# Patient Record
Sex: MALE | Race: WHITE | NOT HISPANIC OR LATINO | Employment: FULL TIME | URBAN - METROPOLITAN AREA
[De-identification: names, ages, dates, MRNs, and addresses within clinical notes are randomized per-mention and may not be internally consistent; named-entity substitution may affect disease eponyms.]

---

## 2017-04-13 ENCOUNTER — TELEPHONE (OUTPATIENT)
Dept: FAMILY MEDICINE | Facility: CLINIC | Age: 52
End: 2017-04-13

## 2017-04-13 NOTE — TELEPHONE ENCOUNTER
"Trung Dubois is a 51 year old male who calls with chest pain.    NURSING ASSESSMENT:  Description:  Has had 2-3 episodes over the past couple days. Chest pain; \"feels like someone is squeezing my chest really hard\". Pain in the lower jaw. Sob present with other episodes, not with current situation. Past couple days \"i have had to concentrate extremely hard in order for my hand to write. i normally have smooth, flowing handwriting, and now i can't even get my hand to move the pen\" patient states he thinks it's his acid reflux because he has been off his medication for a while now.   Onset/duration:  Couple days  Precip. factors:  n/a  Associated symptoms:  See above  Improves/worsens symptoms:  n/a  Pain scale (0-10)   7/10  LMP/preg/breast feeding:  n/a  Last exam/Treatment:  12/2016  Allergies:   Allergies   Allergen Reactions     Prevacid [Lansoprazole] Nausea       MEDICATIONS:   Taking medication(s) as prescribed? N/A  Taking over the counter medication(s?) N/A  Any medication side effects? Not Applicable    Any barriers to taking medication(s) as prescribed?  N/A  Medication(s) improving/managing symptoms?  N/A  Medication reconciliation completed: N/A      NURSING PLAN: Nursing advice to patient ED visit    RECOMMENDED DISPOSITION:  To ED, another person to drive - patient notified needs ED evaluation. Patient states he does not want to go, just wants to see Sangeetha in clinic. Notified patient that Sangeetha has no openings and is done for the day early and is not in tomorrow; notified we cannot get results fast in clinic as the tests are sent out to another location-if shows up to clinic with these symptoms, may be sent out by ambulance based on findings. Patient states he will go to the ED.   Will comply with recommendation: Yes  If further questions/concerns or if symptoms do not improve, worsen or new symptoms develop, call your PCP or [facility] as soon as possible.      Guideline " used:  Telephone Triage Protocols for Nurses, Fourth Edition, Sonja Jiménez  Chest pain  Breathing problems  Neurological symptoms    NOTES:  Disposition was determined by the first positive assessment question, therefore all previous assessment questions were negative      Sangeetha Frank RN

## 2017-04-18 ENCOUNTER — TELEPHONE (OUTPATIENT)
Dept: FAMILY MEDICINE | Facility: CLINIC | Age: 52
End: 2017-04-18

## 2017-04-18 NOTE — TELEPHONE ENCOUNTER
Reason for Call:  Other Stress Test    Detailed comments: pt calling states needs to have a stress test done and that an order needs to be placed for this regarding his chest pains. Please advise and contact pt in regards.    Phone Number Patient can be reached at: Cell number on file:    Telephone Information:   Mobile [phone]       Best Time: ANY    Can we leave a detailed message on this number? YES    Call taken on 4/18/2017 at 2:16 PM by Susu Mancuso
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
## 2017-04-20 ENCOUNTER — OFFICE VISIT (OUTPATIENT)
Dept: FAMILY MEDICINE | Facility: CLINIC | Age: 52
End: 2017-04-20
Payer: COMMERCIAL

## 2017-04-20 VITALS
BODY MASS INDEX: 36.5 KG/M2 | DIASTOLIC BLOOD PRESSURE: 87 MMHG | HEART RATE: 76 BPM | WEIGHT: 275.4 LBS | OXYGEN SATURATION: 97 % | HEIGHT: 73 IN | SYSTOLIC BLOOD PRESSURE: 129 MMHG | RESPIRATION RATE: 16 BRPM | TEMPERATURE: 98 F

## 2017-04-20 DIAGNOSIS — R07.89 ATYPICAL CHEST PAIN: ICD-10-CM

## 2017-04-20 DIAGNOSIS — K21.9 GASTROESOPHAGEAL REFLUX DISEASE, ESOPHAGITIS PRESENCE NOT SPECIFIED: Primary | ICD-10-CM

## 2017-04-20 DIAGNOSIS — R06.09 DOE (DYSPNEA ON EXERTION): ICD-10-CM

## 2017-04-20 PROCEDURE — 99214 OFFICE O/P EST MOD 30 MIN: CPT | Performed by: NURSE PRACTITIONER

## 2017-04-20 RX ORDER — MAGNESIUM HYDROXIDE/ALUMINUM HYDROXICE/SIMETHICONE 120; 1200; 1200 MG/30ML; MG/30ML; MG/30ML
30 SUSPENSION ORAL EVERY 4 HOURS PRN
COMMUNITY
End: 2017-04-26 | Stop reason: ALTCHOICE

## 2017-04-20 RX ORDER — PANTOPRAZOLE SODIUM 20 MG/1
TABLET, DELAYED RELEASE ORAL
Qty: 60 TABLET | Refills: 0 | Status: SHIPPED | OUTPATIENT
Start: 2017-04-20 | End: 2017-06-13

## 2017-04-20 RX ORDER — CALCIUM CARBONATE 500 MG/1
1 TABLET, CHEWABLE ORAL DAILY
COMMUNITY
End: 2017-04-26

## 2017-04-20 ASSESSMENT — PAIN SCALES - GENERAL: PAINLEVEL: NO PAIN (0)

## 2017-04-20 NOTE — MR AVS SNAPSHOT
After Visit Summary   4/20/2017    Trung Dubois    MRN: [id number]           Patient Information     Date Of Birth          1965        Visit Information        Provider Department      4/20/2017 3:20 PM Sangeetha Salazar APRN [facility]        Today's Diagnoses     Gastroesophageal reflux disease, esophagitis presence not specified    -  1    Atypical chest pain        AHUJA (dyspnea on exertion)           Follow-ups after your visit        Your next 10 appointments already scheduled     Apr 21, 2017  2:30 PM CDT   Ech Stress Test with MGECHR1, MG CARD, MG STRESS RM, MG ECHO TECH, MG CV TECH   [facility]    [id number] [place] [id number] [phone]           1.  Please bring or wear a comfortable two-piece outfit and walking shoes. 2.  Stop eating 3 hours before the test. You may drink water or juice. 3.  Stop all caffeine 12 hours before the test. This includes coffee, tea, soda pop, chocolate and certain medicines (such as Anacin and Excederin). Also avoid decaf coffee and tea, as these contain small amounts of caffeine. 4.  No alcohol, smoking or use of other tobacco products for 12 hours before the test. 5.  Refer to your provider instructions to see if you need to stop any medications (such as beta-blockers or nitrates) for this test. 6.  For patients with diabetes: -   If you take insulin, call your diabetes care team. Ask if you should take a   dose the morning of your test. -   If you take diabetes medicine by mouth, don't take it on the morning of your test. Bring it with you to take after the test.  (If you have questions, call your diabetes care team) 7.  When you arrive, please tell us if: -   You have diabetes. -   You have taken Viagra, Cialis or Levitra in the past 48 hours. 8.  For any questions that cannot be answered, please contact the ordering physician              Future tests that were  "ordered for you today     Open Future Orders        Priority Expected Expires Ordered    Echo stress test with definity Routine  4/20/2018 4/20/2017            Who to contact     If you have questions or need follow up information about today's clinic visit or your schedule please contact [facility] directly at [phone].  Normal or non-critical lab and imaging results will be communicated to you by MyChart, letter or phone within 4 business days after the clinic has received the results. If you do not hear from us within 7 days, please contact the clinic through [employer] or phone. If you have a critical or abnormal lab result, we will notify you by phone as soon as possible.  Submit refill requests through [employer] or call your pharmacy and they will forward the refill request to us. Please allow 3 business days for your refill to be completed.          Additional Information About Your Visit        [employer] Information     [employer] gives you secure access to your electronic health record. If you see a primary care provider, you can also send messages to your care team and make appointments. If you have questions, please call your primary care clinic.  If you do not have a primary care provider, please call [phone] and they will assist you.        Care EveryWhere ID     This is your Care EveryWhere ID. This could be used by other organizations to access your [facility] medical records  [id number]        Your Vitals Were     Pulse Temperature Respirations Height Pulse Oximetry BMI (Body Mass Index)    76 98  F (36.7  C) (Oral) 16 6' 1\" (1.854 m) 97% 36.33 kg/m2       Blood Pressure from Last 3 Encounters:   04/20/17 129/87   12/22/16 122/70   11/18/16 112/76    Weight from Last 3 Encounters:   04/20/17 275 lb 6.4 oz (124.9 kg)   12/22/16 270 lb (122.5 kg)   11/18/16 267 lb 1.6 oz (121.2 kg)                 Today's Medication Changes          These changes are accurate as of: 4/20/17 11:59 PM.  If " you have any questions, ask your nurse or doctor.               Start taking these medicines.        Dose/Directions    pantoprazole 20 MG EC tablet   Commonly known as:  PROTONIX   Used for:  Gastroesophageal reflux disease, esophagitis presence not specified   Started by:  Sangeetha Salazar APRN CNP        Take by mouth 30-60 minutes before a meal.   Quantity:  60 tablet   Refills:  0            Where to get your medicines      These medications were sent to [place] [address] [facility] - [place], [place] - [address]  [address], [facility] [id number]     Phone:  [phone]     pantoprazole 20 MG EC tablet                Primary Care Provider Office Phone # Fax #    DAWOOD Altamirano [phone]       [facility] [address] [facility] [address]        Thank you!     Thank you for choosing [facility]  for your care. Our goal is always to provide you with excellent care. Hearing back from our patients is one way we can continue to improve our services. Please take a few minutes to complete the written survey that you may receive in the mail after your visit with us. Thank you!             Your Updated Medication List - Protect others around you: Learn how to safely use, store and throw away your medicines at www.disposemymeds.org.          This list is accurate as of: 4/20/17 11:59 PM.  Always use your most recent med list.                   Brand Name Dispense Instructions for use    alum & mag hydroxide-simethicone 200-200-20 MG/5ML Susp suspension    MYLANTA/MAALOX     Take 30 mLs by mouth every 4 hours as needed for indigestion       calcium carbonate 500 MG chewable tablet    TUMS     Take 1 chew tab by mouth daily       DAILY MULTIVITAMIN PO      Take 1 tablet by mouth daily.       pantoprazole 20 MG EC tablet    PROTONIX    60 tablet    Take by mouth 30-60 minutes before a meal.

## 2017-04-20 NOTE — NURSING NOTE
"Chief Complaint   Patient presents with     Eval for Stress Test     has been having chest pain; pain in left arm and jaw 2 days ago     Panel Management     Colon Cancer, ENID, PHQ       Initial BP (!) 143/91 (BP Location: Left arm, Patient Position: Chair, Cuff Size: Adult Large)  Pulse 76  Temp 98  F (36.7  C) (Oral)  Resp 16  Ht 6' 1\" (1.854 m)  Wt 275 lb 6.4 oz (124.9 kg)  BMI 36.33 kg/m2 Estimated body mass index is 36.33 kg/(m^2) as calculated from the following:    Height as of this encounter: 6' 1\" (1.854 m).    Weight as of this encounter: 275 lb 6.4 oz (124.9 kg).  Medication Reconciliation: complete  Tasia Damian CMA (AAMA)    "

## 2017-04-21 ENCOUNTER — RADIANT APPOINTMENT (OUTPATIENT)
Dept: CARDIOLOGY | Facility: CLINIC | Age: 52
End: 2017-04-21
Attending: NURSE PRACTITIONER
Payer: COMMERCIAL

## 2017-04-21 VITALS — DIASTOLIC BLOOD PRESSURE: 95 MMHG | SYSTOLIC BLOOD PRESSURE: 134 MMHG | HEART RATE: 67 BPM

## 2017-04-21 DIAGNOSIS — R06.09 DOE (DYSPNEA ON EXERTION): ICD-10-CM

## 2017-04-21 DIAGNOSIS — R07.89 ATYPICAL CHEST PAIN: ICD-10-CM

## 2017-04-21 PROCEDURE — 93321 DOPPLER ECHO F-UP/LMTD STD: CPT | Mod: TC | Performed by: INTERNAL MEDICINE

## 2017-04-21 PROCEDURE — 93017 CV STRESS TEST TRACING ONLY: CPT | Performed by: INTERNAL MEDICINE

## 2017-04-21 PROCEDURE — 93321 DOPPLER ECHO F-UP/LMTD STD: CPT | Mod: 26 | Performed by: INTERNAL MEDICINE

## 2017-04-21 PROCEDURE — 93350 STRESS TTE ONLY: CPT | Mod: TC | Performed by: INTERNAL MEDICINE

## 2017-04-21 PROCEDURE — 93016 CV STRESS TEST SUPVJ ONLY: CPT | Performed by: INTERNAL MEDICINE

## 2017-04-21 PROCEDURE — 93018 CV STRESS TEST I&R ONLY: CPT | Performed by: INTERNAL MEDICINE

## 2017-04-21 PROCEDURE — 93350 STRESS TTE ONLY: CPT | Mod: 26 | Performed by: INTERNAL MEDICINE

## 2017-04-21 PROCEDURE — 93325 DOPPLER ECHO COLOR FLOW MAPG: CPT | Mod: 26 | Performed by: INTERNAL MEDICINE

## 2017-04-21 PROCEDURE — 93352 ADMIN ECG CONTRAST AGENT: CPT | Performed by: INTERNAL MEDICINE

## 2017-04-21 PROCEDURE — 93325 DOPPLER ECHO COLOR FLOW MAPG: CPT | Mod: TC | Performed by: INTERNAL MEDICINE

## 2017-04-21 RX ADMIN — Medication 10 ML: at 15:19

## 2017-04-21 NOTE — PROGRESS NOTES
Patient presents today for stress echo ordered by MD. Prior to patient visit, chart prep by CMA done including confirmation of order, medications reviewed for contraindications, reviewed previous EKG's for trends & concerns and reviewed patient's medical history.    IV started in R medial with a 22G Jeclo Catheter.     Echo technician completed resting portion of echo.    Stress portion of echo completed utilizing bike and pictures taken at [facility].  Blood pressure taken every 2 minutes and documented in Muse system.    Difinity medication used 7cc, wasted 3cc Definity NDC # 90930-320-05 (1.5ml Definity mixed with 8.5ml Saline )  Atropine medication given  NONE Atropine NDC# 40386-761-43     Patient offered complaints of: Fatigue and reflux    After completion of stress echo, recovery period with blood pressure monitoring occurs at 1, 3 and 5 minutes and documented in Muse.  IV removed and water provided to patient.    Patient education provided about cardiology interpretation and primary provider will be notified of results.    Dr. Torres provided supervision of the tests performed today.    Astrid Montilla, CCT, Cardiac CMA

## 2017-04-21 NOTE — PROGRESS NOTES
Per Dr. Salamanca, pt should be seen 1st available.  This writer offered patient 3 appointment times, however, patient refused and wanted Wednesday, April 26th at 3:30pm at [facility] w/Dr. BOSWELL.  This writer also advised to notify Pt to restrict activity and present to ED w/chest pain.

## 2017-04-24 ENCOUNTER — PRE VISIT (OUTPATIENT)
Dept: CARDIOLOGY | Facility: CLINIC | Age: 52
End: 2017-04-24
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
## 2017-04-25 ENCOUNTER — TELEPHONE (OUTPATIENT)
Dept: FAMILY MEDICINE | Facility: CLINIC | Age: 52
End: 2017-04-25

## 2017-04-25 DIAGNOSIS — F33.1 MAJOR DEPRESSIVE DISORDER, RECURRENT EPISODE, MODERATE (H): Primary | ICD-10-CM

## 2017-04-25 RX ORDER — BUPROPION HYDROCHLORIDE 150 MG/1
150 TABLET ORAL EVERY MORNING
Qty: 30 TABLET | Refills: 1 | Status: SHIPPED | OUTPATIENT
Start: 2017-04-25 | End: 2017-08-30

## 2017-04-25 NOTE — TELEPHONE ENCOUNTER
[facility] phone call message- patient requests medication or medication refill:    If this is a refill request, has the caller requested the refill from the pharmacy already? No  Name of the pharmacy and phone number for the current request:  [facility]      Name of the medication requested: something for depression/ anx med     Other request: Pt states he was on something before but it was discontinued. He saw his therapist today and she suggested he go back on it. She cannot prescribe it so she told him to call you and see if you can. He did not know that name. Please call him after 3:15 PM today if needed. Declined to schedule an appt as he states he was just in last week.    OK to leave the result message on voice mail or with a family member? YES    Call taken on 4/25/2017 at 1:47 PM by Ivy Hammer

## 2017-04-25 NOTE — TELEPHONE ENCOUNTER
Patient informed. 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  RN, please call patient to discuss possible side effects.

## 2017-04-25 NOTE — TELEPHONE ENCOUNTER
Discussed with patient. States he is ok with taking still. Will  and f/u per recommendations.    Sangeetha Frank, RN, BSN

## 2017-04-26 ENCOUNTER — OFFICE VISIT (OUTPATIENT)
Dept: CARDIOLOGY | Facility: CLINIC | Age: 52
End: 2017-04-26
Payer: COMMERCIAL

## 2017-04-26 VITALS
OXYGEN SATURATION: 98 % | SYSTOLIC BLOOD PRESSURE: 142 MMHG | WEIGHT: 270 LBS | TEMPERATURE: 69 F | BODY MASS INDEX: 35.62 KG/M2 | DIASTOLIC BLOOD PRESSURE: 95 MMHG

## 2017-04-26 DIAGNOSIS — R07.89 ATYPICAL CHEST PAIN: Primary | ICD-10-CM

## 2017-04-26 PROCEDURE — 99214 OFFICE O/P EST MOD 30 MIN: CPT | Performed by: INTERNAL MEDICINE

## 2017-04-26 ASSESSMENT — PAIN SCALES - GENERAL: PAINLEVEL: NO PAIN (0)

## 2017-04-26 NOTE — NURSING NOTE
"Chief Complaint   Patient presents with     Heart Problem     Per Dr. Salamanca Positive stress. Basal inferior, basal inferoseptal hypo- to akinesis at rest. The wall motion abnormalities improve with exercise. Pt c/o  \"muscle pain\" that will radiate. No sob, no dizziness, no fluttering.  Fatigue all the time not abnormal.       Initial BP (!) 142/95 (BP Location: Right arm, Patient Position: Chair, Cuff Size: Adult Large)  Temp (!) 69  F (20.6  C)  Wt 122.5 kg (270 lb)  SpO2 98%  BMI 35.62 kg/m2 Estimated body mass index is 35.62 kg/(m^2) as calculated from the following:    Height as of 4/20/17: 1.854 m (6' 1\").    Weight as of this encounter: 122.5 kg (270 lb)..  BP completed using cuff size: large    Maty Dale CMA  "

## 2017-04-26 NOTE — NURSING NOTE
Med Reconcile: Reviewed and verified all current medications with the patient. The updated medication list was printed and given to the patient.    Return Appointment: Patient given instructions regarding scheduling next clinic visit, PRN. Patient demonstrated understanding of this information and agreed to call with further questions or concerns.    Patient stated he understood all health information given and agreed to call with further questions or concerns.    Rashaad Quiroz RN

## 2017-04-26 NOTE — MR AVS SNAPSHOT
After Visit Summary   4/26/2017    Trung Dubois    MRN: [id number]           Patient Information     Date Of Birth          1965        Visit Information        Provider Department      4/26/2017 3:30 PM SHANIQUA Rivero MD [facility] HEART [facility]        Care Instructions    1.  Dr. ANDREIA Franco does not want to make any changes today. Please call us if you have any new symptoms.        [facility] Cardiology [facility] Location    If you have any questions regarding to your visit please contact your care team:     Cardiology  Telephone Number   Rashaad Oconnell  Cardiology RN's.    Vicki Dale [facility] [phone]    After hours: [phone].  [phone]   For scheduling appts:     [phone] or  [phone]    After hours: [phone]   For the Device Clinic (Pacemakers and ICD's)  RN's :  Sonja Chi   During business hours: [phone]  After business hours:  [phone]- select option 4.      If you need a medication refill please contact your pharmacy.  Please allow 3 business days for your refill to be completed.    As always, Thank you for trusting us with your health care needs!  _____________________________________________________________________            Follow-ups after your visit        Who to contact     If you have questions or need follow up information about today's clinic visit or your schedule please contact [facility] PHYSICIANS HEART AT [facility] [facility] directly at [phone].  Normal or non-critical lab and imaging results will be communicated to you by MyChart, letter or phone within 4 business days after the clinic has received the results. If you do not hear from us within 7 days, please contact the clinic through MyChart or phone. If you have a critical or abnormal lab result, we will notify you by phone as soon as possible.  Submit refill requests through MyChart or call your pharmacy and they  will forward the refill request to us. Please allow 3 business days for your refill to be completed.          Additional Information About Your Visit        [employer] Information     [employer] gives you secure access to your electronic health record. If you see a primary care provider, you can also send messages to your care team and make appointments. If you have questions, please call your primary care clinic.  If you do not have a primary care provider, please call [phone] and they will assist you.        Care EveryWhere ID     This is your Care EveryWhere ID. This could be used by other organizations to access your [facility] medical records  [id number]        Your Vitals Were     Temperature Pulse Oximetry BMI (Body Mass Index)             69  F (20.6  C) 98% 35.62 kg/m2          Blood Pressure from Last 3 Encounters:   04/26/17 (!) 142/95   04/21/17 (!) 134/95   04/20/17 129/87    Weight from Last 3 Encounters:   04/26/17 122.5 kg (270 lb)   04/20/17 124.9 kg (275 lb 6.4 oz)   12/22/16 122.5 kg (270 lb)              Today, you had the following     No orders found for display         Today's Medication Changes          These changes are accurate as of: 4/26/17  3:47 PM.  If you have any questions, ask your nurse or doctor.               Stop taking these medicines if you haven't already. Please contact your care team if you have questions.     alum & mag hydroxide-simethicone 200-200-20 MG/5ML Susp suspension   Commonly known as:  MYLANTA/MAALOX   Stopped by:  SHANIQUA Rivero MD                    Primary Care Provider Office Phone # Fax #    DAWOOD [facility] [phone]       [facility] [address] [facility] [address]        Thank you!     Thank you for choosing [facility] PHYSICIANS HEART AT [facility]  for your care. Our goal is always to provide you with excellent care. Hearing back from our patients is one way we can continue to improve our  services. Please take a few minutes to complete the written survey that you may receive in the mail after your visit with us. Thank you!             Your Updated Medication List - Protect others around you: Learn how to safely use, store and throw away your medicines at www.disposemymeds.org.          This list is accurate as of: 4/26/17  3:47 PM.  Always use your most recent med list.                   Brand Name Dispense Instructions for use    buPROPion 150 MG 24 hr tablet    WELLBUTRIN XL    30 tablet    Take 1 tablet (150 mg) by mouth every morning       DAILY MULTIVITAMIN PO      Take 1 tablet by mouth daily.       pantoprazole 20 MG EC tablet    PROTONIX    60 tablet    Take by mouth 30-60 minutes before a meal.
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
## 2017-04-26 NOTE — PATIENT INSTRUCTIONS
1.  Dr. ANDREIA Franco does not want to make any changes today. Please call us if you have any new symptoms.        [facility] [place] Location    If you have any questions regarding to your visit please contact your care team:     Cardiology  Telephone Number   Rashaad Oconnell  Cardiology RN's.    Vicki Dale CMA [phone]    After hours: [phone].  [phone]   For scheduling appts:     [phone] or  [phone]    After hours: [phone]   For the Device Clinic (Pacemakers and ICD's)  RN's :  Sonja Chi   During business hours: [phone]  After business hours:  [phone]- select option 4.      If you need a medication refill please contact your pharmacy.  Please allow 3 business days for your refill to be completed.    As always, Thank you for trusting us with your health care needs!  _____________________________________________________________________

## 2017-05-17 ENCOUNTER — TELEPHONE (OUTPATIENT)
Dept: FAMILY MEDICINE | Facility: CLINIC | Age: 52
End: 2017-05-17

## 2017-05-17 DIAGNOSIS — F39 MOOD DISORDER (H): Primary | ICD-10-CM

## 2017-05-17 NOTE — TELEPHONE ENCOUNTER
Patient states he sees a psychologist and does not want to see a psychiatrist. Patient states he was told by his psychologist to stop the wellbutrin and start Celexa instead. Patient states he is under a lot of stress and suicide runs in his family. Does not currently have thoughts or plan, but is worried it may come to this. Does not want to change providers, prefers Sangeetha Salazar to prescribe this. Patient aware KL out of office today. Notified if having symptoms, needs to be seen in ED. Denies current concerns, just that he has a lot of stress and would like to change the medications so it is more beneficial for him.     Provider please advise.    Sangeetha Frank, RN, BSN

## 2017-05-17 NOTE — TELEPHONE ENCOUNTER
Call pt. He may stop meds, and needs to see psychiatry for medication.  Triage if OV is needed, otherwise he may call and schedule this. Referral placed, give info to pt. . Sangeetha Salazar

## 2017-05-17 NOTE — TELEPHONE ENCOUNTER
Trung's  psychologist calls,  Celena Newsome.  Is asking for colt to call her tomorrow when she returns to office, [phone].

## 2017-05-17 NOTE — TELEPHONE ENCOUNTER
Pt calling. He would like to speak to Sangeetha Salazar. He saw his psych provider and was told that his medications need to be changed. He states you know what drugs they are and he declined to make an appt. He states it is very important that he gets a call back ASAP. Please give him 20 mins from time of call and then he will be available for an hour.  Thank you,  Ivy Hammer- Pt Rep.

## 2017-05-17 NOTE — TELEPHONE ENCOUNTER
Patient returning call.  No nurse available to take call at this time.  Please try him again.  He is available the next 15 minutes.  Thank you

## 2017-05-18 NOTE — TELEPHONE ENCOUNTER
Patient notified he can schedule with Mayra Roberson (patient used to see at [facility] prior to her leaving) at [facility] in [place]. Patient declines this option. Notified he could schedule a phone or office visit to discuss other options with KL then if needed. Patient scheduled for phone visit on 5/19/17.     Sangeetha Frank RN, BSN

## 2017-05-18 NOTE — TELEPHONE ENCOUNTER
"Celena Mercedez was called back.   Having passive SI. No plan. Has safety plan in place with therapist. She is advising follow-up with me to him. No information was given/ will need C2C, but she was worried about emergency consultation with his verbal statement of \" I'm not sure I should live, what's the point.\"  Sangeetha Salazar   "

## 2017-05-19 ENCOUNTER — VIRTUAL VISIT (OUTPATIENT)
Dept: FAMILY MEDICINE | Facility: CLINIC | Age: 52
End: 2017-05-19
Payer: COMMERCIAL

## 2017-05-19 DIAGNOSIS — R45.851 SUICIDAL IDEATION: ICD-10-CM

## 2017-05-19 DIAGNOSIS — F41.1 GENERALIZED ANXIETY DISORDER: ICD-10-CM

## 2017-05-19 DIAGNOSIS — F39 MOOD DISORDER (H): ICD-10-CM

## 2017-05-19 DIAGNOSIS — F33.9 RECURRENT MAJOR DEPRESSIVE DISORDER, REMISSION STATUS UNSPECIFIED (H): Primary | ICD-10-CM

## 2017-05-19 PROCEDURE — 98966 PH1 ASSMT&MGMT NQHP 5-10: CPT | Performed by: NURSE PRACTITIONER

## 2017-05-19 RX ORDER — CITALOPRAM HYDROBROMIDE 20 MG/1
20 TABLET ORAL DAILY
Qty: 30 TABLET | Refills: 1 | Status: SHIPPED | OUTPATIENT
Start: 2017-05-19 | End: 2017-08-30

## 2017-05-19 RX ORDER — BUPROPION HYDROCHLORIDE 300 MG/1
300 TABLET ORAL EVERY MORNING
Qty: 30 TABLET | Refills: 1 | Status: SHIPPED | OUTPATIENT
Start: 2017-05-19 | End: 2017-07-16

## 2017-05-19 ASSESSMENT — ANXIETY QUESTIONNAIRES
IF YOU CHECKED OFF ANY PROBLEMS ON THIS QUESTIONNAIRE, HOW DIFFICULT HAVE THESE PROBLEMS MADE IT FOR YOU TO DO YOUR WORK, TAKE CARE OF THINGS AT HOME, OR GET ALONG WITH OTHER PEOPLE: SOMEWHAT DIFFICULT
3. WORRYING TOO MUCH ABOUT DIFFERENT THINGS: MORE THAN HALF THE DAYS
7. FEELING AFRAID AS IF SOMETHING AWFUL MIGHT HAPPEN: NEARLY EVERY DAY
GAD7 TOTAL SCORE: 12
6. BECOMING EASILY ANNOYED OR IRRITABLE: SEVERAL DAYS
5. BEING SO RESTLESS THAT IT IS HARD TO SIT STILL: NOT AT ALL
2. NOT BEING ABLE TO STOP OR CONTROL WORRYING: NEARLY EVERY DAY
1. FEELING NERVOUS, ANXIOUS, OR ON EDGE: MORE THAN HALF THE DAYS

## 2017-05-19 ASSESSMENT — PATIENT HEALTH QUESTIONNAIRE - PHQ9: 5. POOR APPETITE OR OVEREATING: SEVERAL DAYS

## 2017-05-19 NOTE — PROGRESS NOTES
"Trung Dubois is a 52 year old male who is being evaluated via a telephone visit.      The patient has been notified of following:     \"This telephone visit will be conducted via a call between you and your physician/provider. We have found that certain health care needs can be provided without the need for a physical exam.  This service lets us provide the care you need with a short phone conversation.  If a prescription is necessary we can send it directly to your pharmacy.  If lab work is needed we can place an order for that and you can then stop by our lab to have the test done at a later time.    We will bill your insurance company for this service.  Please check with your medical insurance if this type of visit is covered. You may be responsible for the cost of this type of visit if insurance coverage is denied.  The typical cost is $30 (10min), $59 (11-20min) and $85 (21-30min).  Most often these visits are shorter than 10 minutes.    If during the course of the call the physician/provider feels a telephone visit is not appropriate, you will not be charged for this service.\"       Consent has been obtained for this service by 2 care team members: yes. See the scanned image in the medical record.    Trung Dubois complains of  Depression      I have reviewed and updated the patient's Past Medical History, Social History, Family History and Medication List.    ALLERGIES  Prevacid [lansoprazole]    Sharon Sanders CMA ([facility])   (MA signature)    Additional provider notes: Passive SI. Wife threatening divorce. Son is not talking with him- no control over him. His son states his father is \"too much\" for him. Pt. Open to counseling. Divorce is not imminent per pt.   In counseling.   Felt no difference on Wellbutrin.   Counselor suggested adding Celexa.   Re-check in 2 weeks.   Return to clinic with any new or worsening symptoms, and as needed.     Assessment/Plan:     Recurrent major depressive disorder, " remission status unspecified (H)  Suicidal ideation  Generalized anxiety disorder  Mood disorder (H)     I have reviewed the note as documented above.  This accurately captures the substance of my conversation with the patient,  Safety plan reiterated. Pt. Asked to notify counselor that meds are changed. He is going to sign JENNIFER.     Total time of call between patient and provider was 6 minutes.

## 2017-05-19 NOTE — PROGRESS NOTES
Pt stated that his therapist told him that he needed to speak with primary care provider prior to starting concerta. PHQ9/ENID questionnaires done. Pt would like a call at 930am or 10am-12pm if possible.    Sharon Sanders CMA ([facility])

## 2017-05-19 NOTE — MR AVS SNAPSHOT
After Visit Summary   5/19/2017    Trung Dubois    MRN: [id number]           Patient Information     Date Of Birth          1965        Visit Information        Provider Department      5/19/2017 12:20 PM Sangeetha Saalzar APRN CNP [facility]        Today's Diagnoses     Recurrent major depressive disorder, remission status unspecified (H)    -  1    Suicidal ideation        Generalized anxiety disorder        Mood disorder (H)           Follow-ups after your visit        Who to contact     If you have questions or need follow up information about today's clinic visit or your schedule please contact [facility] directly at [phone].  Normal or non-critical lab and imaging results will be communicated to you by Longaccess[employer], letter or phone within 4 business days after the clinic has received the results. If you do not hear from us within 7 days, please contact the clinic through Longaccesshart or phone. If you have a critical or abnormal lab result, we will notify you by phone as soon as possible.  Submit refill requests through [employer] or call your pharmacy and they will forward the refill request to us. Please allow 3 business days for your refill to be completed.          Additional Information About Your Visit        MyChart Information     [employer] gives you secure access to your electronic health record. If you see a primary care provider, you can also send messages to your care team and make appointments. If you have questions, please call your primary care clinic.  If you do not have a primary care provider, please call [phone] and they will assist you.        Care EveryWhere ID     This is your Care EveryWhere ID. This could be used by other organizations to access your [facility] medical records  [id number]         Blood Pressure from Last 3 Encounters:   04/26/17 (!) 142/95   04/21/17 (!) 134/95   04/20/17 129/87    Weight from Last 3 Encounters:   04/26/17 270 lb  (122.5 kg)   04/20/17 275 lb 6.4 oz (124.9 kg)   12/22/16 270 lb (122.5 kg)              Today, you had the following     No orders found for display         Today's Medication Changes          These changes are accurate as of: 5/19/17 11:59 PM.  If you have any questions, ask your nurse or doctor.               Start taking these medicines.        Dose/Directions    citalopram 20 MG tablet   Commonly known as:  celeXA   Used for:  Recurrent major depressive disorder, remission status unspecified (H), Suicidal ideation, Generalized anxiety disorder, Mood disorder (H)        Dose:  20 mg   Take 1 tablet (20 mg) by mouth daily   Quantity:  30 tablet   Refills:  1         These medicines have changed or have updated prescriptions.        Dose/Directions    * buPROPion 150 MG 24 hr tablet   Commonly known as:  WELLBUTRIN XL   This may have changed:  Another medication with the same name was added. Make sure you understand how and when to take each.   Used for:  Major depressive disorder, recurrent episode, moderate (H)        Dose:  150 mg   Take 1 tablet (150 mg) by mouth every morning   Quantity:  30 tablet   Refills:  1       * buPROPion 300 MG 24 hr tablet   Commonly known as:  WELLBUTRIN XL   This may have changed:  You were already taking a medication with the same name, and this prescription was added. Make sure you understand how and when to take each.   Used for:  Recurrent major depressive disorder, remission status unspecified (H), Suicidal ideation, Generalized anxiety disorder, Mood disorder (H)        Dose:  300 mg   Take 1 tablet (300 mg) by mouth every morning   Quantity:  30 tablet   Refills:  1       * Notice:  This list has 2 medication(s) that are the same as other medications prescribed for you. Read the directions carefully, and ask your doctor or other care provider to review them with you.         Where to get your medicines      These medications were sent to [place] [address] [place] [facility]  [address] [address] [place] [address]  [address] [address] [facility] [address]     Phone:  [phone]     buPROPion 300 MG 24 hr tablet    citalopram 20 MG tablet                Primary Care Provider Office Phone # Fax #    Sangeetha SalazarDAWOOD [phone]       [facility] [address] [facility]  [place] [address]        Thank you!     Thank you for choosing [facility]  for your care. Our goal is always to provide you with excellent care. Hearing back from our patients is one way we can continue to improve our services. Please take a few minutes to complete the written survey that you may receive in the mail after your visit with us. Thank you!             Your Updated Medication List - Protect others around you: Learn how to safely use, store and throw away your medicines at www.disposemymeds.org.          This list is accurate as of: 5/19/17 11:59 PM.  Always use your most recent med list.                   Brand Name Dispense Instructions for use    * buPROPion 150 MG 24 hr tablet    WELLBUTRIN XL    30 tablet    Take 1 tablet (150 mg) by mouth every morning       * buPROPion 300 MG 24 hr tablet    WELLBUTRIN XL    30 tablet    Take 1 tablet (300 mg) by mouth every morning       citalopram 20 MG tablet    celeXA    30 tablet    Take 1 tablet (20 mg) by mouth daily       DAILY MULTIVITAMIN PO      Take 1 tablet by mouth daily.       pantoprazole 20 MG EC tablet    PROTONIX    60 tablet    Take by mouth 30-60 minutes before a meal.       * Notice:  This list has 2 medication(s) that are the same as other medications prescribed for you. Read the directions carefully, and ask your doctor or other care provider to review them with you.

## 2017-05-20 ASSESSMENT — ANXIETY QUESTIONNAIRES: GAD7 TOTAL SCORE: 12

## 2017-05-25 ENCOUNTER — TELEPHONE (OUTPATIENT)
Dept: FAMILY MEDICINE | Facility: CLINIC | Age: 52
End: 2017-05-25

## 2017-05-25 NOTE — TELEPHONE ENCOUNTER
Left message on machine to call back. Please route to Sangeetha HERNANDEZ RN if calls back.     Per KL: both medications can be used for symptoms of bipolar. She is concerned that this is something he needs addressed. If medications are not working or is having other concerns, he needs to see psychiatry. Referral can be placed to see Mayra Roberson at [facility] or other provider of choice.     Sangeetha Frank RN, BSN

## 2017-05-25 NOTE — TELEPHONE ENCOUNTER
Bob is asking for a call back in regards to medications Celexa and Wellbutrin.  Has some concerns and is asking if one is to prevent bi - polar.

## 2017-05-25 NOTE — TELEPHONE ENCOUNTER
Patient states he wanted to know why he was prescribed these two medications, for bipolar or depression. Notified can help with both conditions. Patient notified to start and if had questions to call and discuss with AJ or an RN. Patient verbalized understanding.    Sangeetha Frank, SANNA, BSN

## 2017-06-13 DIAGNOSIS — K21.9 GASTROESOPHAGEAL REFLUX DISEASE, ESOPHAGITIS PRESENCE NOT SPECIFIED: ICD-10-CM

## 2017-06-13 NOTE — TELEPHONE ENCOUNTER
protonix      Last Written Prescription Date: 04/20/17  Last Fill Quantity: 60,  # refills: 0   Last Office Visit with G, P or [facility] prescribing provider: 05/19/17

## 2017-06-14 RX ORDER — PANTOPRAZOLE SODIUM 20 MG/1
TABLET, DELAYED RELEASE ORAL
Qty: 60 TABLET | Refills: 0 | Status: SHIPPED | OUTPATIENT
Start: 2017-06-14 | End: 2017-11-29

## 2017-06-14 NOTE — TELEPHONE ENCOUNTER
Routing refill request to provider for review/approval because:  OV 4/20 advises taking protonix for two months - unsure if it should be refilled.     Liseth Reyes, RN, BSN

## 2017-06-14 NOTE — TELEPHONE ENCOUNTER
Per Sangeetha Salazar, okay to refill for 2 more months, then pt needs to follow up in clinic before additional refills.  Notified patient. Aware and agrees to follow up for further refills.    Yumiko Betts, RN, BSN

## 2017-07-14 ENCOUNTER — TELEPHONE (OUTPATIENT)
Dept: FAMILY MEDICINE | Facility: CLINIC | Age: 52
End: 2017-07-14

## 2017-07-14 DIAGNOSIS — F39 MOOD DISORDER (H): ICD-10-CM

## 2017-07-14 DIAGNOSIS — F33.2 SEVERE EPISODE OF RECURRENT MAJOR DEPRESSIVE DISORDER, WITHOUT PSYCHOTIC FEATURES (H): ICD-10-CM

## 2017-07-14 DIAGNOSIS — F41.1 GENERALIZED ANXIETY DISORDER: Primary | ICD-10-CM

## 2017-07-14 NOTE — TELEPHONE ENCOUNTER
Please call and see if this can be postponed till Tuesday when Sangeetha returns to clinic. If acceptable, please note as PCP only.    Marcell Rinaldi MD

## 2017-07-14 NOTE — TELEPHONE ENCOUNTER
Reason for Call:  Other prescription    Detailed comments: Celena Newsome (Phycologist) from private practice is calling to clarify a couple prescriptions. Celena thinks patient is on the wrong medication.      Phone Number Patient can be reached at: [phone]    Best Time: Anytime     Can we leave a detailed message on this number? YES    Call taken on 7/14/2017 at 11:37 AM by Melisa Oliveros

## 2017-07-14 NOTE — TELEPHONE ENCOUNTER
Spoke with Celena.  She stated that she had recommended that the pt stop taking wellbutrin and continue with celexa 20 mg once a day.    Pt expressed concern to her that celexa was for people with bipolar disorder.  Celena said celexa can be used to treat pscych conditions other than bipolar as well.      Provider, please review and advise on medication updates.  Celena is ok to wait until Tuesday to hear back.

## 2017-07-15 NOTE — TELEPHONE ENCOUNTER
Call pt. Advise psychiatry. His medication and psychiatric issues are too complex and he needs to see psychiatry. He can see nina Roberson at [facility], or other place of his choosing. If he needs medication changes this should be the route. I will see him once more in clinic if needed in the meantime. Sangeetha Salazar

## 2017-07-16 DIAGNOSIS — F39 MOOD DISORDER (H): ICD-10-CM

## 2017-07-16 DIAGNOSIS — R45.851 SUICIDAL IDEATION: ICD-10-CM

## 2017-07-16 DIAGNOSIS — F41.1 GENERALIZED ANXIETY DISORDER: ICD-10-CM

## 2017-07-16 DIAGNOSIS — F33.9 RECURRENT MAJOR DEPRESSIVE DISORDER, REMISSION STATUS UNSPECIFIED (H): ICD-10-CM

## 2017-07-17 NOTE — TELEPHONE ENCOUNTER
Pt declines psychiatry. He is going to call back to schedule an appointment with Sangeetha Salazar DNP to discuss. Tasia Damian CMA ([facility])

## 2017-07-18 NOTE — TELEPHONE ENCOUNTER
buPROPion (WELLBUTRIN XL) 300 MG 24 hr tablet       Last Written Prescription Date: 5/19/17  Last Fill Quantity: 30; # refills: 1  Last Office Visit with [facility], [facility] or [facility] prescribing provider:  4/20/17        Last PHQ-9 score on record=   PHQ-9 SCORE 7/15/2016   Total Score -   Total Score 6       Lab Results   Component Value Date    AST 13 06/29/2015     Lab Results   Component Value Date    ALT 28 06/29/2015

## 2017-07-19 NOTE — TELEPHONE ENCOUNTER
Routing refill request to provider for review/approval because:  Patient needs to be seen because: overdue for recheck of mood    Liseth Reyes, RN, BSN

## 2017-07-20 RX ORDER — BUPROPION HYDROCHLORIDE 300 MG/1
TABLET ORAL
Qty: 30 TABLET | Refills: 0 | Status: SHIPPED | OUTPATIENT
Start: 2017-07-20 | End: 2017-10-26

## 2017-07-20 RX ORDER — BUPROPION HYDROCHLORIDE 300 MG/1
TABLET ORAL
Qty: 14 TABLET | Refills: 0 | Status: SHIPPED | OUTPATIENT
Start: 2017-07-20 | End: 2017-07-20

## 2017-07-20 NOTE — TELEPHONE ENCOUNTER
#30 given. Pt. Advised to f/u with psychiatry. Bethanie # given. Pt. Will check insurance. Sangeetha Salazar

## 2017-08-21 ENCOUNTER — TELEPHONE (OUTPATIENT)
Dept: FAMILY MEDICINE | Facility: CLINIC | Age: 52
End: 2017-08-21

## 2017-08-21 DIAGNOSIS — R06.83 SNORING: Primary | ICD-10-CM

## 2017-08-21 NOTE — TELEPHONE ENCOUNTER
Reason for call:  Patient would like to get a referral for a sleep apnea testing.  He would like the referral to be sent to his [employer]. 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  Ok to leave a message

## 2017-08-21 NOTE — TELEPHONE ENCOUNTER
Call pt. Referral done- it is sent electronically to [facility]. He can call in a couple days to see if they have it for him. Sangeetha Salazar

## 2017-08-30 ENCOUNTER — OFFICE VISIT (OUTPATIENT)
Dept: SLEEP MEDICINE | Facility: CLINIC | Age: 52
End: 2017-08-30
Payer: COMMERCIAL

## 2017-08-30 ENCOUNTER — OFFICE VISIT (OUTPATIENT)
Dept: SLEEP MEDICINE | Facility: CLINIC | Age: 52
End: 2017-08-30
Attending: NURSE PRACTITIONER
Payer: COMMERCIAL

## 2017-08-30 VITALS
BODY MASS INDEX: 37.84 KG/M2 | HEIGHT: 72 IN | HEART RATE: 69 BPM | SYSTOLIC BLOOD PRESSURE: 137 MMHG | WEIGHT: 279.4 LBS | OXYGEN SATURATION: 96 % | DIASTOLIC BLOOD PRESSURE: 88 MMHG

## 2017-08-30 DIAGNOSIS — E66.01 MORBID OBESITY DUE TO EXCESS CALORIES (H): ICD-10-CM

## 2017-08-30 DIAGNOSIS — R06.83 SNORING: ICD-10-CM

## 2017-08-30 DIAGNOSIS — E66.01 MORBID OBESITY DUE TO EXCESS CALORIES (H): Primary | ICD-10-CM

## 2017-08-30 DIAGNOSIS — G47.33 OSA (OBSTRUCTIVE SLEEP APNEA): ICD-10-CM

## 2017-08-30 PROCEDURE — G0399 HOME SLEEP TEST/TYPE 3 PORTA: HCPCS | Performed by: INTERNAL MEDICINE

## 2017-08-30 PROCEDURE — 99244 OFF/OP CNSLTJ NEW/EST MOD 40: CPT | Performed by: INTERNAL MEDICINE

## 2017-08-30 NOTE — PATIENT INSTRUCTIONS
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
## 2017-08-30 NOTE — NURSING NOTE
"Chief Complaint   Patient presents with     Sleep Problem     consult-DOT required       Initial There were no vitals taken for this visit. Estimated body mass index is 35.62 kg/(m^2) as calculated from the following:    Height as of 4/20/17: 1.854 m (6' 1\").    Weight as of 4/26/17: 122.5 kg (270 lb).  Medication Reconciliation: complete   Neck circumference: Neck Cir (cm): 46 cm (8/30/2017 12:58 PM)   inches.      "

## 2017-08-30 NOTE — PROGRESS NOTES
Patient picked up WatchPAT and was instructed on use. They showed understanding by demonstrating it back.

## 2017-08-30 NOTE — MR AVS SNAPSHOT
After Visit Summary   8/30/2017    Trung Dubois    MRN: [id number]           Patient Information     Date Of Birth          1965        Visit Information        Provider Department      8/30/2017 1:00 PM Souleymane Gordon MD [facility]        Today's Diagnoses     Morbid obesity due to excess calories (H)    -  1    Snoring          Care Instructions      Your BMI is Body mass index is 37.89 kg/(m^2).  Weight management is a personal decision.  If you are interested in exploring weight loss strategies, the following discussion covers the approaches that may be successful. Body mass index (BMI) is one way to tell whether you are at a healthy weight, overweight, or obese. It measures your weight in relation to your height.  A BMI of 18.5 to 24.9 is in the healthy range. A person with a BMI of 25 to 29.9 is considered overweight, and someone with a BMI of 30 or greater is considered obese. More than two-thirds of American adults are considered overweight or obese.  Being overweight or obese increases the risk for further weight gain. Excess weight may lead to heart disease and diabetes.  Creating and following plans for healthy eating and physical activity may help you improve your health.  Weight control is part of healthy lifestyle and includes exercise, emotional health, and healthy eating habits. Careful eating habits lifelong are the mainstay of weight control. Though there are significant health benefits from weight loss, long-term weight loss with diet alone may be very difficult to achieve- studies show long-term success with dietary management in less than 10% of people. Attaining a healthy weight may be especially difficult to achieve in those with severe obesity. In some cases, medications, devices and surgical management might be considered.  What can you do?  If you are overweight or obese and are interested in methods for weight loss, you should discuss  this with your provider.     Consider reducing daily calorie intake by 500 calories.     Keep a food journal.     Avoiding skipping meals, consider cutting portions instead.    Diet combined with exercise helps maintain muscle while optimizing fat loss. Strength training is particularly important for building and maintaining muscle mass. Exercise helps reduce stress, increase energy, and improves fitness. Increasing exercise without diet control, however, may not burn enough calories to loose weight.       Start walking three days a week 10-20 minutes at a time    Work towards walking thirty minutes five days a week     Eventually, increase the speed of your walking for 1-2 minutes at time    In addition, we recommend that you review healthy lifestyles and methods for weight loss available through the National Institutes of Health patient information sites:  http://win.niddk.nih.gov/publications/index.htm    And look into health and wellness programs that may be available through your health insurance provider, employer, local community center, or esme club.    Weight management plan: Patient was referred to their PCP to discuss a diet and exercise plan.              Follow-ups after your visit        Your next 10 appointments already scheduled     Aug 30, 2017  2:00 PM CDT   HST  with BK BED 5   [facility] ([facility])    [address] [place] [id number]   [phone]            Aug 31, 2017  3:00 PM CDT   HST Drop Off with [facility]   [facility] ([facility])    [address] [place] [id number]   [phone]              Future tests that were ordered for you today     Open Future Orders        Priority Expected Expires Ordered    HST-Home Sleep Apnea Test Routine  3/1/2018 8/30/2017            Who to contact     If you have questions or need follow up  information about today's clinic visit or your schedule please contact [facility] directly at [phone].  Normal or non-critical lab and imaging results will be communicated to you by MyChart, letter or phone within 4 business days after the clinic has received the results. If you do not hear from us within 7 days, please contact the clinic through [employer] or phone. If you have a critical or abnormal lab result, we will notify you by phone as soon as possible.  Submit refill requests through [employer] or call your pharmacy and they will forward the refill request to us. Please allow 3 business days for your refill to be completed.          Additional Information About Your Visit        [employer] Information     [employer] gives you secure access to your electronic health record. If you see a primary care provider, you can also send messages to your care team and make appointments. If you have questions, please call your primary care clinic.  If you do not have a primary care provider, please call [phone] and they will assist you.        Care EveryWhere ID     This is your Care EveryWhere ID. This could be used by other organizations to access your [place] medical records  [id number]        Your Vitals Were     Pulse Height Pulse Oximetry BMI (Body Mass Index)          69 1.829 m (6') 96% 37.89 kg/m2         Blood Pressure from Last 3 Encounters:   08/30/17 137/88   04/26/17 (!) 142/95   04/21/17 (!) 134/95    Weight from Last 3 Encounters:   08/30/17 126.7 kg (279 lb 6.4 oz)   04/26/17 122.5 kg (270 lb)   04/20/17 124.9 kg (275 lb 6.4 oz)              We Performed the Following     SLEEP EVALUATION & MANAGEMENT REFERRAL - ADULT        Primary Care Provider Office Phone # Fax #    DAWOOD Altamirano [phone] [address] [place] [address]        Equal Access to Services     RADHA INMAN AH: Valery Alcantar, waelliotda ilana, qaybta kagurdeep mcguire,  ellis canchola omkar thorne'aan ah. So [facility] [phone].    ATENCIÓN: Si habla luis, tiene a zaragoza disposición servicios gratuitos de asistencia lingüística. Gaby nicole [phone].    We comply with applicable federal civil rights laws and [place] laws. We do not discriminate on the basis of race, color, national origin, age, disability sex, sexual orientation or gender identity.            Thank you!     Thank you for choosing [facility]  for your care. Our goal is always to provide you with excellent care. Hearing back from our patients is one way we can continue to improve our services. Please take a few minutes to complete the written survey that you may receive in the mail after your visit with us. Thank you!             Your Updated Medication List - Protect others around you: Learn how to safely use, store and throw away your medicines at www.disposemymeds.org.          This list is accurate as of: 8/30/17  1:49 PM.  Always use your most recent med list.                   Brand Name Dispense Instructions for use Diagnosis    buPROPion 300 MG 24 hr tablet    WELLBUTRIN XL    30 tablet    TAKE 1 TAB BY MOUTH EVERY MORNING- disregard previous RX. Sangeetha Salazar    Recurrent major depressive disorder, remission status unspecified (H), Suicidal ideation, Generalized anxiety disorder, Mood disorder (H)       DAILY MULTIVITAMIN PO      Take 1 tablet by mouth daily.        pantoprazole 20 MG EC tablet    PROTONIX    60 tablet    TAKE 1 TAB BY MOUTH ONCE DAILY 30-60 MINUTES BEFORE A MEAL    Gastroesophageal reflux disease, esophagitis presence not specified

## 2017-08-30 NOTE — MR AVS SNAPSHOT
After Visit Summary   8/30/2017    Trung Dubois    MRN: [id number]           Patient Information     Date Of Birth          1965        Visit Information        Provider Department      8/30/2017 2:00 PM BK BED 5 [place] [facility]        Today's Diagnoses     Snoring        Morbid obesity due to excess calories (H)           Follow-ups after your visit        Your next 10 appointments already scheduled     Aug 31, 2017  3:00 PM CDT   HST Drop Off with BK SC DME   [place] [facility] ([facility])    [address] [place] [id number] [phone]              Who to contact     If you have questions or need follow up information about today's clinic visit or your schedule please contact [facility] directly at [phone].  Normal or non-critical lab and imaging results will be communicated to you by Twyxthart, letter or phone within 4 business days after the clinic has received the results. If you do not hear from us within 7 days, please contact the clinic through [employer] or phone. If you have a critical or abnormal lab result, we will notify you by phone as soon as possible.  Submit refill requests through [employer] or call your pharmacy and they will forward the refill request to us. Please allow 3 business days for your refill to be completed.          Additional Information About Your Visit        MyChart Information     [employer] gives you secure access to your electronic health record. If you see a primary care provider, you can also send messages to your care team and make appointments. If you have questions, please call your primary care clinic.  If you do not have a primary care provider, please call [phone] and they will assist you.        Care EveryWhere ID     This is your Care EveryWhere ID. This could be used by other organizations to access your [facility] medical records  [id number]          Blood Pressure from Last 3 Encounters:   08/30/17 137/88   04/26/17 (!) 142/95   04/21/17 (!) 134/95    Weight from Last 3 Encounters:   08/30/17 126.7 kg (279 lb 6.4 oz)   04/26/17 122.5 kg (270 lb)   04/20/17 124.9 kg (275 lb 6.4 oz)              We Performed the Following     HST-Home Sleep Apnea Test        Primary Care Provider Office Phone # Fax #    Sangeetha Salazar, DAWOOD [facility] [phone] [address] [facility] [address]        Equal Access to Services     [facility]: Hadii aad kateryna hadasho Sovinayak, waaxda luqadaha, qaybta kaalmada adeegyada, ellis shah . So [facility] [phone].    ATENCIÓN: Si habla español, tiene a zaragoza disposición servicios gratuitos de asistencia lingüística. [facility] [phone].    We comply with applicable federal civil rights laws and [place] laws. We do not discriminate on the basis of race, color, national origin, age, disability sex, sexual orientation or gender identity.            Thank you!     Thank you for choosing [facility]  for your care. Our goal is always to provide you with excellent care. Hearing back from our patients is one way we can continue to improve our services. Please take a few minutes to complete the written survey that you may receive in the mail after your visit with us. Thank you!             Your Updated Medication List - Protect others around you: Learn how to safely use, store and throw away your medicines at www.disposemymeds.org.          This list is accurate as of: 8/30/17  2:13 PM.  Always use your most recent med list.                   Brand Name Dispense Instructions for use Diagnosis    buPROPion 300 MG 24 hr tablet    WELLBUTRIN XL    30 tablet    TAKE 1 TAB BY MOUTH EVERY MORNING- disregard previous RX. Sangeetha Salazar    Recurrent major depressive disorder, remission status unspecified (H), Suicidal ideation, Generalized anxiety disorder, Mood disorder (H)       DAILY  MULTIVITAMIN PO      Take 1 tablet by mouth daily.        pantoprazole 20 MG EC tablet    PROTONIX    60 tablet    TAKE 1 TAB BY MOUTH ONCE DAILY 30-60 MINUTES BEFORE A MEAL    Gastroesophageal reflux disease, esophagitis presence not specified

## 2017-08-30 NOTE — NURSING NOTE
Chief Complaint   Patient presents with     Sleep Problem     consult-DOT required       Initial /88  Pulse 69  Ht 1.829 m (6')  Wt 126.7 kg (279 lb 6.4 oz)  SpO2 96%  BMI 37.89 kg/m2 Estimated body mass index is 37.89 kg/(m^2) as calculated from the following:    Height as of this encounter: 1.829 m (6').    Weight as of this encounter: 126.7 kg (279 lb 6.4 oz).  Medication Reconciliation: complete   Neck Cir (cm): 46 cm (8/30/2017 12:58 PM)  ESS 7    Ivy Taveras, CMA

## 2017-08-31 ENCOUNTER — DOCUMENTATION ONLY (OUTPATIENT)
Dept: SLEEP MEDICINE | Facility: CLINIC | Age: 52
End: 2017-08-31

## 2017-08-31 DIAGNOSIS — E66.01 MORBID OBESITY DUE TO EXCESS CALORIES (H): ICD-10-CM

## 2017-09-01 ENCOUNTER — TELEPHONE (OUTPATIENT)
Dept: SLEEP MEDICINE | Facility: CLINIC | Age: 52
End: 2017-09-01

## 2017-09-01 PROBLEM — G47.33 OSA (OBSTRUCTIVE SLEEP APNEA): Status: ACTIVE | Noted: 2017-09-01

## 2017-09-01 NOTE — PROCEDURES
WatchPAT - HOME SLEEP STUDY INTERPRETATION    Patient: Trung Dubois  MRN: [id number]  YOB: 1965  Study Date: 8/30/2017  Referring Provider: Sangeetha Salazar;   Ordering Provider: Souleymane Gordon MD     Indications for Home Study: Trung Dubois is a 52 year old male with a history of anxiety and morbid obesity who presents with symptoms suggestive of obstructive sleep apnea.    Estimated body mass index is 37.89 kg/(m^2) as calculated from the following:    Height as of an earlier encounter on 8/30/17: 1.829 m (6').    Weight as of an earlier encounter on 8/30/17: 126.7 kg (279 lb 6.4 oz).  Total score - [place]: 7 (8/30/2017 12:00 PM)  Total Score: 4 (8/30/2017  1:21 PM)    Data: A full night home sleep study was performed recording the standard physiologic parameters including peripheral arterial tonometry (PAT), sound/snoring, body position,  movement, sound, and oxygen saturation by pulse oximetry. Pulse rate was estimated by oximetry recording. Sleep staging (wake, REM, light, and deep sleep) was derived from PAT signal.  This study was considered adequate based on > 4 hours of quality oximetry and respiratory recording. As specified by the AASM Manual for the Scoring of Sleep and Associated events, version 2.3, Rule VIII.D 1B, 4% oxygen desaturation scoring for hypopneas is used as a standard of care on all home sleep apnea testing.    Total Recording Time: 7 hrs, 0 min  Total Sleep Time: 6 hrs, 9 min  % of Sleep Time REM: 17.1%    Respiratory:  Snoring: Snoring was present.  Respiratory events: The PAT respiratory disturbance index [pRDI] was 12.1 events per hour.  The PAT apnea/hypopnea index [pAHI] was 5.7 events per hour.  JACQUI was 4.9 events per hour.  During REM sleep the pAHI was 1.  Sleep Associated Hypoxemia: sustained hypoxemia was not present. Mean oxygen saturation was 95%.  Minimum was 91%.  Time with saturation less than 88% was 0 minutes.    Heart Rate: By pulse oximetry  normal rate was noted.     Position: Percent of time spent: supine - 28.3%, prone - 0%, on right - 9.6%, on left - 62.1%.  pAHI was 13.9 per hour supine, - per hour prone, 8.5 per hour on right side, and 1.6 per hour on left side.     Assessment:   Mild obstructive sleep apnea.  Sleep associated hypoxemia was not present.    Recommendations:  Consider auto-CPAP at 5-15 cmH2O, oral appliance therapy, positional therapy or no treatment (depending on symptom burden).  Suggest optimizing sleep hygiene and avoiding sleep deprivation.  Weight management.    Diagnosis Code(s): Obstructive Sleep Apnea G47.33    Souleymane Gordon MD, September 1, 2017   Diplomate, American Board of Internal Medicine, Sleep Medicine

## 2017-09-01 NOTE — LETTER
[facility]    [place]     [address]   [address]  [facility] [address]  [phone]          2017    Trung Dubois  [address] [address] [address]  [id number]  : 1965    Re: Sleep Study    To whom it may concern,    Trung Dubois underwent overnight sleep testing with chain-of-custody verification.  Testing revealed very mild obstructive sleep apnea (Apnea-Hypopnea Index 5.7 events/hr, with normal < 5/hr). Based on these findings and lack of daytime sleepiness complaint, we have agreed that not treating his mild Obstructive Sleep Apnea is reasonable and unlikely to affect driving ability, alertness or daytime functioning.      If you have any questions, please feel free to contact us.    Sincerely,      Souleymane Gordon MD    [facility]                    To

## 2017-09-01 NOTE — TELEPHONE ENCOUNTER
Patient called wondering if Dr. Gordon was able to review his Watch Pat results yet. He is hoping to get a call back today with the results. Thanks.

## 2017-09-01 NOTE — TELEPHONE ENCOUNTER
Called to inform patient he has very mild Obstructive Sleep Apnea. He prefers not to undergo treatment, which is reasonable given mild nature of disease.  He will call back if he changes his mind in the future.    Souleymane Gordon MD, Sleep Physician  Sep 1, 2017

## 2017-09-08 ENCOUNTER — TELEPHONE (OUTPATIENT)
Dept: FAMILY MEDICINE | Facility: CLINIC | Age: 52
End: 2017-09-08

## 2017-09-08 DIAGNOSIS — Z12.11 SPECIAL SCREENING FOR MALIGNANT NEOPLASMS, COLON: Primary | ICD-10-CM

## 2017-09-29 NOTE — PROGRESS NOTES
SUBJECTIVE:   Trung Dubois is a 52 year old male who presents to clinic today for the following health issues:      Depression and Anxiety Follow-Up    Status since last visit: Improved    Other associated symptoms:None    Complicating factors:     Significant life event: No     Current substance abuse: None    PHQ-9 SCORE 10/8/2014 6/29/2015 7/15/2016   Total Score 4 4 -   Total Score - - 6     ENID-7 SCORE 6/30/2015 7/15/2016 5/19/2017   Total Score 3 - -   Total Score - 4 12       PHQ-9  English  PHQ-9   Any Language  GAD7      Amount of exercise or physical activity: 2-3 days/week for an average of greater than 60 minutes    Problems taking medications regularly: No    Medication side effects: none  Diet: regular (no restrictions)  Rash  Onset: ongoing for 6 months     Description:   Location: rash on genitals   Character: flakey, red   Itching (Pruritis): YES    Progression of Symptoms:  same    Accompanying Signs & Symptoms:  Fever: no   Body aches or joint pain: no   Sore throat symptoms: YES  Recent cold symptoms: YES    History:   Previous similar rash: no     Precipitating factors:   Exposure to similar rash: no   New exposures: None   Recent travel: no     Alleviating factors:  See below     Therapies Tried and outcome: baby powder helped relive irritation.       Patient reports for rash on his genitals that he has had for 6 months. He has experienced some irritation and erythema. Patient has noticed a lump that looks like a blister or an ingrown hair. He expresses that it irritates him after he showers. He told his wife about it, and she was concerned.     Colon cancer screening  Patient tried to complete FIT test at home, however he had trouble mailing in sample.      Problem list and histories reviewed & adjusted, as indicated.  Additional history: as documented    Patient Active Problem List   Diagnosis     CARDIOVASCULAR SCREENING; LDL GOAL LESS THAN 160     GERD (gastroesophageal reflux  disease)     Mood disorder (H)     Suicidal ideation     Major depression     Generalized anxiety disorder     Morbid obesity (H)     FAUZIA (obstructive sleep apnea)     Past Surgical History:   Procedure Laterality Date     LASIK      left eye only     TONSILLECTOMY      AGE 5       Social History   Substance Use Topics     Smoking status: Never Smoker     Smokeless tobacco: Never Used     Alcohol use Yes      Comment: Light to moderate     Family History   Problem Relation Age of Onset     DIABETES Mother      Leukemia Mother      Psychotic Disorder Father      Depression/suicide     Family History Negative Other      Depression Brother      Suicide/Bipolar     Asthma No family hx of      C.A.D. No family hx of      Hypertension No family hx of      CEREBROVASCULAR DISEASE No family hx of      Breast Cancer No family hx of      Cancer - colorectal No family hx of      Prostate Cancer No family hx of      Alcohol/Drug No family hx of      Allergies No family hx of      Alzheimer Disease No family hx of      Anesthesia Reaction No family hx of      Blood Disease No family hx of      Arthritis No family hx of      CANCER No family hx of      Cardiovascular No family hx of      Circulatory No family hx of      Congenital Anomalies No family hx of      Connective Tissue Disorder No family hx of          Current Outpatient Prescriptions   Medication Sig Dispense Refill     buPROPion (WELLBUTRIN XL) 300 MG 24 hr tablet TAKE 1 TAB BY MOUTH EVERY MORNING- disregard previous RX. Sangeetha Salazar 30 tablet 0     pantoprazole (PROTONIX) 20 MG EC tablet TAKE 1 TAB BY MOUTH ONCE DAILY 30-60 MINUTES BEFORE A MEAL 60 tablet 0     Multiple Vitamin (DAILY MULTIVITAMIN PO) Take 1 tablet by mouth daily.           Reviewed and updated as needed this visit by clinical staffTobacco  Allergies  Med Hx  Surg Hx  Fam Hx  Soc Hx      Reviewed and updated as needed this visit by Provider         ROS:  Constitutional, neuro, ENT, endocrine,  "pulmonary, cardiac, gastrointestinal, genitourinary, musculoskeletal, integument and psychiatric systems are negative, except as otherwise noted.    This document serves as a record of the services and decisions personally performed and made by Sangeetha Salazar DNP. It was created on her behalf by Reba Dorman, a trained medical scribe. The creation of this document is based on the provider's statements to the medical scribe.  Reba Dorman 3:14 PM October 2, 2017    OBJECTIVE:                                                    /80  Pulse 74  Temp 98  F (36.7  C) (Temporal)  Resp 16  Ht 1.854 m (6' 1\")  Wt 126.9 kg (279 lb 11.2 oz)  SpO2 99%  BMI 36.9 kg/m2  Body mass index is 36.9 kg/(m^2).  GENERAL APPEARANCE: healthy, alert and no distress   (male): posterior scrotal ingrown hairs in folds, redness, some shearing and flaking, no lesions, no masses, otherwise testicles normal without atrophy or masses, no hernias and penis normal without urethral discharge  NEURO: Normal strength and tone, mentation intact and speech normal  PSYCH: mentation appears normal and affect normal/bright    Diagnostic test results:  Diagnostic Test Results:  No results found for this or any previous visit (from the past 24 hour(s)).       ASSESSMENT/PLAN:                                                        ICD-10-CM    1. Screening for colon cancer Z12.11 Fecal colorectal cancer screen FIT   2. Jock itch L29.8      Evaluated jock itch symptoms. Discussed home treatments to relieve discomfort including jock itch cream, baby powder, and to use a hair dryer on low to ensure dryness in affected area. Advised patient that it may take a few days for the area to get better.     Patient is going to complete a FIT test in the future for colon cancer screening.     Flu shot given today.     Follow up with Provider - Return to clinic if symptoms worsen.    All questions invited, asked and answered to the patient's apparent " satisfaction.  Patient agrees to plan.     The information in this document, created by the medical scribe for me, accurately reflects the services I personally performed and the decisions made by me. I have reviewed and approved this document for accuracy prior to leaving the patient care area.  October 2, 2017 3:14 PM     [facility] [place]

## 2017-10-02 ENCOUNTER — OFFICE VISIT (OUTPATIENT)
Dept: FAMILY MEDICINE | Facility: CLINIC | Age: 52
End: 2017-10-02
Payer: COMMERCIAL

## 2017-10-02 VITALS
SYSTOLIC BLOOD PRESSURE: 130 MMHG | BODY MASS INDEX: 37.07 KG/M2 | DIASTOLIC BLOOD PRESSURE: 80 MMHG | TEMPERATURE: 98 F | HEART RATE: 74 BPM | WEIGHT: 279.7 LBS | OXYGEN SATURATION: 99 % | HEIGHT: 73 IN | RESPIRATION RATE: 16 BRPM

## 2017-10-02 DIAGNOSIS — Z12.11 SCREENING FOR COLON CANCER: ICD-10-CM

## 2017-10-02 DIAGNOSIS — B35.6 JOCK ITCH: Primary | ICD-10-CM

## 2017-10-02 PROCEDURE — 99213 OFFICE O/P EST LOW 20 MIN: CPT | Performed by: NURSE PRACTITIONER

## 2017-10-02 ASSESSMENT — PAIN SCALES - GENERAL: PAINLEVEL: NO PAIN (0)

## 2017-10-02 NOTE — MR AVS SNAPSHOT
After Visit Summary   10/2/2017    Trung Dubois    MRN: [id number]           Patient Information     Date Of Birth          1965        Visit Information        Provider Department      10/2/2017 2:40 PM Sangeetha Salazar APRN CNP [facility] [place]        Today's Diagnoses     Jock itch    -  1    Screening for colon cancer           Follow-ups after your visit        Follow-up notes from your care team     Return if symptoms worsen or fail to improve.      Future tests that were ordered for you today     Open Future Orders        Priority Expected Expires Ordered    Fecal colorectal cancer screen FIT Routine 10/23/2017 12/25/2017 10/2/2017            Who to contact     If you have questions or need follow up information about today's clinic visit or your schedule please contact [facility] directly at [phone].  Normal or non-critical lab and imaging results will be communicated to you by MyChart, letter or phone within 4 business days after the clinic has received the results. If you do not hear from us within 7 days, please contact the clinic through MyChart or phone. If you have a critical or abnormal lab result, we will notify you by phone as soon as possible.  Submit refill requests through [employer] or call your pharmacy and they will forward the refill request to us. Please allow 3 business days for your refill to be completed.          Additional Information About Your Visit        MyChart Information     [employer] gives you secure access to your electronic health record. If you see a primary care provider, you can also send messages to your care team and make appointments. If you have questions, please call your primary care clinic.  If you do not have a primary care provider, please call [phone] and they will assist you.        Care EveryWhere ID     This is your Care EveryWhere ID. This could be used by other organizations to access your [facility] [employer]  "records  [id number]        Your Vitals Were     Pulse Temperature Respirations Height Pulse Oximetry BMI (Body Mass Index)    74 98  F (36.7  C) (Temporal) 16 6' 1\" (1.854 m) 99% 36.9 kg/m2       Blood Pressure from Last 3 Encounters:   10/02/17 130/80   08/30/17 137/88   04/26/17 (!) 142/95    Weight from Last 3 Encounters:   10/02/17 279 lb 11.2 oz (126.9 kg)   08/30/17 279 lb 6.4 oz (126.7 kg)   04/26/17 270 lb (122.5 kg)              We Performed the Following     DEPRESSION ACTION PLAN (DAP)        Primary Care Provider Office Phone # Fax #    [facility] [phone] [address] [facility] [address]        Equal Access to Services     [facility]: Hadii lorena avendaño hadasho Soomaali, waaxda luqadaha, qaybta kaalmada adeegyada, waxay cindyin haykanchan shah . So [facility] [phone].    ATENCIÓN: Si habla español, tiene a zaragoza disposición servicios gratuitos de asistencia lingüística. Gaby al [phone].    We comply with applicable federal civil rights laws and [place] laws. We do not discriminate on the basis of race, color, national origin, age, disability, sex, sexual orientation, or gender identity.            Thank you!     Thank you for choosing [facility]  for your care. Our goal is always to provide you with excellent care. Hearing back from our patients is one way we can continue to improve our services. Please take a few minutes to complete the written survey that you may receive in the mail after your visit with us. Thank you!             Your Updated Medication List - Protect others around you: Learn how to safely use, store and throw away your medicines at www.disposemymeds.org.          This list is accurate as of: 10/2/17  5:08 PM.  Always use your most recent med list.                   Brand Name Dispense Instructions for use Diagnosis    buPROPion 300 MG 24 hr tablet    WELLBUTRIN XL    30 tablet    TAKE 1 TAB BY MOUTH EVERY MORNING- " disregard previous RX. Sangeetha Salazar    Recurrent major depressive disorder, remission status unspecified (H), Suicidal ideation, Generalized anxiety disorder, Mood disorder (H)       DAILY MULTIVITAMIN PO      Take 1 tablet by mouth daily.        pantoprazole 20 MG EC tablet    PROTONIX    60 tablet    TAKE 1 TAB BY MOUTH ONCE DAILY 30-60 MINUTES BEFORE A MEAL    Gastroesophageal reflux disease, esophagitis presence not specified

## 2017-10-02 NOTE — LETTER
My Depression Action Plan  Name: Trung Dubois   Date of Birth 1965  Date: 9/29/2017    My doctor: Sangeetha Salazar   My clinic: [facility]  [id number] [place] [address], [address]  [place] [id number] [phone]          GREEN    ZONE   Good Control    What it looks like:     Things are going generally well. You have normal up s and down s. You may even feel depressed from time to time, but bad moods usually last less than a day.   What you need to do:  1. Continue to care for yourself (see self care plan)  2. Check your depression survival kit and update it as needed  3. Follow your physician s recommendations including any medication.  4. Do not stop taking medication unless you consult with your physician first.           YELLOW         ZONE Getting Worse    What it looks like:     Depression is starting to interfere with your life.     It may be hard to get out of bed; you may be starting to isolate yourself from others.    Symptoms of depression are starting to last most all day and this has happened for several days.     You may have suicidal thoughts but they are not constant.   What you need to do:     1. Call your care team, your response to treatment will improve if you keep your care team informed of your progress. Yellow periods are signs an adjustment may need to be made.     2. Continue your self-care, even if you have to fake it!    3. Talk to someone in your support network    4. Open up your depression survival kit           RED    ZONE Medical Alert - Get Help    What it looks like:     Depression is seriously interfering with your life.     You may experience these or other symptoms: You can t get out of bed most days, can t work or engage in other necessary activities, you have trouble taking care of basic hygiene, or basic responsibilities, thoughts of suicide or death that will not go away, self-injurious behavior.     What you need to do:  1. Call your care team  and request a same-day appointment. If they are not available (weekends or after hours) call your local crisis line, emergency room or 911.      Electronically signed by: Malcom Nguyen, September 29, 2017    Depression Self Care Plan / Survival Kit    Self-Care for Depression  Here s the deal. Your body and mind are really not as separate as most people think.  What you do and think affects how you feel and how you feel influences what you do and think. This means if you do things that people who feel good do, it will help you feel better.  Sometimes this is all it takes.  There is also a place for medication and therapy depending on how severe your depression is, so be sure to consult with your medical provider and/ or Behavioral Health Consultant if your symptoms are worsening or not improving.     In order to better manage my stress, I will:    Exercise  Get some form of exercise, every day. This will help reduce pain and release endorphins, the  feel good  chemicals in your brain. This is almost as good as taking antidepressants!  This is not the same as joining a gym and then never going! (they count on that by the way ) It can be as simple as just going for a walk or doing some gardening, anything that will get you moving.      Hygiene   Maintain good hygiene (Get out of bed in the morning, Make your bed, Brush your teeth, Take a shower, and Get dressed like you were going to work, even if you are unemployed).  If your clothes don't fit try to get ones that do.    Diet  I will strive to eat foods that are good for me, drink plenty of water, and avoid excessive sugar, caffeine, alcohol, and other mood-altering substances.  Some foods that are helpful in depression are: complex carbohydrates, B vitamins, flaxseed, fish or fish oil, fresh fruits and vegetables.    Psychotherapy  I agree to participate in Individual Therapy (if recommended).    Medication  If prescribed medications, I agree to take them.  Missing  doses can result in serious side effects.  I understand that drinking alcohol, or other illicit drug use, may cause potential side effects.  I will not stop my medication abruptly without first discussing it with my provider.    Staying Connected With Others  I will stay in touch with my friends, family members, and my primary care provider/team.    Use your imagination  Be creative.  We all have a creative side; it doesn t matter if it s oil painting, sand castles, or mud pies! This will also kick up the endorphins.    Witness Beauty  (AKA stop and smell the roses) Take a look outside, even in mid-winter. Notice colors, textures. Watch the squirrels and birds.     Service to others  Be of service to others.  There is always someone else in need.  By helping others we can  get out of ourselves  and remember the really important things.  This also provides opportunities for practicing all the other parts of the program.    Humor  Laugh and be silly!  Adjust your TV habits for less news and crime-drama and more comedy.    Control your stress  Try breathing deep, massage therapy, biofeedback, and meditation. Find time to relax each day.     My support system    Clinic Contact:  Phone number:    Contact 1:  Phone number:    Contact 2:  Phone number:    Rastafarian/:  Phone number:    Therapist:  Phone number:    Local crisis center:    Phone number:    Other community support:  Phone number:

## 2017-10-02 NOTE — NURSING NOTE
"Chief Complaint   Patient presents with     Derm Problem     on genitals      Panel Management     phq/moira, fit, flu shot DAP        Initial /80  Pulse 74  Temp 98  F (36.7  C) (Temporal)  Resp 16  Ht 6' 1\" (1.854 m)  Wt 279 lb 11.2 oz (126.9 kg)  SpO2 99%  BMI 36.9 kg/m2 Estimated body mass index is 36.9 kg/(m^2) as calculated from the following:    Height as of this encounter: 6' 1\" (1.854 m).    Weight as of this encounter: 279 lb 11.2 oz (126.9 kg).  Medication Reconciliation: complete     Winsome Tovar MA       "

## 2017-10-13 DIAGNOSIS — Z12.11 SCREENING FOR COLON CANCER: ICD-10-CM

## 2017-10-13 PROCEDURE — 82274 ASSAY TEST FOR BLOOD FECAL: CPT | Performed by: NURSE PRACTITIONER

## 2017-10-15 LAB — HEMOCCULT STL QL IA: NEGATIVE

## 2017-10-26 ENCOUNTER — TELEPHONE (OUTPATIENT)
Dept: FAMILY MEDICINE | Facility: CLINIC | Age: 52
End: 2017-10-26

## 2017-10-26 DIAGNOSIS — R45.851 SUICIDAL IDEATION: ICD-10-CM

## 2017-10-26 DIAGNOSIS — F41.1 GENERALIZED ANXIETY DISORDER: ICD-10-CM

## 2017-10-26 DIAGNOSIS — F39 MOOD DISORDER (H): ICD-10-CM

## 2017-10-26 DIAGNOSIS — F33.9 RECURRENT MAJOR DEPRESSIVE DISORDER, REMISSION STATUS UNSPECIFIED (H): ICD-10-CM

## 2017-10-26 RX ORDER — BUPROPION HYDROCHLORIDE 300 MG/1
TABLET ORAL
Qty: 30 TABLET | Refills: 0 | Status: SHIPPED | OUTPATIENT
Start: 2017-10-26 | End: 2017-10-27

## 2017-10-26 ASSESSMENT — PATIENT HEALTH QUESTIONNAIRE - PHQ9: SUM OF ALL RESPONSES TO PHQ QUESTIONS 1-9: 8

## 2017-10-26 NOTE — TELEPHONE ENCOUNTER
Routing refill request to provider for review/approval because:  Needs updated Phq-9  Break in medication  Katelynn Bettencourt RN      [place]       Last Written Prescription Date: 7/20/2017  Last Fill Quantity: 30; # refills: 0  Last Office Visit with [facility], P or [facility] prescribing provider:  10/2/2017        Last PHQ-9 score on record=   PHQ-9 SCORE 7/15/2016   Total Score -   Total Score 6       Lab Results   Component Value Date    AST 13 06/29/2015     Lab Results   Component Value Date    ALT 28 06/29/2015

## 2017-10-26 NOTE — TELEPHONE ENCOUNTER
Call pt. Will give on 30 day supply, but needs to see psychiatry for eval. For further refills- this was recommended in May. Needs updated PHQ-9. Sangeetha Salazar

## 2017-10-26 NOTE — TELEPHONE ENCOUNTER
Reason for call:  Medication  Reason for Call:  Medication or medication refill:    Do you use a [facility]?  Name of the pharmacy and phone number for the current request:  [facility] - [phone]    Name of the medication requested: buPROPion (WELLBUTRIN XL) 300 MG 24 hr tablet    Other request: pt would like a call when this is sent over, he would like to get it today.     Can we leave a detailed message on this number? YES    Phone number patient can be reached at: Home number on file [phone] (home)    Best Time: anytime    Call taken on 10/26/2017 at 1:04 PM by Rose Hair

## 2017-10-27 RX ORDER — BUPROPION HYDROCHLORIDE 300 MG/1
TABLET ORAL
Qty: 90 TABLET | Refills: 0 | Status: SHIPPED | OUTPATIENT
Start: 2017-10-27 | End: 2018-06-29

## 2017-10-27 NOTE — TELEPHONE ENCOUNTER
Pt returned call states has appt [facility] -soonest he could get in. Pt states only has one month of medication left. Please contact pt in regards [phone]

## 2017-11-29 ENCOUNTER — TELEPHONE (OUTPATIENT)
Dept: FAMILY MEDICINE | Facility: CLINIC | Age: 52
End: 2017-11-29

## 2017-11-29 DIAGNOSIS — K21.9 GASTROESOPHAGEAL REFLUX DISEASE, ESOPHAGITIS PRESENCE NOT SPECIFIED: ICD-10-CM

## 2017-11-29 NOTE — TELEPHONE ENCOUNTER
Reason for Call:  Medication or medication refill:    Do you use a [facility]?  Name of the pharmacy and phone number for the current request:  [facility]     Name of the medication requested: pantoprazole (PROTONIX) 20 MG EC tablet    Other request: none     Can we leave a detailed message on this number? YES    Phone number patient can be reached at: Home number on file [phone] (home)    Best Time: any     Call taken on 11/29/2017 at 4:18 PM by Ivy Hammer

## 2017-11-29 NOTE — TELEPHONE ENCOUNTER
Routing refill request to provider for review/approval because:  Appears to be a break in medication - should have been out around 8/14/17    Liseth Reyes, RN, BSN

## 2017-11-30 RX ORDER — PANTOPRAZOLE SODIUM 20 MG/1
TABLET, DELAYED RELEASE ORAL
Qty: 90 TABLET | Refills: 0 | Status: SHIPPED | OUTPATIENT
Start: 2017-11-30 | End: 2018-03-02

## 2017-11-30 NOTE — TELEPHONE ENCOUNTER
Refilled X1, advise OV to discuss, may need further testing done for this as chronically on this medication.Sangeetha Salazar
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
## 2017-12-07 ENCOUNTER — OFFICE VISIT (OUTPATIENT)
Dept: FAMILY MEDICINE | Facility: CLINIC | Age: 52
End: 2017-12-07
Payer: COMMERCIAL

## 2017-12-07 VITALS
WEIGHT: 274 LBS | TEMPERATURE: 98.2 F | SYSTOLIC BLOOD PRESSURE: 120 MMHG | HEART RATE: 74 BPM | DIASTOLIC BLOOD PRESSURE: 80 MMHG | RESPIRATION RATE: 12 BRPM | HEIGHT: 73 IN | BODY MASS INDEX: 36.31 KG/M2

## 2017-12-07 DIAGNOSIS — R45.851 SUICIDAL IDEATION: ICD-10-CM

## 2017-12-07 DIAGNOSIS — F33.9 RECURRENT MAJOR DEPRESSIVE DISORDER, REMISSION STATUS UNSPECIFIED (H): ICD-10-CM

## 2017-12-07 DIAGNOSIS — F39 MOOD DISORDER (H): ICD-10-CM

## 2017-12-07 DIAGNOSIS — F41.1 GENERALIZED ANXIETY DISORDER: ICD-10-CM

## 2017-12-07 DIAGNOSIS — E66.01 MORBID OBESITY (H): ICD-10-CM

## 2017-12-07 DIAGNOSIS — Z00.01 ENCOUNTER FOR ROUTINE ADULT HEALTH EXAMINATION WITH ABNORMAL FINDINGS: Primary | ICD-10-CM

## 2017-12-07 LAB
CHOLEST SERPL-MCNC: 191 MG/DL
GLUCOSE SERPL-MCNC: 136 MG/DL (ref 70–99)
HDLC SERPL-MCNC: 41 MG/DL
LDLC SERPL CALC-MCNC: 105 MG/DL
NONHDLC SERPL-MCNC: 150 MG/DL
PSA SERPL-ACNC: 1.32 UG/L (ref 0–4)
TRIGL SERPL-MCNC: 226 MG/DL

## 2017-12-07 PROCEDURE — 36415 COLL VENOUS BLD VENIPUNCTURE: CPT | Performed by: NURSE PRACTITIONER

## 2017-12-07 PROCEDURE — G0103 PSA SCREENING: HCPCS | Performed by: NURSE PRACTITIONER

## 2017-12-07 PROCEDURE — 82947 ASSAY GLUCOSE BLOOD QUANT: CPT | Performed by: NURSE PRACTITIONER

## 2017-12-07 PROCEDURE — 80061 LIPID PANEL: CPT | Performed by: NURSE PRACTITIONER

## 2017-12-07 PROCEDURE — 99396 PREV VISIT EST AGE 40-64: CPT | Performed by: NURSE PRACTITIONER

## 2017-12-07 ASSESSMENT — PAIN SCALES - GENERAL: PAINLEVEL: NO PAIN (0)

## 2017-12-07 NOTE — NURSING NOTE
"Chief Complaint   Patient presents with     Physical     Panel Management     flu. phq9/moira       Initial /90  Pulse 74  Temp 98.2  F (36.8  C) (Oral)  Resp 12  Ht 6' 1\" (1.854 m)  Wt 274 lb (124.3 kg)  BMI 36.15 kg/m2 Estimated body mass index is 36.15 kg/(m^2) as calculated from the following:    Height as of this encounter: 6' 1\" (1.854 m).    Weight as of this encounter: 274 lb (124.3 kg).  Medication Reconciliation: complete       Malcom Nguyen MA    "

## 2017-12-07 NOTE — MR AVS SNAPSHOT
After Visit Summary   12/7/2017    Trung Dubois    MRN: [id number]           Patient Information     Date Of Birth          1965        Visit Information        Provider Department      12/7/2017 3:00 PM Sangeetha Salazar APRN [facility]        Today's Diagnoses     Encounter for routine adult health examination with abnormal findings    -  1    Recurrent major depressive disorder, remission status unspecified (H)        Suicidal ideation        Generalized anxiety disorder        Mood disorder (H)        BMI over 36          Care Instructions      Preventive Health Recommendations  Male Ages 50 - 64    Yearly exam:             See your health care provider every year in order to  o   Review health changes.   o   Discuss preventive care.    o   Review your medicines if your doctor has prescribed any.     Have a cholesterol test every 5 years, or more frequently if you are at risk for high cholesterol/heart disease.     Have a diabetes test (fasting glucose) every three years. If you are at risk for diabetes, you should have this test more often.     Have a colonoscopy at age 50, or have a yearly FIT test (stool test). These exams will check for colon cancer.      Talk with your health care provider about whether or not a prostate cancer screening test (PSA) is right for you.    You should be tested each year for STDs (sexually transmitted diseases), if you re at risk.     Shots: Get a flu shot each year. Get a tetanus shot every 10 years.     Nutrition:    Eat at least 5 servings of fruits and vegetables daily.     Eat whole-grain bread, whole-wheat pasta and brown rice instead of white grains and rice.     Talk to your provider about Calcium and Vitamin D.     Lifestyle    Exercise for at least 150 minutes a week (30 minutes a day, 5 days a week). This will help you control your weight and prevent disease.     Limit alcohol to one drink per day.     No smoking.     Wear  "sunscreen to prevent skin cancer.     See your dentist every six months for an exam and cleaning.     See your eye doctor every 1 to 2 years.            Follow-ups after your visit        Who to contact     If you have questions or need follow up information about today's clinic visit or your schedule please contact [facility] directly at [phone].  Normal or non-critical lab and imaging results will be communicated to you by MyChart, letter or phone within 4 business days after the clinic has received the results. If you do not hear from us within 7 days, please contact the clinic through [employer] or phone. If you have a critical or abnormal lab result, we will notify you by phone as soon as possible.  Submit refill requests through [employer] or call your pharmacy and they will forward the refill request to us. Please allow 3 business days for your refill to be completed.          Additional Information About Your Visit        [facility] Information     [employer] gives you secure access to your electronic health record. If you see a primary care provider, you can also send messages to your care team and make appointments. If you have questions, please call your primary care clinic.  If you do not have a primary care provider, please call [phone] and they will assist you.        Care EveryWhere ID     This is your Care EveryWhere ID. This could be used by other organizations to access your [facility] medical records  [id number]        Your Vitals Were     Pulse Temperature Respirations Height BMI (Body Mass Index)       74 98.2  F (36.8  C) (Oral) 12 6' 1\" (1.854 m) 36.15 kg/m2        Blood Pressure from Last 3 Encounters:   12/07/17 120/80   10/02/17 130/80   08/30/17 137/88    Weight from Last 3 Encounters:   12/07/17 274 lb (124.3 kg)   10/02/17 279 lb 11.2 oz (126.9 kg)   08/30/17 279 lb 6.4 oz (126.7 kg)              We Performed the Following     Glucose     Lipid panel reflex to direct LDL " Non-fasting     Prostate spec antigen screen        Primary Care Provider Office Phone # Fax #    Sangeetha Salazar, DAWOOD [facility] [phone] [id number] [facility] [address]        Equal Access to Services     RADHA INMAN : Hadlawrence lorena ku shravano Soomaali, waaxda luqadaha, qaybta kaalmada adeegyada, waxjoseph idiin hayaan omkar allen alyssa armstrong. So [facility] [phone].    ATENCIÓN: Si habla español, tiene a zaragoza disposición servicios gratuitos de asistencia lingüística. Llame al [phone].    We comply with applicable federal civil rights laws and [place] laws. We do not discriminate on the basis of race, color, national origin, age, disability, sex, sexual orientation, or gender identity.            Thank you!     Thank you for choosing [facility]  for your care. Our goal is always to provide you with excellent care. Hearing back from our patients is one way we can continue to improve our services. Please take a few minutes to complete the written survey that you may receive in the mail after your visit with us. Thank you!             Your Updated Medication List - Protect others around you: Learn how to safely use, store and throw away your medicines at www.disposemymeds.org.          This list is accurate as of: 12/7/17  4:46 PM.  Always use your most recent med list.                   Brand Name Dispense Instructions for use Diagnosis    buPROPion 300 MG 24 hr tablet    WELLBUTRIN XL    90 tablet    TAKE 1 TAB BY MOUTH EVERY MORNING- Please see psychiatry for further refills.    Recurrent major depressive disorder, remission status unspecified (H), Suicidal ideation, Generalized anxiety disorder, Mood disorder (H)       DAILY MULTIVITAMIN PO      Take 1 tablet by mouth daily.        pantoprazole 20 MG EC tablet    PROTONIX    90 tablet    TAKE 1 TAB BY MOUTH ONCE DAILY 30-60 MINUTES BEFORE A MEAL    Gastroesophageal reflux disease, esophagitis presence not specified

## 2018-02-05 ENCOUNTER — NURSE TRIAGE (OUTPATIENT)
Dept: NURSING | Facility: CLINIC | Age: 53
End: 2018-02-05

## 2018-02-05 NOTE — TELEPHONE ENCOUNTER
Reason for Disposition    [1] Caller requesting NON-URGENT health information AND [2] PCP's office is the best resource    Additional Information    Negative: [1] Caller is not with the adult (patient) AND [2] reporting urgent symptoms    Negative: Lab result questions    Negative: Medication questions    Negative: Caller cannot be reached by phone    Negative: Caller has already spoken to PCP or another triager    Negative: RN needs further essential information from caller in order to complete triage    Negative: Requesting regular office appointment    Protocols used: INFORMATION ONLY CALL-ADULT-  Caller is requesting copy of mental health referral. No documents noted in the chart. Caller states he signed a release for the other clinic to merge medical records with [facility] due to caller not sure of what medication he should be taken and he is having anxiety because of it. Triage guidelines requested to call clinic in the am. Patient understands and verbalized directives.

## 2018-02-26 ENCOUNTER — TELEPHONE (OUTPATIENT)
Dept: NURSING | Facility: CLINIC | Age: 53
End: 2018-02-26

## 2018-02-26 ENCOUNTER — TELEPHONE (OUTPATIENT)
Dept: FAMILY MEDICINE | Facility: CLINIC | Age: 53
End: 2018-02-26

## 2018-02-26 ENCOUNTER — OFFICE VISIT (OUTPATIENT)
Dept: FAMILY MEDICINE | Facility: CLINIC | Age: 53
End: 2018-02-26
Payer: COMMERCIAL

## 2018-02-26 ENCOUNTER — NURSE TRIAGE (OUTPATIENT)
Dept: NURSING | Facility: CLINIC | Age: 53
End: 2018-02-26

## 2018-02-26 VITALS
HEART RATE: 76 BPM | RESPIRATION RATE: 18 BRPM | WEIGHT: 275.1 LBS | DIASTOLIC BLOOD PRESSURE: 78 MMHG | TEMPERATURE: 98.8 F | SYSTOLIC BLOOD PRESSURE: 138 MMHG | HEIGHT: 73 IN | BODY MASS INDEX: 36.46 KG/M2 | OXYGEN SATURATION: 97 %

## 2018-02-26 DIAGNOSIS — R35.0 URINARY FREQUENCY: ICD-10-CM

## 2018-02-26 DIAGNOSIS — F39 MOOD DISORDER (H): Primary | ICD-10-CM

## 2018-02-26 DIAGNOSIS — R45.851 SUICIDAL IDEATION: ICD-10-CM

## 2018-02-26 DIAGNOSIS — R82.90 CLOUDY URINE: ICD-10-CM

## 2018-02-26 LAB
ALBUMIN UR-MCNC: ABNORMAL MG/DL
APPEARANCE UR: CLEAR
BILIRUB UR QL STRIP: NEGATIVE
COLOR UR AUTO: YELLOW
GLUCOSE UR STRIP-MCNC: NEGATIVE MG/DL
HGB UR QL STRIP: NEGATIVE
KETONES UR STRIP-MCNC: NEGATIVE MG/DL
LEUKOCYTE ESTERASE UR QL STRIP: NEGATIVE
NITRATE UR QL: NEGATIVE
PH UR STRIP: 7 PH (ref 5–7)
RBC #/AREA URNS AUTO: ABNORMAL /HPF
SOURCE: ABNORMAL
SP GR UR STRIP: 1.02 (ref 1–1.03)
UROBILINOGEN UR STRIP-ACNC: 1 EU/DL (ref 0.2–1)
WBC #/AREA URNS AUTO: ABNORMAL /HPF

## 2018-02-26 PROCEDURE — 99214 OFFICE O/P EST MOD 30 MIN: CPT | Performed by: PHYSICIAN ASSISTANT

## 2018-02-26 PROCEDURE — 81001 URINALYSIS AUTO W/SCOPE: CPT | Performed by: PHYSICIAN ASSISTANT

## 2018-02-26 RX ORDER — TOPIRAMATE 50 MG/1
TABLET, FILM COATED ORAL
Refills: 1 | COMMUNITY
Start: 2018-01-16 | End: 2019-05-30

## 2018-02-26 ASSESSMENT — PAIN SCALES - GENERAL: PAINLEVEL: NO PAIN (0)

## 2018-02-26 NOTE — TELEPHONE ENCOUNTER
Recommend that patient schedule an appointment. I have not seen for some time and not for these concerns.     Can offer to speak with the RN today.

## 2018-02-26 NOTE — PROGRESS NOTES
"  SUBJECTIVE:   Trung Dubois is a 52 year old male who presents to clinic today for the following health issues:      HPI       Depression and Anxiety Follow-Up    Status since last visit: Worsened     Other associated symptoms:None    Complicating factors:     Significant life event: Yes-  Mother in law is in  vegetated state     Current substance abuse: Alcohol light to moderate     Pts mood disorder previously managed in primary care. Has been referred to psychiatry for med management for past 6-8 months since visit with PCP 07/2017.  Saw [facility]- Mayra Roberson - Jan 2018. Started on topiramate - side effects - \"bladder infection like symptoms\" - cloudy urine and odor.   He called [facility] about sx -they want him to get sx checked in primary care to rule out UTI and they told him not likely med side effect.   Pt states \"They didn't believe me. I lost trust in them. So I canceled the follow up appointment\".   He was also upset the psychiatric provider \"was running behind, so I only got 30 minutes out of my 60min visit. I shouldn't have to pay for the whole 60 minutes\".   He stopped topamax and urinary sx got better.   Side effect of weight loss- \"which I liked\" being on it.  Thought Topamax did help his mood.  Felt better when he was taking it- \"I was mellower\". Wife noticed an improvement.   When not on medications:I am on edge. Little things make me panic.   Trusts his therapist- Celena Howard. Sees weekly to a few times per month.    Refusing pHQ9 and GAD7 :\"I'm not going to answer those right now. It won't look good.\"  Denies SI/HI.   Thoughts of suicide that are brief, \"pushes them out\" of his mind. States wouldn't do that to his son and wife. \"My brother commit suicide and I saw what that did to his kids. I won't do that\".  Brother and father commit suicide. \"People tell me its hereditary. I'm don't think it is, but I'm going to stop that pattern\".     Wellbutrin wife thought " "helped. He didn't think it helped. His therapist thinks he has a season component to his sx.     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 Thinks records could be monitored and would lose license. Denies feeling paranoid- just about my ability to work. Denies delusions, hallucinations.       PHQ-9 7/15/2016 5/19/2017 10/26/2017   Total Score 6 - 8   Q9: Suicide Ideation Several days (No Data) More than half the days     ENID-7 SCORE 6/30/2015 7/15/2016 5/19/2017   Total Score 3 - -   Total Score - 4 12     PHQ-9  English  PHQ-9   Any Language  ENID-7  Suicide Assessment Five-step Evaluation and Treatment (SAFE-T)  Problem list and histories reviewed & adjusted, as indicated.  Additional history: as documented      Patient Active Problem List   Diagnosis     CARDIOVASCULAR SCREENING; LDL GOAL LESS THAN 160     GERD (gastroesophageal reflux disease)     Mood disorder (H)     Suicidal ideation     Major depression     Generalized anxiety disorder     BMI over 36     FAUZIA (obstructive sleep apnea)     Past Surgical History:   Procedure Laterality Date     LASIK      left eye only     TONSILLECTOMY      AGE 5       Social History   Substance Use Topics     Smoking status: Never Smoker     Smokeless tobacco: Never Used     Alcohol use Yes      Comment: Light to moderate     Family History   Problem Relation Age of Onset     DIABETES Mother      Leukemia Mother      Psychotic Disorder Father      Depression/suicide     Family History Negative Other      Depression Brother      Suicide/Bipolar     Asthma No family hx of      C.A.D. No family hx of      Hypertension No family hx of      CEREBROVASCULAR DISEASE No family hx of      Breast Cancer No family hx of      Cancer - colorectal No family hx of      Prostate Cancer No family hx of      Alcohol/Drug No family hx of      Allergies No family hx of      Alzheimer Disease No " "family hx of      Anesthesia Reaction No family hx of      Blood Disease No family hx of      Arthritis No family hx of      CANCER No family hx of      Cardiovascular No family hx of      Circulatory No family hx of      Congenital Anomalies No family hx of      Connective Tissue Disorder No family hx of          Current Outpatient Prescriptions   Medication Sig Dispense Refill     topiramate (TOPAMAX) 50 MG tablet TAKE 1 TAB DAILY FOR 1WK THEN 2 TABS FOR 1WK THEN 3 TABS FOR 1WK THEN 4 TABS AT BEDTIME  1     pantoprazole (PROTONIX) 20 MG EC tablet TAKE 1 TAB BY MOUTH ONCE DAILY 30-60 MINUTES BEFORE A MEAL 90 tablet 0     buPROPion (WELLBUTRIN XL) 300 MG 24 hr tablet TAKE 1 TAB BY MOUTH EVERY MORNING- Please see psychiatry for further refills. 90 tablet 0     Multiple Vitamin (DAILY MULTIVITAMIN PO) Take 1 tablet by mouth daily.       Allergies   Allergen Reactions     Prevacid [Lansoprazole] Nausea     BP Readings from Last 3 Encounters:   12/07/17 120/80   10/02/17 130/80   08/30/17 137/88    Wt Readings from Last 3 Encounters:   02/26/18 275 lb 1.6 oz (124.8 kg)   12/07/17 274 lb (124.3 kg)   10/02/17 279 lb 11.2 oz (126.9 kg)                  Labs reviewed in EPIC    ROS:  Constitutional, HEENT, cardiovascular, pulmonary, gi and gu systems are negative, except as otherwise noted.    OBJECTIVE:     /78  Pulse 76  Temp 98.8  F (37.1  C) (Temporal)  Resp 18  Ht 6' 0.5\" (1.842 m)  Wt 275 lb 1.6 oz (124.8 kg)  SpO2 97%  BMI 36.8 kg/m2  Body mass index is 36.8 kg/(m^2).  GENERAL: healthy, alert and no distress  NECK: no adenopathy, no asymmetry, masses, or scars and thyroid normal to palpation  RESP: lungs clear to auscultation - no rales, rhonchi or wheezes  CV: regular rate and rhythm, normal S1 S2, no S3 or S4, no murmur, click or rub, no peripheral edema and peripheral pulses strong  ABDOMEN: soft, nontender, no hepatosplenomegaly, no masses and bowel sounds normal  MS: no gross musculoskeletal " defects noted, no edema  NEURO: Normal strength and tone, mentation intact and speech normal  BACK: no CVA tenderness, no paralumbar tenderness  PSYCH: mentation appears normal, affect irritable. Speech normal volume. Argumentative. Eye contact good. No SI/HI. No delusions, hallucinations.     Diagnostic Test Results:  Results for orders placed or performed in visit on 02/26/18   UA with Microscopic   Result Value Ref Range    Color Urine Yellow     Appearance Urine Clear     Glucose Urine Negative NEG^Negative mg/dL    Bilirubin Urine Negative NEG^Negative    Ketones Urine Negative NEG^Negative mg/dL    Specific Gravity Urine 1.020 1.003 - 1.035    pH Urine 7.0 5.0 - 7.0 pH    Protein Albumin Urine Trace (A) NEG^Negative mg/dL    Urobilinogen Urine 1.0 0.2 - 1.0 EU/dL    Nitrite Urine Negative NEG^Negative    Blood Urine Negative NEG^Negative    Leukocyte Esterase Urine Negative NEG^Negative    Source Midstream Urine     WBC Urine O - 2 OTO2^O - 2 /HPF    RBC Urine O - 2 OTO2^O - 2 /HPF       ASSESSMENT/PLAN:     1. Mood disorder (H)  Discussed with pt, I am in agreement with PCP that pt should be seen through psychiatry  Discussed his feelings about interactions with [facility].   Unclear how urinary sx would be related to topamax- reviewed Up To Date side effect profile.   Normal UA today. Pt would like to retry topamax since he found it helpful. If urinary sx return - come in for UA- future order placed.   Gave alternate psych providers for pt to consider.     2. Suicidal ideation  No active thoughts, plan or intent.   Discussed family history in detail. Fam hx is deterrent for pt and motivating factor to get help.     3. Urinary frequency  As above  - UA with Microscopic    4. Cloudy urine  Future order if sx return  - UA with Microscopic; Future    Follow Up: For worsening symptoms (ie new fevers, worsening pain, etc), non-improvement as expected/discussed, questions regarding your medications or treatment plan.  Discussed parameters for follow up and included in After Visit Summary given to patient.      Liseth Mckeon PA-C  [facility]

## 2018-02-26 NOTE — TELEPHONE ENCOUNTER
I called pt per  to see how things are going. He states he did go [facility] and was given a medication that helped and he liked the side effect of losing weight. However he felt like he had a bladder infection. He does not recall the name of the medication. He did not like Nystroms. He likes his therapist Celena Newsome and he is hoping  will work with her. He has a lot going on in his life, MIL is sick.  Will keep appt today.   informed    Audra Traore, RN, BSN

## 2018-02-26 NOTE — MR AVS SNAPSHOT
After Visit Summary   2/26/2018    Trung Dubois    MRN: [id number]           Patient Information     Date Of Birth          1965        Visit Information        Provider Department      2/26/2018 3:20 PM Liseth Mckeon PA-C [facility] [place]        Today's Diagnoses     Mood disorder (H)    -  1    Suicidal ideation        Urinary frequency          Care Instructions    Your urine does not look like an infection is present.     You could try the topirimate again. If the symptoms return, then stop it.     I still would like your medications to be managed by psychiatry (medical doctors that prescribe)  Fine to stay with your therapist    Psychiatrists/Psychologists/Therapists:    José Miguel Bowling  [address] [place], [place] [address] [phone]    [facility]  [address]  [facility] [address] [phone]    [facility]:  [place]   Phone: [phone]   Fax:[phone]     [facility]:  [place]  [address] [place] [address]  Ph:   Fax: [phone]    [facility]):  Bobby Mckeon [place], [place], [place]  [phone]    [facility]:  [facility]  Phone: [phone]  Fax: [phone]      CRISIS evaluation: nearest ER or 911               Follow-ups after your visit        Who to contact     If you have questions or need follow up information about today's clinic visit or your schedule please contact [facility] directly at [phone].  Normal or non-critical lab and imaging results will be communicated to you by MyChart, letter or phone within 4 business days after the clinic has received the results. If you do not hear from us within 7 days, please contact the clinic through MyChart or phone. If you have a critical or abnormal lab result, we will notify you by phone as  "soon as possible.  Submit refill requests through hulu or call your pharmacy and they will forward the refill request to us. Please allow 3 business days for your refill to be completed.          Additional Information About Your Visit        Tencenthart Information     hulu gives you secure access to your electronic health record. If you see a primary care provider, you can also send messages to your care team and make appointments. If you have questions, please call your primary care clinic.  If you do not have a primary care provider, please call [phone] and they will assist you.        Care EveryWhere ID     This is your Care EveryWhere ID. This could be used by other organizations to access your [facility] medical records  [id number]        Your Vitals Were     Pulse Temperature Respirations Height Pulse Oximetry BMI (Body Mass Index)    76 98.8  F (37.1  C) (Temporal) 18 6' 0.5\" (1.842 m) 97% 36.8 kg/m2       Blood Pressure from Last 3 Encounters:   02/26/18 138/78   12/07/17 120/80   10/02/17 130/80    Weight from Last 3 Encounters:   02/26/18 275 lb 1.6 oz (124.8 kg)   12/07/17 274 lb (124.3 kg)   10/02/17 279 lb 11.2 oz (126.9 kg)              We Performed the Following     DEPRESSION ACTION PLAN (DAP)     UA with Microscopic        Primary Care Provider Office Phone # Fax #    DAWOOD Altamirano [facility] [phone] [address] [address]        Equal Access to Services     RADHA INMAN : Hadii aad ku hadasho Soomaali, waaxda luqadaha, qaybta kaalmada adeegyada, ellis shah . So [facility] [phone].    ATENCIÓN: Si habla español, tiene a zaragoza disposición servicios gratuitos de asistencia lingüística. Llame al [phone].    We comply with applicable federal civil rights laws and [place] laws. We do not discriminate on the basis of race, color, national origin, age, disability, sex, sexual orientation, or gender identity.            Thank " you!     Thank you for choosing [facility]  for your care. Our goal is always to provide you with excellent care. Hearing back from our patients is one way we can continue to improve our services. Please take a few minutes to complete the written survey that you may receive in the mail after your visit with us. Thank you!             Your Updated Medication List - Protect others around you: Learn how to safely use, store and throw away your medicines at www.disposemymeds.org.          This list is accurate as of 2/26/18  4:30 PM.  Always use your most recent med list.                   Brand Name Dispense Instructions for use Diagnosis    buPROPion 300 MG 24 hr tablet    WELLBUTRIN XL    90 tablet    TAKE 1 TAB BY MOUTH EVERY MORNING- Please see psychiatry for further refills.    Recurrent major depressive disorder, remission status unspecified (H), Suicidal ideation, Generalized anxiety disorder, Mood disorder (H)       DAILY MULTIVITAMIN PO      Take 1 tablet by mouth daily.        pantoprazole 20 MG EC tablet    PROTONIX    90 tablet    TAKE 1 TAB BY MOUTH ONCE DAILY 30-60 MINUTES BEFORE A MEAL    Gastroesophageal reflux disease, esophagitis presence not specified       topiramate 50 MG tablet    TOPAMAX     TAKE 1 TAB DAILY FOR 1WK THEN 2 TABS FOR 1WK THEN 3 TABS FOR 1WK THEN 4 TABS AT BEDTIME

## 2018-02-26 NOTE — TELEPHONE ENCOUNTER
Wants to have a MD look at his chart and talk with him about other medication options. He doesn't want to see Martin who won't prescribe and the therapist didn't do anything.  I tried to connect him with scheduling for an appointment with an MD but he hung up before I could transfer the call.  Molly Avery RN-[facility]

## 2018-02-26 NOTE — PATIENT INSTRUCTIONS
Your urine does not look like an infection is present.     You could try the topirimate again. If the symptoms return, then stop it.     I still would like your medications to be managed by psychiatry (medical doctors that prescribe)  Fine to stay with your therapist    Psychiatrists/Psychologists/Therapists:    José Miguel Bowling  [address] [place] [address]  [phone]    [facility]  [address]  [facility] [address] [phone]    [facility]:  [place]   Phone: [phone]   Fax:[phone]     [facility]:  [address] [place] [address]  Ph:   Fax: [phone]    [facility] ([facility]):  Bobby Mckeon [place], [place], [place]  [phone]    [facility]:  [facility]  Phone: [phone]  Fax: [phone]      CRISIS evaluation: nearest ER or 911

## 2018-02-26 NOTE — TELEPHONE ENCOUNTER
"Wants to have a MD look at his chart and talk with him about other medication options. He doesn't want to see Martin who \"won't prescribe\" and the therapist didn't do anything.  I tried to connect him with scheduling for an appointment with an MD but he hung up before I could transfer the call.  Please call the patient to further discuss options. He stated he had a reaction to one medication (no longer listed on MAR). He would like other options.  Molly Avery RN-[facility]  "

## 2018-03-02 DIAGNOSIS — K21.9 GASTROESOPHAGEAL REFLUX DISEASE, ESOPHAGITIS PRESENCE NOT SPECIFIED: ICD-10-CM

## 2018-03-02 RX ORDER — PANTOPRAZOLE SODIUM 20 MG/1
TABLET, DELAYED RELEASE ORAL
Qty: 90 TABLET | Refills: 0 | Status: SHIPPED | OUTPATIENT
Start: 2018-03-02 | End: 2018-12-14

## 2018-03-02 NOTE — TELEPHONE ENCOUNTER
pantoprazole (PROTONIX) 20 MG EC tablet  Prescription approved per [facility] Refill Protocol.    Per LOV 12/07/2017 to follow up in 6 months  Yamel Choi RN, BSN

## 2018-04-02 ENCOUNTER — TELEPHONE (OUTPATIENT)
Dept: FAMILY MEDICINE | Facility: CLINIC | Age: 53
End: 2018-04-02

## 2018-04-02 NOTE — TELEPHONE ENCOUNTER
No, I recommend psychiatry, and he should look for another provider that he is happy with. His mental health is too complex for primary care to manage. If he is out of refills, I can provide a month supply until he is seen.   Sangeetha Salazar

## 2018-04-02 NOTE — TELEPHONE ENCOUNTER
Spoke to pt.  He stated he had seen [facility] and was prescribed topiramaet and wellbutrin.  After starting the topiramate, he experienced headaches and need to urinate in the middle of the night.  Per Bethanie, he stopped the topiramate and did not experience those two side efects.  He then restarted it again to give it another try and the same two side effects occurred.  Bethanie confirmed that head aches are a side effect of topiramate.  1. He is wondering if urinary/kidney activity is a side effect as well.   Also, he will not return to [facility], as he does not like their care.  He is seeing his psychologist (who does not prescribe medication) and is wondering 2. If [facility] will prescribe medication based on information/advisement from his psychologist.

## 2018-04-02 NOTE — TELEPHONE ENCOUNTER
Pt is requesting a call back regarding questions he has about 2 of his medications: Wellbutrin and Topiramate. Please advise.

## 2018-05-02 ENCOUNTER — TELEPHONE (OUTPATIENT)
Dept: FAMILY MEDICINE | Facility: CLINIC | Age: 53
End: 2018-05-02

## 2018-05-02 NOTE — TELEPHONE ENCOUNTER
Pt informed.  He is wondering specifically what tests he could do (imaging or lab) that would help indicate risk of stroke.

## 2018-05-02 NOTE — TELEPHONE ENCOUNTER
Reason for Call:  Other lifeline screening     Detailed comments: he saw a commercial on TV for lifeline screening. They do 5 screening tests for heart attack & stroke risk. He is wondering your thoughts on this.     Phone Number Patient can be reached at: Home number on file [phone] (home)    Best Time: any     Can we leave a detailed message on this number? YES    Call taken on 5/2/2018 at 11:00 AM by Ivy Hammer

## 2018-05-02 NOTE — TELEPHONE ENCOUNTER
They are generally not bad tests, however insurance can sometimes cover some of them, and I cannot attest to the quality of services, so don't direct patients to those services- specifically, and sometimes they are not necessary.   I cannot say for certain, so generally don't offer an opinion. Sangeetha Salazar

## 2018-05-28 ENCOUNTER — NURSE TRIAGE (OUTPATIENT)
Dept: NURSING | Facility: CLINIC | Age: 53
End: 2018-05-28

## 2018-05-28 NOTE — TELEPHONE ENCOUNTER
"\"I think that I have deer ticks on me\".  Caller is reporting that he just found one on him and he removed it, however there are two other spots that he believes the ticks to be embedded into him and the area is swollen up.      He is not available to be seen until Thursday if he doesn't go in today.  He believes that the ticks could have been attached for a week or more.    Caller is asking for closest facility to [facility].  Advised that [facility] does not have any open  today in his area.  Caller will go into [facility]/ in [place], [place] now.    Delaney Howell RN  [facility]      "

## 2018-05-29 ENCOUNTER — OFFICE VISIT (OUTPATIENT)
Dept: FAMILY MEDICINE | Facility: CLINIC | Age: 53
End: 2018-05-29
Payer: COMMERCIAL

## 2018-05-29 VITALS
DIASTOLIC BLOOD PRESSURE: 80 MMHG | WEIGHT: 280 LBS | HEART RATE: 76 BPM | RESPIRATION RATE: 16 BRPM | TEMPERATURE: 98.4 F | BODY MASS INDEX: 37.45 KG/M2 | SYSTOLIC BLOOD PRESSURE: 124 MMHG
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
## 2018-05-29 DIAGNOSIS — S30.861A TICK BITE OF ABDOMEN, INITIAL ENCOUNTER: Primary | ICD-10-CM

## 2018-05-29 PROCEDURE — 86618 LYME DISEASE ANTIBODY: CPT | Performed by: NURSE PRACTITIONER

## 2018-05-29 PROCEDURE — 99213 OFFICE O/P EST LOW 20 MIN: CPT | Performed by: NURSE PRACTITIONER

## 2018-05-29 PROCEDURE — 36415 COLL VENOUS BLD VENIPUNCTURE: CPT | Performed by: NURSE PRACTITIONER

## 2018-05-29 ASSESSMENT — PAIN SCALES - GENERAL: PAINLEVEL: NO PAIN (0)

## 2018-05-29 NOTE — MR AVS SNAPSHOT
After Visit Summary   5/29/2018    Trung Dubois    MRN: [id number]           Patient Information     Date Of Birth          1965        Visit Information        Provider Department      5/29/2018 1:20 PM Sangeetha Salazar APRN CNP [facility]        Today's Diagnoses     Tick bite of abdomen, initial encounter    -  1       Follow-ups after your visit        Future tests that were ordered for you today     Open Future Orders        Priority Expected Expires Ordered    **Lyme Disease Argelia with reflex to WB Serum FUTURE 14d Routine 6/5/2018 6/12/2018 5/29/2018            Who to contact     If you have questions or need follow up information about today's clinic visit or your schedule please contact [facility] directly at [phone].  Normal or non-critical lab and imaging results will be communicated to you by Sinequahart, letter or phone within 4 business days after the clinic has received the results. If you do not hear from us within 7 days, please contact the clinic through Sinequahart or phone. If you have a critical or abnormal lab result, we will notify you by phone as soon as possible.  Submit refill requests through DigitalTangible or call your pharmacy and they will forward the refill request to us. Please allow 3 business days for your refill to be completed.          Additional Information About Your Visit        MyChart Information     DigitalTangible gives you secure access to your electronic health record. If you see a primary care provider, you can also send messages to your care team and make appointments. If you have questions, please call your primary care clinic.  If you do not have a primary care provider, please call [phone] and they will assist you.        Care EveryWhere ID     This is your Care EveryWhere ID. This could be used by other organizations to access your [facility] medical records  [id number]        Your Vitals Were     Pulse Temperature Respirations BMI  (Body Mass Index)          76 98.4  F (36.9  C) (Temporal) 16 37.45 kg/m2         Blood Pressure from Last 3 Encounters:   05/29/18 124/80   02/26/18 138/78   12/07/17 120/80    Weight from Last 3 Encounters:   05/29/18 280 lb (127 kg)   02/26/18 275 lb 1.6 oz (124.8 kg)   12/07/17 274 lb (124.3 kg)              We Performed the Following     Lyme Disease Argelia with reflex to WB Serum        Primary Care Provider Office Phone # Fax #    Sangeetha DAWOOD Ortiz [facility] [phone] [address] [address]        Equal Access to Services     RADHA INMAN : Hadii lorena cheneyo Ortiz, waaxda luqadaha, qaybta kaalmada adeegyada, ellis shah . So [facility] [phone].    ATENCIÓN: Si habla español, tiene a zaragoza disposición servicios gratuitos de asistencia lingüística. Llame al [phone].    We comply with applicable federal civil rights laws and [place] laws. We do not discriminate on the basis of race, color, national origin, age, disability, sex, sexual orientation, or gender identity.            Thank you!     Thank you for choosing [facility]  for your care. Our goal is always to provide you with excellent care. Hearing back from our patients is one way we can continue to improve our services. Please take a few minutes to complete the written survey that you may receive in the mail after your visit with us. Thank you!             Your Updated Medication List - Protect others around you: Learn how to safely use, store and throw away your medicines at www.disposemymeds.org.          This list is accurate as of 5/29/18 11:59 PM.  Always use your most recent med list.                   Brand Name Dispense Instructions for use Diagnosis    buPROPion 300 MG 24 hr tablet    WELLBUTRIN XL    90 tablet    TAKE 1 TAB BY MOUTH EVERY MORNING- Please see psychiatry for further refills.    Recurrent major depressive disorder, remission status unspecified (H),  Suicidal ideation, Generalized anxiety disorder, Mood disorder (H)       DAILY MULTIVITAMIN PO      Take 1 tablet by mouth daily.        pantoprazole 20 MG EC tablet    PROTONIX    90 tablet    TAKE 1 TAB BY MOUTH ONCE DAILY 30-60 MINUTES BEFORE A MEAL    Gastroesophageal reflux disease, esophagitis presence not specified       topiramate 50 MG tablet    TOPAMAX     TAKE 1 TAB DAILY FOR 1WK THEN 2 TABS FOR 1WK THEN 3 TABS FOR 1WK THEN 4 TABS AT BEDTIME

## 2018-05-29 NOTE — PROGRESS NOTES
SUBJECTIVE:   Trung Dubois is a 53 year old male who presents to clinic today for the following health issues:      HPI  Concern - couple weeks and saw on dear tick yesterday   Onset: couple weeks     Description:   Patient states that possible tick bite on left shoulder and right low back. The one located on the low right back is red, swelling, intermittent itching. Patient states he been picking on the areas    Intensity: 0/10    Progression of Symptoms:  worsening    Accompanying Signs & Symptoms:  None     Previous history of similar problem:   Wood tick but not dear tick     Precipitating factors:   Worsened by: none     Alleviating factors:  Improved by: none     Therapies Tried and outcome: none   Problem list and histories reviewed & adjusted, as indicated.  Additional history: as documented    He says that his wife noticed a spot on his right hip on Saturday night and was concerned it was a deer tick bite. He has seen a deer tick on him just crawling, and he got it off. He gets them from his backyard.         Patient Active Problem List   Diagnosis     CARDIOVASCULAR SCREENING; LDL GOAL LESS THAN 160     GERD (gastroesophageal reflux disease)     Mood disorder (H)     Suicidal ideation     Major depression     Generalized anxiety disorder     BMI over 36     FAUZIA (obstructive sleep apnea)     Past Surgical History:   Procedure Laterality Date     LASIK      left eye only     TONSILLECTOMY      AGE 5       Social History   Substance Use Topics     Smoking status: Never Smoker     Smokeless tobacco: Never Used     Alcohol use Yes      Comment: Light to moderate     Family History   Problem Relation Age of Onset     DIABETES Mother      Leukemia Mother      Psychotic Disorder Father      Depression/suicide     Family History Negative Other      Depression Brother      Suicide/Bipolar     Asthma No family hx of      C.A.D. No family hx of      Hypertension No family hx of      CEREBROVASCULAR DISEASE No  family hx of      Breast Cancer No family hx of      Cancer - colorectal No family hx of      Prostate Cancer No family hx of      Alcohol/Drug No family hx of      Allergies No family hx of      Alzheimer Disease No family hx of      Anesthesia Reaction No family hx of      Blood Disease No family hx of      Arthritis No family hx of      CANCER No family hx of      Cardiovascular No family hx of      Circulatory No family hx of      Congenital Anomalies No family hx of      Connective Tissue Disorder No family hx of          Current Outpatient Prescriptions   Medication Sig Dispense Refill     Multiple Vitamin (DAILY MULTIVITAMIN PO) Take 1 tablet by mouth daily.       pantoprazole (PROTONIX) 20 MG EC tablet TAKE 1 TAB BY MOUTH ONCE DAILY 30-60 MINUTES BEFORE A MEAL 90 tablet 0     buPROPion (WELLBUTRIN XL) 300 MG 24 hr tablet TAKE 1 TAB BY MOUTH EVERY MORNING- Please see psychiatry for further refills. (Patient not taking: Reported on 5/29/2018) 90 tablet 0     topiramate (TOPAMAX) 50 MG tablet TAKE 1 TAB DAILY FOR 1WK THEN 2 TABS FOR 1WK THEN 3 TABS FOR 1WK THEN 4 TABS AT BEDTIME  1     Allergies   Allergen Reactions     Prevacid [Lansoprazole] Nausea     Labs reviewed in EPIC    ROS:  Constitutional, neuro, ENT, endocrine, pulmonary, cardiac, gastrointestinal, genitourinary, musculoskeletal, integument and psychiatric systems are negative, except as otherwise noted.  Pt. Declines to discuss mental health questionnaires.     This document serves as a record of the services and decisions personally performed by DANILO CERVANTES. It was created on his/her behalf by Salma Tsai, a trained medical scribe. The creation of this document is based on the provider's statements to the medical scribe. Salma Tsai, May 29, 2018 1:49 PM    OBJECTIVE:                                                    /80  Pulse 76  Temp 98.4  F (36.9  C) (Temporal)  Resp 16  Wt 280 lb (127 kg)  BMI 37.45 kg/m2  Body mass index is 37.45  kg/(m^2).  GENERAL APPEARANCE: healthy, alert and no distress  HENT: ear canals and TM's normal and nose and mouth without ulcers or lesions  NECK: no adenopathy, no asymmetry, masses, or scars   RESP: lungs clear to auscultation - no rales, rhonchi or wheezes  CV: regular rates and rhythm, normal S1 S2, no S3 or S4 and no murmur, click or rub  SKIN: 2 cm histamine wheel on the right lateral abdomen. Raised papular area skin colored with a slight scab, no drainage, on left posterior shoulder.   NEURO: Normal strength and tone, mentation intact and speech normal  PSYCH: mentation appears normal and affect normal/bright    Diagnostic Test Results:  No results found for this or any previous visit (from the past 24 hour(s)).     ASSESSMENT/PLAN:                                                        ICD-10-CM    1. Tick bite of abdomen, initial encounter S30.861A **Lyme Disease Argelia with reflex to WB Serum FUTURE 14d    W57.XXXA Lyme Disease Argelia with reflex to WB Serum       Lyme's labs today. If negative today, will recheck in 2 weeks.     There are no Patient Instructions on file for this visit.    DAWOOD Cody [facility] [place]    The information in this document, created by the medical scribe Salma Tsai for me, accurately reflects the services I personally performed and the decisions made by me. I have reviewed and approved this document for accuracy prior to leaving the patient care area.

## 2018-05-30 LAB — B BURGDOR IGG+IGM SER QL: 0.08 (ref 0–0.89)

## 2018-06-04 ENCOUNTER — TELEPHONE (OUTPATIENT)
Dept: FAMILY MEDICINE | Facility: CLINIC | Age: 53
End: 2018-06-04
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
## 2018-06-12 DIAGNOSIS — S30.861A TICK BITE OF ABDOMEN, INITIAL ENCOUNTER: ICD-10-CM

## 2018-06-12 PROCEDURE — 36415 COLL VENOUS BLD VENIPUNCTURE: CPT | Performed by: NURSE PRACTITIONER

## 2018-06-12 PROCEDURE — 86618 LYME DISEASE ANTIBODY: CPT | Performed by: NURSE PRACTITIONER

## 2018-06-13 LAB — B BURGDOR IGG+IGM SER QL: 0.08 (ref 0–0.89)

## 2018-06-22 ENCOUNTER — TELEPHONE (OUTPATIENT)
Dept: SLEEP MEDICINE | Facility: CLINIC | Age: 53
End: 2018-06-22

## 2018-06-22 DIAGNOSIS — E66.01 MORBID OBESITY (H): ICD-10-CM

## 2018-06-22 DIAGNOSIS — G47.33 OSA (OBSTRUCTIVE SLEEP APNEA): ICD-10-CM

## 2018-06-22 NOTE — TELEPHONE ENCOUNTER
Patient has decided he would like a cpap. Please put in an order and he wants [facility] to set him up. If any questions, patient can be reached at [phone]

## 2018-06-26 NOTE — TELEPHONE ENCOUNTER
Ordered CPAP and LVM to inform patient to call back to schedule DME appt.  Souleymane Gordon MD, Sleep Physician  6:54 PM, 6/26/2018

## 2018-06-29 ENCOUNTER — OFFICE VISIT (OUTPATIENT)
Dept: FAMILY MEDICINE | Facility: CLINIC | Age: 53
End: 2018-06-29
Payer: COMMERCIAL

## 2018-06-29 VITALS
WEIGHT: 285 LBS | SYSTOLIC BLOOD PRESSURE: 130 MMHG | BODY MASS INDEX: 38.12 KG/M2 | HEART RATE: 100 BPM | TEMPERATURE: 98.4 F | DIASTOLIC BLOOD PRESSURE: 76 MMHG | RESPIRATION RATE: 16 BRPM

## 2018-06-29 DIAGNOSIS — L23.7 CONTACT DERMATITIS DUE TO POISON IVY: Primary | ICD-10-CM

## 2018-06-29 DIAGNOSIS — F33.2 SEVERE EPISODE OF RECURRENT MAJOR DEPRESSIVE DISORDER, WITHOUT PSYCHOTIC FEATURES (H): ICD-10-CM

## 2018-06-29 PROCEDURE — 99213 OFFICE O/P EST LOW 20 MIN: CPT | Performed by: NURSE PRACTITIONER

## 2018-06-29 RX ORDER — TRIAMCINOLONE ACETONIDE 5 MG/G
CREAM TOPICAL
Qty: 30 G | Refills: 0 | Status: SHIPPED | OUTPATIENT
Start: 2018-06-29 | End: 2018-07-03

## 2018-06-29 ASSESSMENT — PAIN SCALES - GENERAL: PAINLEVEL: MILD PAIN (3)

## 2018-06-29 NOTE — MR AVS SNAPSHOT
After Visit Summary   6/29/2018    Trung Dubois    MRN: [id number]           Patient Information     Date Of Birth          1965        Visit Information        Provider Department      6/29/2018 1:00 PM Sangeetha Salazar APRN CNP [facility] [place]        Today's Diagnoses     Contact dermatitis due to poison ivy    -  1    Severe episode of recurrent major depressive disorder, without psychotic features (H)           Follow-ups after your visit        Your next 10 appointments already scheduled     Jul 11, 2018  1:30 PM CDT   PAP SETUP with ANNE HINSON   [facility] ([facility])    [address] [address] [address] [phone]              Who to contact     If you have questions or need follow up information about today's clinic visit or your schedule please contact [facility] [place] directly at [phone].  Normal or non-critical lab and imaging results will be communicated to you by MyChart, letter or phone within 4 business days after the clinic has received the results. If you do not hear from us within 7 days, please contact the clinic through [employer] or phone. If you have a critical or abnormal lab result, we will notify you by phone as soon as possible.  Submit refill requests through [employer] or call your pharmacy and they will forward the refill request to us. Please allow 3 business days for your refill to be completed.          Additional Information About Your Visit        MyChart Information     [employer] gives you secure access to your electronic health record. If you see a primary care provider, you can also send messages to your care team and make appointments. If you have questions, please call your primary care clinic.  If you do not have a primary care provider, please call [phone] and they will assist you.        Care EveryWhere ID     This is your Care EveryWhere ID. This could be used  by other organizations to access your [facility] medical records  [id number]        Your Vitals Were     Pulse Temperature Respirations BMI (Body Mass Index)          100 98.4  F (36.9  C) (Temporal) 16 38.12 kg/m2         Blood Pressure from Last 3 Encounters:   06/29/18 130/76   05/29/18 124/80   02/26/18 138/78    Weight from Last 3 Encounters:   06/29/18 285 lb (129.3 kg)   05/29/18 280 lb (127 kg)   02/26/18 275 lb 1.6 oz (124.8 kg)              Today, you had the following     No orders found for display         Today's Medication Changes          These changes are accurate as of 6/29/18  1:55 PM.  If you have any questions, ask your nurse or doctor.               Start taking these medicines.        Dose/Directions    triamcinolone 0.5 % cream   Commonly known as:  KENALOG   Used for:  Contact dermatitis due to poison ivy   Started by:  Sangeetha Salazar APRN CNP        Apply sparingly to affected area three times daily.   Quantity:  30 g   Refills:  0         Stop taking these medicines if you haven't already. Please contact your care team if you have questions.     buPROPion 300 MG 24 hr tablet   Commonly known as:  WELLBUTRIN XL   Stopped by:  Sangeetha Salazar APRN CNP                Where to get your medicines      These medications were sent to [place] [address] [facility] - [place], [place] - [address]  [address] [address] [facility], [facility] [address]     Phone:  [phone]     triamcinolone 0.5 % cream                Primary Care Provider Office Phone # Fax #    DAWOOD Altamirano [phone] [address] [facility] [address]        Equal Access to Services     TEA INMAN : Hadii lorena avendaño hadashnivia Soomaali, waaxda luqadaha, qaybta kaalmada omkaryajt, ellis armstrong. So [facility] [phone].    ATENCIÓN: Si habla español, tiene a zaragoza disposición servicios gratuitos de asistencia lingüística. Llame al [phone].    We comply with applicable federal civil rights  laws and [place] laws. We do not discriminate on the basis of race, color, national origin, age, disability, sex, sexual orientation, or gender identity.            Thank you!     Thank you for choosing [facility]  for your care. Our goal is always to provide you with excellent care. Hearing back from our patients is one way we can continue to improve our services. Please take a few minutes to complete the written survey that you may receive in the mail after your visit with us. Thank you!             Your Updated Medication List - Protect others around you: Learn how to safely use, store and throw away your medicines at www.disposemymeds.org.          This list is accurate as of 6/29/18  1:55 PM.  Always use your most recent med list.                   Brand Name Dispense Instructions for use Diagnosis    DAILY MULTIVITAMIN PO      Take 1 tablet by mouth daily.        pantoprazole 20 MG EC tablet    PROTONIX    90 tablet    TAKE 1 TAB BY MOUTH ONCE DAILY 30-60 MINUTES BEFORE A MEAL    Gastroesophageal reflux disease, esophagitis presence not specified       topiramate 50 MG tablet    TOPAMAX     TAKE 1 TAB DAILY FOR 1WK THEN 2 TABS FOR 1WK THEN 3 TABS FOR 1WK THEN 4 TABS AT BEDTIME        triamcinolone 0.5 % cream    KENALOG    30 g    Apply sparingly to affected area three times daily.    Contact dermatitis due to poison ivy

## 2018-06-29 NOTE — PROGRESS NOTES
"  SUBJECTIVE:   Trung Dubois is a 53 year old male who presents to clinic today for the following health issues:      HPI  Rash  Onset: 5 days, possible due of poison ivy     Description:   Location: \"itching all over\" blister on the bilateral arms   Character: raised, red, blister   Itching (Pruritis): YES    Progression of Symptoms:  worsening    Accompanying Signs & Symptoms:  Fever: no   Body aches or joint pain: no   Sore throat symptoms: no   Recent cold symptoms: no     History:   Previous similar rash: YES- \"when I was a kid\"     Precipitating factors:   Exposure to similar rash: YES  New exposures: patient was out in the woods    Recent travel: no     Alleviating factors:  None     Therapies Tried and outcome: none   Problem list and histories reviewed & adjusted, as indicated.  Additional history: as documented    Patient reports that he is \"terribly\" itchy on both his arms. He states that he has not been on steroids or antibiotics in the past, but he has had a similar rash when he was a kid. He believes that he was exposed to poison ivy when he was fixing the underside of a car last week.     He states that he feels his mood is stable but his therapist doesn't agree with him. He states that he does not want to go back on medication as he can't drink then.    Patient Active Problem List   Diagnosis     CARDIOVASCULAR SCREENING; LDL GOAL LESS THAN 160     GERD (gastroesophageal reflux disease)     Mood disorder (H)     Suicidal ideation     Major depression     Generalized anxiety disorder     BMI over 36     FAUZIA (obstructive sleep apnea)     Past Surgical History:   Procedure Laterality Date     LASIK      left eye only     TONSILLECTOMY      AGE 5       Social History   Substance Use Topics     Smoking status: Never Smoker     Smokeless tobacco: Never Used     Alcohol use Yes      Comment: Light to moderate     Family History   Problem Relation Age of Onset     Diabetes Mother      Leukemia Mother  "     Psychotic Disorder Father      Depression/suicide     Family History Negative Other      Depression Brother      Suicide/Bipolar     Asthma No family hx of      C.A.D. No family hx of      Hypertension No family hx of      Cerebrovascular Disease No family hx of      Breast Cancer No family hx of      Cancer - colorectal No family hx of      Prostate Cancer No family hx of      Alcohol/Drug No family hx of      Allergies No family hx of      Alzheimer Disease No family hx of      Anesthesia Reaction No family hx of      Blood Disease No family hx of      Arthritis No family hx of      Cancer No family hx of      Cardiovascular No family hx of      Circulatory No family hx of      Congenital Anomalies No family hx of      Connective Tissue Disorder No family hx of          Current Outpatient Prescriptions   Medication Sig Dispense Refill     Multiple Vitamin (DAILY MULTIVITAMIN PO) Take 1 tablet by mouth daily.       pantoprazole (PROTONIX) 20 MG EC tablet TAKE 1 TAB BY MOUTH ONCE DAILY 30-60 MINUTES BEFORE A MEAL 90 tablet 0     triamcinolone (KENALOG) 0.5 % cream Apply sparingly to affected area three times daily. 30 g 0     topiramate (TOPAMAX) 50 MG tablet TAKE 1 TAB DAILY FOR 1WK THEN 2 TABS FOR 1WK THEN 3 TABS FOR 1WK THEN 4 TABS AT BEDTIME  1     Allergies   Allergen Reactions     Prevacid [Lansoprazole] Nausea     Recent Labs   Lab Test  12/07/17   1531  07/17/16   1205  06/29/15   1756  07/19/14   0808   LDL  105*  122*  123  106   HDL  41  41   --   31*   TRIG  226*  98   --   175*   ALT   --    --   28  37   CR   --   0.98  1.03  1.06   GFRESTIMATED   --   81  77  75   GFRESTBLACK   --   >90   GFR Calc    >90   GFR Calc    >90   POTASSIUM   --   4.2  4.0  4.2   TSH   --    --    --   1.35      BP Readings from Last 3 Encounters:   06/29/18 130/76   05/29/18 124/80   02/26/18 138/78    Wt Readings from Last 3 Encounters:   06/29/18 285 lb (129.3 kg)   05/29/18 280 lb (127  kg)   02/26/18 275 lb 1.6 oz (124.8 kg)                    ROS:  Constitutional, HEENT, cardiovascular, pulmonary, GI, , musculoskeletal, neuro, skin, endocrine and psych systems are negative, except as otherwise noted.    This document serves as a record of the services and decisions personally performed and made by Sangeetha Salazar CNP. It was created on his/her behalf by Sully Chandler, trained medical scribe. The creation of this document is based the provider's statements to the medical scribes.    Scribtiny Chandler 1:09 PM, June 29, 2018  OBJECTIVE:     /76  Pulse 100  Temp 98.4  F (36.9  C) (Temporal)  Resp 16  Wt 285 lb (129.3 kg)  BMI 38.12 kg/m2  Body mass index is 38.12 kg/(m^2).     GENERAL: healthy, alert and no distress  SKIN: macular, papular, clear, vesicular, raised patch on L upper arm approximately 15 cm in diameter, mildly erythematic, no open lesions, excoriation tracts with similar presentation distally. On right upper arm a 3mm punctate region noted  NEURO: Normal strength and tone, mentation intact and speech normal  PSYCH: mentation appears normal, affect normal/bright    Diagnostic Test Results:  No results found for this or any previous visit (from the past 24 hour(s)).    ASSESSMENT/PLAN:       ICD-10-CM    1. Contact dermatitis due to poison ivy L23.7 triamcinolone (KENALOG) 0.5 % cream   2. Severe episode of recurrent major depressive disorder, without psychotic features (H) F33.2      Reviewed symptoms and etiology patient presents today. Advised starting kenalog 0.5% cream for his contact dermatitis due to poison ivy.     Discussed starting medication for his depression and ENID, but patient was not interested a this time, he feels his mood is stable. Followed by therapist.    Follow up with PCP if symptoms do not improve or worsen or PRN      DAWOOD Cody CNP  [facility] [place]    The information in this document, created by the medical scribe for me,  accurately reflects the services I personally performed and the decisions made by me. I have reviewed and approved this document for accuracy prior to leaving the patient care area.  Sangeetha Salazar CNP  1:09 PM, 06/29/18

## 2018-07-03 ENCOUNTER — TELEPHONE (OUTPATIENT)
Dept: FAMILY MEDICINE | Facility: CLINIC | Age: 53
End: 2018-07-03

## 2018-07-03 DIAGNOSIS — L23.7 CONTACT DERMATITIS DUE TO POISON IVY: ICD-10-CM

## 2018-07-03 RX ORDER — TRIAMCINOLONE ACETONIDE 5 MG/G
CREAM TOPICAL
Qty: 30 G | Refills: 0 | Status: SHIPPED | OUTPATIENT
Start: 2018-07-03 | End: 2018-11-05

## 2018-07-03 NOTE — TELEPHONE ENCOUNTER
Poison Ivy spreading down both arms. Patient is requesting  a refill on a Kenalog cream.  Patient was seen on 6/29/2018.       Pharmacy - [facility]

## 2018-07-11 ENCOUNTER — DOCUMENTATION ONLY (OUTPATIENT)
Dept: SLEEP MEDICINE | Facility: CLINIC | Age: 53
End: 2018-07-11
Payer: COMMERCIAL

## 2018-07-11 DIAGNOSIS — G47.33 OSA (OBSTRUCTIVE SLEEP APNEA): ICD-10-CM

## 2018-07-11 DIAGNOSIS — E66.01 MORBID OBESITY (H): ICD-10-CM

## 2018-07-11 NOTE — PROGRESS NOTES
Patient was offered choice of vendor and chose [facility].  Trung Dubois was set up at [place] on July 11, 2018 Patient received a [employer] DreamStation Auto. Pressures were set at 5-15 cm H2O.   Patient s ramp is 5 cm H2O for 30 min and FLEX/EPR is C Flex, 2.  Patient received a [employer] Mask name: Dreamwear Nasal mask Size Medium, heated tubing and heated humidifier.  Patient is enrolled in the STM Program and does need to meet compliance. Patient has a follow up on TBD (patient to schedule) with Dr. Gordon.    Patient is going to rent for to months, if patient decides to keep the machine, it will convert to purchase on the third month.    STM Note: Patient is not going to start using CPAP until this weekend, so wait on the first call until the middle of next week, on or around 07/18/18.    Alessandra Bradshaw

## 2018-07-16 ENCOUNTER — DOCUMENTATION ONLY (OUTPATIENT)
Dept: SLEEP MEDICINE | Facility: CLINIC | Age: 53
End: 2018-07-16

## 2018-07-16 DIAGNOSIS — G47.33 OSA (OBSTRUCTIVE SLEEP APNEA): ICD-10-CM

## 2018-07-16 DIAGNOSIS — E66.01 MORBID OBESITY (H): ICD-10-CM

## 2018-07-16 NOTE — PROGRESS NOTES
3 DAY STM VISIT    Patient contacted for 3 day STM visit  Subjective measures:  Pt states things are going ok. He switched sizes of his nasal pillow mask because it's leaking and was making a lot of noise which bothers his wife.  He hasn't noticed much difference in how he feels yet.     Device type: Auto-CPAP  PAP settings from order::  CPAP min 5 cm  H20       CPAP max 15 cm  H20  Mask type:    Nasal Mask     Device settings from machine      Min CPAP 5            Max CPAP 15      Assessment: Nightly usage, most nights over four hours   Action plan: Pt to have f/u 14 day STM visit.  Patient has a follow up visit scheduled:   yes within 31-90 days of set up.

## 2018-07-19 ENCOUNTER — TELEPHONE (OUTPATIENT)
Dept: SLEEP MEDICINE | Facility: CLINIC | Age: 53
End: 2018-07-19

## 2018-07-19 DIAGNOSIS — G47.33 OSA (OBSTRUCTIVE SLEEP APNEA): ICD-10-CM

## 2018-07-19 DIAGNOSIS — E66.01 MORBID OBESITY (H): ICD-10-CM

## 2018-07-19 NOTE — TELEPHONE ENCOUNTER
Patient called today 07/19/18 regarding dry nose and water is empty when he wakes up. I let him know water is empty is because he is leaking. Patient has Dreamwear Nasal mask. I asked that he schedule a mask fitting appointment and we can swap him out to a different mask since the Dreamwear nasal is leaking. Patient said he cannot wear any other mask and this is the only mask. Per patient he was fine until he got the pap machine and he did not want it but people told him to get it and it will help him. 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 Patient said thank you and he will see if he calls to schedule the appointment.

## 2018-07-25 ENCOUNTER — DOCUMENTATION ONLY (OUTPATIENT)
Dept: SLEEP MEDICINE | Facility: CLINIC | Age: 53
End: 2018-07-25
Payer: COMMERCIAL

## 2018-07-25 DIAGNOSIS — E66.01 MORBID OBESITY (H): ICD-10-CM

## 2018-07-25 DIAGNOSIS — G47.33 OSA (OBSTRUCTIVE SLEEP APNEA): ICD-10-CM

## 2018-07-25 NOTE — PROGRESS NOTES
Patient came back into [facility] to try another mask.  Patient chose the [email or web address] Dreamwear Gel Pillow mask, size Medium.  Patient said he has not been able to sleep well with the CPAP, but is willing to keep trying.

## 2018-07-26 ENCOUNTER — DOCUMENTATION ONLY (OUTPATIENT)
Dept: SLEEP MEDICINE | Facility: CLINIC | Age: 53
End: 2018-07-26

## 2018-07-26 DIAGNOSIS — G47.33 OSA (OBSTRUCTIVE SLEEP APNEA): ICD-10-CM

## 2018-07-26 DIAGNOSIS — E66.01 MORBID OBESITY (H): ICD-10-CM

## 2018-07-26 NOTE — PROGRESS NOTES
14 DAY STM VISIT    Diagnostic AHI:     5.7 events per hour    Subjective measures:   Pt is having issues with his current mask.  He tried the pillow mask but did not like it.  He is back to his original mask and is going to keep working with this mask.  He is not yet feeling benefit from therapy.     Assessment: Pt not meeting objective benchmarks for compliance and elevated AHI  Patient failing following subjective benchmarks: leak issues , not feeling benefit from therapy and mask discomfort  (soreness)  Action plan: pt to have 30 day STM visit.   Device type: Auto-CPAP  PAP settings: CPAP min 5 cm  H20     CPAP max 15 cm  H20     90th % pressure8.4 cm  H20    Mask type:   Nasal Mask     Objective measures: 14 day rolling measures         Compliance  14 %      % of night spent in large leak  0 % last  upload      AHI 6.82   last  upload      Average number of minutes 80     Average hours of usage 1.3              Objective measure goal  Compliance   Goal >70%  Leak   Goal < 10%  AHI  Goal < 5  Usage Goal >240

## 2018-08-13 ENCOUNTER — DOCUMENTATION ONLY (OUTPATIENT)
Dept: SLEEP MEDICINE | Facility: CLINIC | Age: 53
End: 2018-08-13

## 2018-08-13 DIAGNOSIS — G47.33 OSA (OBSTRUCTIVE SLEEP APNEA): ICD-10-CM

## 2018-08-13 DIAGNOSIS — E66.01 MORBID OBESITY (H): ICD-10-CM

## 2018-08-13 NOTE — PROGRESS NOTES
30 DAY [facility] VISIT    Diagnostic AHI:    5.7 events per hourwatch PAT      Subjective measures:  Pt had one full night using the device and he felt that it went really well, however the next night after he washed the mask and tubing he felt that it had a strange smell and it made him instantly congested.  He is unsure what the smell was.  He will be washing it again and see if this improves the smell.      Assessment: Pt not meeting objective benchmarks for compliance .  Previous complains on mask.  Action plan:   Patient has a follow up visit with Dr. Gordon on 8/29/2018  Device type: Auto-CPAP  PAP settings: CPAP min 5 cm  H20     CPAP max 15 cm  H20     90th % pressure6.7 cm  H20    Mask type:  Nasal Mask    Objective measures: 14 day rolling measures         Compliance  7 %     % of night spent in large leak  0 % last  upload      AHI 5.69   last  Upload-low usage times on most nights causing an elevated AHI.       Average number of minutes 92          Objective measure goal  Compliance   Goal >70%  Leak   Goal < 10%  AHI  Goal < 5  Usage  Goal >240

## 2018-11-02 NOTE — PROGRESS NOTES
SUBJECTIVE:   Trung Dubois is a 53 year old male who presents to clinic today for the following health issues:    HPI  Foot Problem    Onset: One month    Description:   Location: Right foot   Character: Numbing    Intensity: mild    Progression of Symptoms: same    Accompanying Signs & Symptoms:  Other symptoms: numbness and tingling    History:   Previous similar pain: no       Precipitating factors:   Trauma or overuse: no     Alleviating factors:  Improved by: nothing  Therapies Tried and outcome: None    He states that is feels as if he has received a shot of novocain in one spot on the top of his right foot. He does wear steel toe boots at work. He can feel the pressure applied, but not normal touch sensation. He has tried 3 different boots in the last few months and has arch support in his shoes.       Problem list and histories reviewed & adjusted, as indicated.  Additional history: as documented    Patient notes improvement in mood and that he is more relaxed. He does note with his medication he seems to have increase in urination frequency. He is actively working towards weight loss.     Patient Active Problem List   Diagnosis     CARDIOVASCULAR SCREENING; LDL GOAL LESS THAN 160     GERD (gastroesophageal reflux disease)     Mood disorder (H)     Suicidal ideation     Major depression     Generalized anxiety disorder     BMI over 36     FAUZIA (obstructive sleep apnea)     Past Surgical History:   Procedure Laterality Date     LASIK      left eye only     TONSILLECTOMY      AGE 5       Social History   Substance Use Topics     Smoking status: Never Smoker     Smokeless tobacco: Never Used     Alcohol use Yes      Comment: Light to moderate     Family History   Problem Relation Age of Onset     Diabetes Mother      Leukemia Mother      Psychotic Disorder Father      Depression/suicide     Family History Negative Other      Depression Brother      Suicide/Bipolar     Asthma No family hx of      C.A.D. No  family hx of      Hypertension No family hx of      Cerebrovascular Disease No family hx of      Breast Cancer No family hx of      Cancer - colorectal No family hx of      Prostate Cancer No family hx of      Alcohol/Drug No family hx of      Allergies No family hx of      Alzheimer Disease No family hx of      Anesthesia Reaction No family hx of      Blood Disease No family hx of      Arthritis No family hx of      Cancer No family hx of      Cardiovascular No family hx of      Circulatory No family hx of      Congenital Anomalies No family hx of      Connective Tissue Disorder No family hx of          Current Outpatient Prescriptions   Medication Sig Dispense Refill     pantoprazole (PROTONIX) 20 MG EC tablet TAKE 1 TAB BY MOUTH ONCE DAILY 30-60 MINUTES BEFORE A MEAL 90 tablet 0     topiramate (TOPAMAX) 50 MG tablet TAKE 1 TAB DAILY FOR 1WK THEN 2 TABS FOR 1WK THEN 3 TABS FOR 1WK THEN 4 TABS AT BEDTIME  1     Multiple Vitamin (DAILY MULTIVITAMIN PO) Take 1 tablet by mouth daily.       Allergies   Allergen Reactions     Prevacid [Lansoprazole] Nausea     Recent Labs   Lab Test  12/07/17   1531  07/17/16   1205  06/29/15   1756  07/19/14   0808   LDL  105*  122*  123  106   HDL  41  41   --   31*   TRIG  226*  98   --   175*   ALT   --    --   28  37   CR   --   0.98  1.03  1.06   GFRESTIMATED   --   81  77  75   GFRESTBLACK   --   >90   GFR Calc    >90   GFR Calc    >90   POTASSIUM   --   4.2  4.0  4.2   TSH   --    --    --   1.35      BP Readings from Last 3 Encounters:   11/05/18 110/84   06/29/18 130/76   05/29/18 124/80    Wt Readings from Last 3 Encounters:   11/05/18 282 lb 11.2 oz (128.2 kg)   06/29/18 285 lb (129.3 kg)   05/29/18 280 lb (127 kg)        ROS:  Constitutional, HEENT, cardiovascular, pulmonary, GI, , musculoskeletal, neuro, skin, endocrine and psych systems are negative, except as otherwise noted.    This document serves as a record of the services and  "decisions personally performed and made by Sangeetha Salazar CNP. It was created on his/her behalf by Sully Chandler, trained medical scribe. The creation of this document is based the provider's statements to the medical scribes.    Fátima Chandler 3:15 PM, November 5, 2018  OBJECTIVE:     /84  Pulse 74  Temp 98  F (36.7  C) (Temporal)  Resp 18  Ht 6' 0.5\" (1.842 m)  Wt 282 lb 11.2 oz (128.2 kg)  SpO2 97%  BMI 37.81 kg/m2  Body mass index is 37.81 kg/(m^2).     GENERAL: healthy, alert and no distress  MS: no gross musculoskeletal defects noted, no edema  SKIN: no suspicious lesions or rashes  NEURO: Normal strength and tone, mentation intact and speech normal, decreased sensation in a 4cm area just proximal to great MTP joint  PSYCH: mentation appears normal, affect normal/bright    Diagnostic Test Results:  No results found for this or any previous visit (from the past 24 hour(s)).    ASSESSMENT/PLAN:       ICD-10-CM    1. Altered sensation of foot R20.9    2. Morbid obesity (H) E66.01    3. Screen for colon cancer Z12.11    4. Screening for HIV (human immunodeficiency virus) Z11.4      Reviewed symptoms and etiology patient presents with today. Discussed monitoring symptoms and home cares. Advised that he wear his [employer] boots that have more support, area for blood flow, and protection. If not improving, consider podiatry referral.     Encouraged continued healthy diet and regular exercise for weight loss efforts.     Patient declined influenza vaccination at this time. Will do FIT test fr colon cancer screenings, declined colonoscopy- discussed risk/benefit.     Mental health improved- on Topamax, managed with psychiatry.     Follow up with PCP if symptoms do not improve or worsen for podiatry referral. Follow up with PCP in 1 month for physical exam with fasting lab work or prn.     The information in this document, created by the medical scribe for me, accurately reflects the services I " personally performed and the decisions made by me. I have reviewed and approved this document for accuracy prior to leaving the patient care area.  Sangeetha Salazar CNP  3:15 PM, 11/05/18    DAWOOD Cody CNP  [facility]

## 2018-11-05 ENCOUNTER — OFFICE VISIT (OUTPATIENT)
Dept: FAMILY MEDICINE | Facility: CLINIC | Age: 53
End: 2018-11-05
Payer: COMMERCIAL

## 2018-11-05 VITALS
BODY MASS INDEX: 37.47 KG/M2 | TEMPERATURE: 98 F | HEIGHT: 73 IN | RESPIRATION RATE: 18 BRPM | WEIGHT: 282.7 LBS | DIASTOLIC BLOOD PRESSURE: 84 MMHG | HEART RATE: 74 BPM | OXYGEN SATURATION: 97 % | SYSTOLIC BLOOD PRESSURE: 110 MMHG

## 2018-11-05 DIAGNOSIS — Z12.11 SCREEN FOR COLON CANCER: ICD-10-CM

## 2018-11-05 DIAGNOSIS — E66.01 MORBID OBESITY (H): ICD-10-CM

## 2018-11-05 DIAGNOSIS — Z11.4 SCREENING FOR HIV (HUMAN IMMUNODEFICIENCY VIRUS): ICD-10-CM

## 2018-11-05 DIAGNOSIS — R20.9 ALTERED SENSATION OF FOOT: Primary | ICD-10-CM

## 2018-11-05 PROCEDURE — 99213 OFFICE O/P EST LOW 20 MIN: CPT | Performed by: NURSE PRACTITIONER

## 2018-11-05 ASSESSMENT — ANXIETY QUESTIONNAIRES
3. WORRYING TOO MUCH ABOUT DIFFERENT THINGS: NOT AT ALL
6. BECOMING EASILY ANNOYED OR IRRITABLE: NOT AT ALL
1. FEELING NERVOUS, ANXIOUS, OR ON EDGE: NOT AT ALL
IF YOU CHECKED OFF ANY PROBLEMS ON THIS QUESTIONNAIRE, HOW DIFFICULT HAVE THESE PROBLEMS MADE IT FOR YOU TO DO YOUR WORK, TAKE CARE OF THINGS AT HOME, OR GET ALONG WITH OTHER PEOPLE: NOT DIFFICULT AT ALL
5. BEING SO RESTLESS THAT IT IS HARD TO SIT STILL: SEVERAL DAYS
GAD7 TOTAL SCORE: 2
7. FEELING AFRAID AS IF SOMETHING AWFUL MIGHT HAPPEN: NOT AT ALL
2. NOT BEING ABLE TO STOP OR CONTROL WORRYING: NOT AT ALL

## 2018-11-05 ASSESSMENT — PAIN SCALES - GENERAL: PAINLEVEL: NO PAIN (0)

## 2018-11-05 ASSESSMENT — PATIENT HEALTH QUESTIONNAIRE - PHQ9
SUM OF ALL RESPONSES TO PHQ QUESTIONS 1-9: 7
5. POOR APPETITE OR OVEREATING: SEVERAL DAYS

## 2018-11-05 NOTE — MR AVS SNAPSHOT
After Visit Summary   11/5/2018    rTung Dubois    MRN: [id number]           Patient Information     Date Of Birth          1965        Visit Information        Provider Department      11/5/2018 2:40 PM Sangeetha Salazar APRN CNP [facility]        Today's Diagnoses     Altered sensation of foot    -  1    Morbid obesity (H)        Screen for colon cancer        Screening for HIV (human immunodeficiency virus)           Follow-ups after your visit        Follow-up notes from your care team     Return in about 1 month (around 12/5/2018) for Recheck symptoms if not improving or worsening, Physical Exam, Lab Work.      Who to contact     If you have questions or need follow up information about today's clinic visit or your schedule please contact [facility] directly at [phone].  Normal or non-critical lab and imaging results will be communicated to you by MyChart, letter or phone within 4 business days after the clinic has received the results. If you do not hear from us within 7 days, please contact the clinic through [employer] or phone. If you have a critical or abnormal lab result, we will notify you by phone as soon as possible.  Submit refill requests through [employer] or call your pharmacy and they will forward the refill request to us. Please allow 3 business days for your refill to be completed.          Additional Information About Your Visit        MyChart Information     [employer] gives you secure access to your electronic health record. If you see a primary care provider, you can also send messages to your care team and make appointments. If you have questions, please call your primary care clinic.  If you do not have a primary care provider, please call [phone] and they will assist you.        Care EveryWhere ID     This is your Care EveryWhere ID. This could be used by other organizations to access your [facility] medical records  [id number]        Your  "Vitals Were     Pulse Temperature Respirations Height Pulse Oximetry BMI (Body Mass Index)    74 98  F (36.7  C) (Temporal) 18 6' 0.5\" (1.842 m) 97% 37.81 kg/m2       Blood Pressure from Last 3 Encounters:   11/05/18 110/84   06/29/18 130/76   05/29/18 124/80    Weight from Last 3 Encounters:   11/05/18 282 lb 11.2 oz (128.2 kg)   06/29/18 285 lb (129.3 kg)   05/29/18 280 lb (127 kg)              Today, you had the following     No orders found for display       Primary Care Provider Office Phone # Fax #    Sangeetha DAWOOD Ortiz [facility] [phone] [address] [facility] [address]        Equal Access to Services     [facility]: Hadii lorena avendaño hadasho Soomaali, waaxda luqadaha, qaybta kaalmada adeegyada, ellis shah . So [facility] [phone].    ATENCIÓN: Si habla español, tiene a zaragoza disposición servicios gratuitos de asistencia lingüística. Gaby al [phone].    We comply with applicable federal civil rights laws and [place] laws. We do not discriminate on the basis of race, color, national origin, age, disability, sex, sexual orientation, or gender identity.            Thank you!     Thank you for choosing [facility]  for your care. Our goal is always to provide you with excellent care. Hearing back from our patients is one way we can continue to improve our services. Please take a few minutes to complete the written survey that you may receive in the mail after your visit with us. Thank you!             Your Updated Medication List - Protect others around you: Learn how to safely use, store and throw away your medicines at www.disposemymeds.org.          This list is accurate as of 11/5/18 11:59 PM.  Always use your most recent med list.                   Brand Name Dispense Instructions for use Diagnosis    DAILY MULTIVITAMIN PO      Take 1 tablet by mouth daily.        pantoprazole 20 MG EC tablet    PROTONIX    90 tablet    TAKE 1 TAB BY MOUTH ONCE " DAILY 30-60 MINUTES BEFORE A MEAL    Gastroesophageal reflux disease, esophagitis presence not specified       topiramate 50 MG tablet    TOPAMAX     TAKE 1 TAB DAILY FOR 1WK THEN 2 TABS FOR 1WK THEN 3 TABS FOR 1WK THEN 4 TABS AT BEDTIME

## 2018-11-06 ASSESSMENT — ANXIETY QUESTIONNAIRES: GAD7 TOTAL SCORE: 2
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
## 2018-12-14 ENCOUNTER — OFFICE VISIT (OUTPATIENT)
Dept: FAMILY MEDICINE | Facility: CLINIC | Age: 53
End: 2018-12-14
Payer: COMMERCIAL

## 2018-12-14 VITALS
SYSTOLIC BLOOD PRESSURE: 110 MMHG | HEIGHT: 74 IN | TEMPERATURE: 98 F | BODY MASS INDEX: 36.56 KG/M2 | HEART RATE: 70 BPM | DIASTOLIC BLOOD PRESSURE: 78 MMHG | RESPIRATION RATE: 18 BRPM | WEIGHT: 284.9 LBS | OXYGEN SATURATION: 98 %

## 2018-12-14 DIAGNOSIS — Z00.00 ROUTINE GENERAL MEDICAL EXAMINATION AT A HEALTH CARE FACILITY: ICD-10-CM

## 2018-12-14 DIAGNOSIS — F39 MOOD DISORDER (H): Primary | ICD-10-CM

## 2018-12-14 DIAGNOSIS — G47.33 OSA (OBSTRUCTIVE SLEEP APNEA): ICD-10-CM

## 2018-12-14 DIAGNOSIS — E66.01 MORBID OBESITY (H): ICD-10-CM

## 2018-12-14 DIAGNOSIS — K21.9 GASTROESOPHAGEAL REFLUX DISEASE, ESOPHAGITIS PRESENCE NOT SPECIFIED: ICD-10-CM

## 2018-12-14 DIAGNOSIS — Z00.01 ENCOUNTER FOR ROUTINE ADULT MEDICAL EXAM WITH ABNORMAL FINDINGS: ICD-10-CM

## 2018-12-14 LAB
CHOLEST SERPL-MCNC: 186 MG/DL
GLUCOSE SERPL-MCNC: 103 MG/DL (ref 70–99)
HDLC SERPL-MCNC: 41 MG/DL
HGB BLD-MCNC: 14.5 G/DL (ref 13.3–17.7)
HIV 1+2 AB+HIV1 P24 AG SERPL QL IA: NONREACTIVE
LDLC SERPL CALC-MCNC: 122 MG/DL
NONHDLC SERPL-MCNC: 145 MG/DL
PSA SERPL-ACNC: 1.52 UG/L (ref 0–4)
TRIGL SERPL-MCNC: 113 MG/DL
TSH SERPL DL<=0.005 MIU/L-ACNC: 1.49 MU/L (ref 0.4–4)

## 2018-12-14 PROCEDURE — 84443 ASSAY THYROID STIM HORMONE: CPT | Performed by: NURSE PRACTITIONER

## 2018-12-14 PROCEDURE — 80061 LIPID PANEL: CPT | Performed by: NURSE PRACTITIONER

## 2018-12-14 PROCEDURE — 82947 ASSAY GLUCOSE BLOOD QUANT: CPT | Performed by: NURSE PRACTITIONER

## 2018-12-14 PROCEDURE — 85018 HEMOGLOBIN: CPT | Performed by: NURSE PRACTITIONER

## 2018-12-14 PROCEDURE — 87389 HIV-1 AG W/HIV-1&-2 AB AG IA: CPT | Performed by: NURSE PRACTITIONER

## 2018-12-14 PROCEDURE — 36415 COLL VENOUS BLD VENIPUNCTURE: CPT | Performed by: NURSE PRACTITIONER

## 2018-12-14 PROCEDURE — 99396 PREV VISIT EST AGE 40-64: CPT | Performed by: NURSE PRACTITIONER

## 2018-12-14 PROCEDURE — G0103 PSA SCREENING: HCPCS | Performed by: NURSE PRACTITIONER

## 2018-12-14 RX ORDER — TOPIRAMATE 50 MG/1
TABLET, FILM COATED ORAL
Refills: 1 | Status: CANCELLED | OUTPATIENT
Start: 2018-12-14

## 2018-12-14 RX ORDER — PANTOPRAZOLE SODIUM 20 MG/1
TABLET, DELAYED RELEASE ORAL
Qty: 90 TABLET | Refills: 0 | Status: SHIPPED | OUTPATIENT
Start: 2018-12-14 | End: 2019-05-30

## 2018-12-14 ASSESSMENT — ENCOUNTER SYMPTOMS
COUGH: 0
NAUSEA: 0
EYE PAIN: 1
JOINT SWELLING: 0
DIZZINESS: 0
PALPITATIONS: 0
PARESTHESIAS: 0
HEARTBURN: 1
FREQUENCY: 0
CHILLS: 0
ABDOMINAL PAIN: 0
FEVER: 0
NERVOUS/ANXIOUS: 0
DIARRHEA: 0
CONSTIPATION: 0
HEMATOCHEZIA: 0
SORE THROAT: 0
MYALGIAS: 0
DYSURIA: 0
HEMATURIA: 0
ARTHRALGIAS: 0

## 2018-12-14 ASSESSMENT — ANXIETY QUESTIONNAIRES
GAD7 TOTAL SCORE: 0
7. FEELING AFRAID AS IF SOMETHING AWFUL MIGHT HAPPEN: NOT AT ALL
7. FEELING AFRAID AS IF SOMETHING AWFUL MIGHT HAPPEN: NOT AT ALL
3. WORRYING TOO MUCH ABOUT DIFFERENT THINGS: NOT AT ALL
6. BECOMING EASILY ANNOYED OR IRRITABLE: NOT AT ALL
4. TROUBLE RELAXING: NOT AT ALL
GAD7 TOTAL SCORE: 0
5. BEING SO RESTLESS THAT IT IS HARD TO SIT STILL: NOT AT ALL
1. FEELING NERVOUS, ANXIOUS, OR ON EDGE: NOT AT ALL
2. NOT BEING ABLE TO STOP OR CONTROL WORRYING: NOT AT ALL
GAD7 TOTAL SCORE: 0

## 2018-12-14 ASSESSMENT — PATIENT HEALTH QUESTIONNAIRE - PHQ9
SUM OF ALL RESPONSES TO PHQ QUESTIONS 1-9: 3
10. IF YOU CHECKED OFF ANY PROBLEMS, HOW DIFFICULT HAVE THESE PROBLEMS MADE IT FOR YOU TO DO YOUR WORK, TAKE CARE OF THINGS AT HOME, OR GET ALONG WITH OTHER PEOPLE: NOT DIFFICULT AT ALL
SUM OF ALL RESPONSES TO PHQ QUESTIONS 1-9: 3

## 2018-12-14 ASSESSMENT — PAIN SCALES - GENERAL: PAINLEVEL: MODERATE PAIN (4)

## 2018-12-14 ASSESSMENT — MIFFLIN-ST. JEOR: SCORE: 2215.43

## 2018-12-15 ASSESSMENT — PATIENT HEALTH QUESTIONNAIRE - PHQ9: SUM OF ALL RESPONSES TO PHQ QUESTIONS 1-9: 3

## 2018-12-15 ASSESSMENT — ANXIETY QUESTIONNAIRES: GAD7 TOTAL SCORE: 0

## 2019-01-09 ENCOUNTER — DOCUMENTATION ONLY (OUTPATIENT)
Dept: SLEEP MEDICINE | Facility: CLINIC | Age: 54
End: 2019-01-09

## 2019-01-09 DIAGNOSIS — G47.33 OSA (OBSTRUCTIVE SLEEP APNEA): ICD-10-CM

## 2019-01-09 DIAGNOSIS — E66.01 MORBID OBESITY (H): ICD-10-CM

## 2019-01-09 NOTE — PROGRESS NOTES
6 month [facility] Recheck Visit     Diagnostic AHI:   5.7 events per hour HST    Message left for patient to return call     Assessment: Pt not meeting objective benchmarks for compliance     Action plan: waiting for patient to return call.   pt to follow up per provider request       Device type: Auto-CPAP  PAP settings: CPAP min 5 cm  H20     CPAP max 15 cm  H20  Objective measures: No use since 9/5/18      Objective measure goal  Compliance   Goal >70%  Leak   Goal < 24 lpm  AHI  Goal < 5  Usage  Goal >240

## 2019-01-30 PROCEDURE — 82274 ASSAY TEST FOR BLOOD FECAL: CPT | Performed by: NURSE PRACTITIONER

## 2019-02-02 LAB — HEMOCCULT STL QL IA: NEGATIVE

## 2019-02-04 DIAGNOSIS — Z00.01 ENCOUNTER FOR ROUTINE ADULT MEDICAL EXAM WITH ABNORMAL FINDINGS: ICD-10-CM

## 2019-02-14 ENCOUNTER — TELEPHONE (OUTPATIENT)
Dept: FAMILY MEDICINE | Facility: CLINIC | Age: 54
End: 2019-02-14

## 2019-02-14 NOTE — TELEPHONE ENCOUNTER
Reason for call:  Pt is calling because he was in the ED on 1/18th and he would like Sangeetha Salazar to look at his ED notes and discharge. He got a call from ENT to come in because the ED sent a referral to them and stated he needs to come in for an infection. He was told at the ED that everything was fine. He just wants KL to take a look and see what is going on. Please advise.

## 2019-02-14 NOTE — TELEPHONE ENCOUNTER
LM- Non C2C- advised I sent him a [employer] message.     Does need to see ENT- has chronic sinus disease. Let me know if referral needed. Sangeetha Salazar

## 2019-02-18 ENCOUNTER — TELEPHONE (OUTPATIENT)
Dept: FAMILY MEDICINE | Facility: CLINIC | Age: 54
End: 2019-02-18

## 2019-02-18 DIAGNOSIS — S09.93XD FACIAL INJURY, SUBSEQUENT ENCOUNTER: Primary | ICD-10-CM
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
## 2019-02-18 NOTE — TELEPHONE ENCOUNTER
Reason for Call: Request for an order or referral:    Order or referral being requested: ENT    Date needed: as soon as possible    Has the patient been seen by the PCP for this problem? YES    Additional comments: patient was referred to an ENT down towards the [facility] but he would like to be referred to someone closer and would talk to Sangeetha Salazar or nurse about the issue with his sinuses being related to work comp injury he had. Would like to talk to someone before scheduling an appointment.     Phone number Patient can be reached at:  Cell number on file:    Telephone Information:   Mobile [phone]       Best Time:  any    Can we leave a detailed message on this number?  YES    Call taken on 2/18/2019 at 9:32 AM by Sonia Khan

## 2019-02-19 NOTE — TELEPHONE ENCOUNTER
Referral placed- please give pt phone numbers in referral- he can call to schedule. Liseth Mckeon PA-C

## 2019-02-25 ENCOUNTER — TELEPHONE (OUTPATIENT)
Dept: FAMILY MEDICINE | Facility: CLINIC | Age: 54
End: 2019-02-25
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
## 2019-02-25 NOTE — TELEPHONE ENCOUNTER
Reason for Call:  Other call back    Detailed comments: pt states that we told him that his injuries are work related and he states his employer is denying that it is work related. He states that he needs proof that this is work related so they will pay the cost of his appointments. They are questioning how a sinus infection is related to the injury. Please call and advise. Pt would like a call back asap.    Phone Number Patient can be reached at: Home number on file [phone] (home)    Best Time: any    Can we leave a detailed message on this number? YES    Call taken on 2/25/2019 at 3:35 PM by Sravani Rosales

## 2019-03-07 NOTE — PROGRESS NOTES
"  SUBJECTIVE:   Trung Dubois is a 53 year old male who presents to clinic today for the following health issues:      HPI  ED/UC Followup:    Facility:   [facility]   Date of visit: 1/18/2019   Reason for visit: Contusion of right eye   Current Status: Stable. Swelling and discoloration  of right eye has decreased.      Eye(s) Problem  Onset: 1/18/2019    Description:   Location: right eye   Pain: YES  Redness: no     Accompanying Signs & Symptoms:  Discharge/mattering: no   Swelling: no   Visual changes: no   Fever: no   Nasal Congestion: YES  Bothered by bright lights: no     History:   Trauma: YES  Foreign body exposure: no     Precipitating factors:   Wearing contacts: no     Alleviating factors:  Improved by: ibuprofen 800 Mg      Therapies Tried and outcome: ibuprofen 800 Mg      Right eye - struck with steel bar at work. Went to the ER at [facility]. They did CT scan.   He was told they saw \"damage to the sinus cavity\". He was referred to ENT- but he didn't go to see ENT  He called here to see one of out ENTs instead so put referral in.   Was having sx of bruising and swelling around the right eye and blurred vision.       Imaging Results - in this encounter  CT ORBITS WITHOUT CONTRAST (01/18/2019 2:05 PM CST)  CT ORBITS WITHOUT CONTRAST (01/18/2019 2:05 PM CST)   Impressions Performed At   IMPRESSION:     1.  Right infraorbital/malar soft tissue edema without post septal extension.    2.  No facial bone fracture.    3.  Moderate right sphenoid sinus disease with minimal anterior ethmoid sinus involvement bilaterally.        REPORT SIGNED BY Alejandro Luna M.D.   TEST       Dry hacking cough during winter   Cough some more past few weeks  CPAP - quit 2-3 mo ago  Before head injury- doesn't recall blowing a nose a lot, congestion, obstructed breathing.   But in past few weeks has had more nasal drainage, post-nasal drainage, sore throat and cough. Wondering if the head injury \"knocked all that " "stuff loose\".   No fevers, chills.   Facial tenderness from injury still.       Problem list and histories reviewed & adjusted, as indicated.  Additional history: as documented      Patient Active Problem List   Diagnosis     CARDIOVASCULAR SCREENING; LDL GOAL LESS THAN 160     GERD (gastroesophageal reflux disease)     Mood disorder (H)     Suicidal ideation     Major depression     Generalized anxiety disorder     BMI over 36     FAUZIA (obstructive sleep apnea)     Past Surgical History:   Procedure Laterality Date     LASIK      left eye only     TONSILLECTOMY      AGE 5       Social History     Tobacco Use     Smoking status: Never Smoker     Smokeless tobacco: Never Used   Substance Use Topics     Alcohol use: Yes     Comment: 1-2 drinks per week      Family History   Problem Relation Age of Onset     Diabetes Mother      Leukemia Mother      Psychotic Disorder Father         Depression/suicide     Family History Negative Other      Depression Brother         Suicide/Bipolar     Asthma No family hx of      C.A.D. No family hx of      Hypertension No family hx of      Cerebrovascular Disease No family hx of      Breast Cancer No family hx of      Cancer - colorectal No family hx of      Prostate Cancer No family hx of      Alcohol/Drug No family hx of      Allergies No family hx of      Alzheimer Disease No family hx of      Anesthesia Reaction No family hx of      Blood Disease No family hx of      Arthritis No family hx of      Cancer No family hx of      Cardiovascular No family hx of      Circulatory No family hx of      Congenital Anomalies No family hx of      Connective Tissue Disorder No family hx of          Current Outpatient Medications   Medication Sig Dispense Refill     amoxicillin-clavulanate (AUGMENTIN) 875-125 MG tablet Take 1 tablet by mouth 2 times daily 28 tablet 0     ibuprofen (ADVIL/MOTRIN) 800 MG tablet Take 800 mg by mouth       Multiple Vitamin (DAILY MULTIVITAMIN PO) Take 1 tablet by " "mouth daily.       pantoprazole (PROTONIX) 20 MG EC tablet TAKE 1 TAB BY MOUTH ONCE DAILY, prn  30-60 MINUTES BEFORE A MEAL (Patient not taking: Reported on 3/8/2019) 90 tablet 0     topiramate (TOPAMAX) 50 MG tablet TAKE 1 TAB DAILY FOR 1WK THEN 2 TABS FOR 1WK THEN 3 TABS FOR 1WK THEN 4 TABS AT BEDTIME  1     Allergies   Allergen Reactions     Prevacid [Lansoprazole] Nausea     BP Readings from Last 3 Encounters:   03/08/19 128/68   12/14/18 110/78   11/05/18 110/84    Wt Readings from Last 3 Encounters:   03/08/19 123.1 kg (271 lb 6.4 oz)   12/14/18 129.2 kg (284 lb 14.4 oz)   11/05/18 128.2 kg (282 lb 11.2 oz)                  Labs reviewed in EPIC    ROS:  Constitutional, HEENT, cardiovascular, pulmonary, gi and gu systems are negative, except as otherwise noted.    OBJECTIVE:     /68   Pulse 74   Temp 98.1  F (36.7  C) (Oral)   Resp 18   Ht 1.88 m (6' 2\")   Wt 123.1 kg (271 lb 6.4 oz)   SpO2 97%   BMI 34.85 kg/m    Body mass index is 34.85 kg/m .  GENERAL: healthy, alert and no distress  EYES: Eyes grossly normal to inspection, PERRL and conjunctivae and sclerae normal  HENT: Normal cephalic/atraumatic. Mild tenderness right infraorbit , no bruising remaining. Right ear: canal clear and TM's normal, no effusion.  Left ear: canal clear and TM's normal, no effusion. Bilateral: no pain w/manipulation auricle, tragus, or mastoid percussion. Nose mucosa: mild erythema. Lips normal, no lesions. Buccal muscosa moist. Soft palate no lesions or ulcers. Tonsils normal, no erythema, no exudates. Uvula midline. Posterior oropharynx no erythema.   NECK: no adenopathy, no asymmetry, masses, or scars and thyroid normal to palpation  RESP: lungs clear to auscultation - no rales, rhonchi or wheezes  CV: regular rate and rhythm, normal S1 S2, no S3 or S4, no murmur, click or rub, no peripheral edema and peripheral pulses strong  NEURO: Normal strength and tone, mentation intact and speech normal  PSYCH: mentation " appears normal, affect normal/bright    Diagnostic Test Results:  none     ASSESSMENT/PLAN:       1. Subacute sphenoidal sinusitis  Sinusitis was incidental on CT done for head injury in the ER.   Treat as below for 2 weeks. Pt to follow up in clinic if sx he is experiencing persist- discussed may need repeat CT or ENT consult at that time  If he feels better then can defer f/u  - amoxicillin-clavulanate (AUGMENTIN) 875-125 MG tablet; Take 1 tablet by mouth 2 times daily  Dispense: 28 tablet; Refill: 0    Follow Up: The patient was instructed to contact clinic for worsening symptoms, non-improvement as expected/discussed, and for questions regarding medications or treatment plan. Discussed parameters for follow up and included in After Visit Summary given to patient.      Liseth Mckeon PA-C  [facility]

## 2019-03-08 ENCOUNTER — OFFICE VISIT (OUTPATIENT)
Dept: FAMILY MEDICINE | Facility: CLINIC | Age: 54
End: 2019-03-08

## 2019-03-08 VITALS
BODY MASS INDEX: 34.83 KG/M2 | TEMPERATURE: 98.1 F | HEIGHT: 74 IN | DIASTOLIC BLOOD PRESSURE: 68 MMHG | WEIGHT: 271.4 LBS | OXYGEN SATURATION: 97 % | SYSTOLIC BLOOD PRESSURE: 128 MMHG | RESPIRATION RATE: 18 BRPM | HEART RATE: 74 BPM

## 2019-03-08 DIAGNOSIS — J01.30 SUBACUTE SPHENOIDAL SINUSITIS: Primary | ICD-10-CM

## 2019-03-08 PROCEDURE — 99214 OFFICE O/P EST MOD 30 MIN: CPT | Performed by: PHYSICIAN ASSISTANT

## 2019-03-08 RX ORDER — IBUPROFEN 800 MG/1
800 TABLET, FILM COATED ORAL
COMMUNITY
Start: 2019-01-18 | End: 2020-01-06

## 2019-03-08 ASSESSMENT — MIFFLIN-ST. JEOR: SCORE: 2150.81

## 2019-03-08 NOTE — PATIENT INSTRUCTIONS
augmentin twice daily x 14 days   Take with food  May cause stomach upset and loose stools    Follow up if you don't feel better or if you have worse sinus pressure/headaches

## 2019-04-17 ENCOUNTER — TELEPHONE (OUTPATIENT)
Dept: FAMILY MEDICINE | Facility: CLINIC | Age: 54
End: 2019-04-17

## 2019-04-17 NOTE — TELEPHONE ENCOUNTER
Something hit him in the eye.   Hurts when he blinks.   Tried gett    Trung Dubois is a 54 year old male who calls with symptoms.    NURSING ASSESSMENT:  Description: Patients eye hurts when he blinks  Onset/duration:  This morning  Precip. factors:  Got something in it while at work - unknown as to what.  Improves/worsens symptoms:  He tried to make his eye water, which just increased the pain  Last exam/Treatment:  On file  Allergies:   Allergies   Allergen Reactions     Prevacid [Lansoprazole] Nausea       RECOMMENDED DISPOSITION: I recommended patient contact an eye doctor. No availability in clinic today  Will comply with recommendation: YES   If further questions/concerns or if Sx do not improve, worsen or new Sx develop, call your PCP or [facility] as soon as possible.    NOTES:  Disposition was determined by the first positive assessment question, therefore all previous assessment questions were negative.     Guideline used:  Telephone Triage Protocols for Nurses, Fifth Edition, Sonja Reyes, RN, BSN

## 2019-05-01 ENCOUNTER — TELEPHONE (OUTPATIENT)
Dept: FAMILY MEDICINE | Facility: CLINIC | Age: 54
End: 2019-05-01

## 2019-05-01 DIAGNOSIS — K42.9 UMBILICAL HERNIA WITHOUT OBSTRUCTION AND WITHOUT GANGRENE: Primary | ICD-10-CM

## 2019-05-01 NOTE — TELEPHONE ENCOUNTER
Reason for Call:  Other call back    Detailed comments: pt calling to talk to colt further about the hernia surgery that she discussed he may need in the future.  Please call to advise.    Phone Number Patient can be reached at: Cell number on file:    Telephone Information:   Mobile [phone]       Best Time: any    Can we leave a detailed message on this number? YES    Call taken on 5/1/2019 at 1:08 PM by Susanne Frances

## 2019-05-13 NOTE — TELEPHONE ENCOUNTER
Patient calling back, states that he never got a message on his my chart with the referral information. Patient would like a call back some time tomorrow. Please advise.

## 2019-05-20 ENCOUNTER — TELEPHONE (OUTPATIENT)
Dept: FAMILY MEDICINE | Facility: CLINIC | Age: 54
End: 2019-05-20

## 2019-05-20 NOTE — TELEPHONE ENCOUNTER
You placed a referral for patient to general surgery on 5/3/19.  Patient has not scheduled as of yet.      Please review and forward to team if follow up with the patient is needed.     Thank you!  [facility]Clinic Referrals [facility]

## 2019-05-30 ENCOUNTER — TELEPHONE (OUTPATIENT)
Dept: FAMILY MEDICINE | Facility: CLINIC | Age: 54
End: 2019-05-30

## 2019-05-30 ENCOUNTER — OFFICE VISIT (OUTPATIENT)
Dept: FAMILY MEDICINE | Facility: CLINIC | Age: 54
End: 2019-05-30
Payer: COMMERCIAL

## 2019-05-30 VITALS
BODY MASS INDEX: 36.63 KG/M2 | HEART RATE: 76 BPM | TEMPERATURE: 99 F | SYSTOLIC BLOOD PRESSURE: 126 MMHG | RESPIRATION RATE: 18 BRPM | HEIGHT: 73 IN | DIASTOLIC BLOOD PRESSURE: 82 MMHG | WEIGHT: 276.4 LBS

## 2019-05-30 DIAGNOSIS — K21.9 GASTROESOPHAGEAL REFLUX DISEASE, ESOPHAGITIS PRESENCE NOT SPECIFIED: ICD-10-CM

## 2019-05-30 PROCEDURE — 99214 OFFICE O/P EST MOD 30 MIN: CPT | Performed by: NURSE PRACTITIONER

## 2019-05-30 PROCEDURE — 93000 ELECTROCARDIOGRAM COMPLETE: CPT | Performed by: NURSE PRACTITIONER

## 2019-05-30 RX ORDER — PANTOPRAZOLE SODIUM 20 MG/1
TABLET, DELAYED RELEASE ORAL
Qty: 90 TABLET | Refills: 0 | Status: SHIPPED | OUTPATIENT
Start: 2019-05-30 | End: 2019-12-05

## 2019-05-30 ASSESSMENT — ANXIETY QUESTIONNAIRES
7. FEELING AFRAID AS IF SOMETHING AWFUL MIGHT HAPPEN: NOT AT ALL
5. BEING SO RESTLESS THAT IT IS HARD TO SIT STILL: NOT AT ALL
GAD7 TOTAL SCORE: 4
6. BECOMING EASILY ANNOYED OR IRRITABLE: NOT AT ALL
2. NOT BEING ABLE TO STOP OR CONTROL WORRYING: SEVERAL DAYS
1. FEELING NERVOUS, ANXIOUS, OR ON EDGE: SEVERAL DAYS
3. WORRYING TOO MUCH ABOUT DIFFERENT THINGS: SEVERAL DAYS
IF YOU CHECKED OFF ANY PROBLEMS ON THIS QUESTIONNAIRE, HOW DIFFICULT HAVE THESE PROBLEMS MADE IT FOR YOU TO DO YOUR WORK, TAKE CARE OF THINGS AT HOME, OR GET ALONG WITH OTHER PEOPLE: SOMEWHAT DIFFICULT

## 2019-05-30 ASSESSMENT — PATIENT HEALTH QUESTIONNAIRE - PHQ9
5. POOR APPETITE OR OVEREATING: SEVERAL DAYS
SUM OF ALL RESPONSES TO PHQ QUESTIONS 1-9: 6

## 2019-05-30 ASSESSMENT — MIFFLIN-ST. JEOR: SCORE: 2148.12

## 2019-05-30 NOTE — TELEPHONE ENCOUNTER
Reason for Call:  Same Day Appointment, Requested Provider:  Sangeetha Salazar DNP, FNP-BC    PCP: Sangeetha Salazar    Reason for visit: patient called a Rn hotline for chest pains and told him to go to the ER and he declined, he wants to be seen today around noon by Sangeetha only. (I offered him other providers and he declined)  I also tried to transfer him to a Rn to speak to him urgently and he declined that as well. He said he feels it is muscle spasms.     Duration of symptoms:     Have you been treated for this in the past? No    Additional comments:     Can we leave a detailed message on this number? NO    Phone number patient can be reached at: [phone]    Best Time:     Call taken on 5/30/2019 at 7:13 AM by Susanne Holly

## 2019-05-30 NOTE — TELEPHONE ENCOUNTER
Trung Dubois is a 54 year old male who calls with concerns of chest pains.  Unable to rate pain.  He reports that he is having pain in the morning for the past 4 days.  He states that pain does improve as the day goes on.  He is wondering if he is sleeping wrong as his pain is just in the morning.  No back pain, SOB, numbness or tingling in the left arm, no jaw pain.  He reports a history of acid reflux and is using Protonix as needed.  Has had stress tests in the past, both negative.  He denies needing to use OTC pain medications.  He denies need for ED, he reports that he will not go and sit there half a day for them to tell him it is muscle spasms.  He is requesting a clinic appointment.     NURSING ASSESSMENT:  Description:  Chest pain.   Onset/duration:  Past 4 days.   Associated symptoms:  Acid reflux  Improves/worsens symptoms:  No change.  Pain scale (0-10)   Unable to rate.   Last exam/Treatment:  03/08/2019  Allergies:   Allergies   Allergen Reactions     Prevacid [Lansoprazole] Nausea     NURSING PLAN: Routed to provider Yes    RECOMMENDED DISPOSITION:  Encouraged to seek emergent care, he declines.  Will route to PCP for review and work-in if appropriate.   Will comply with recommendation: Unknown.   If further questions/concerns or if symptoms do not improve, worsen or new symptoms develop, call your PCP or [facility] as soon as possible.    Guideline used:  Chest pain  Telephone Triage Protocols for Nurses, Fifth Edition, Sonja Ruiz RN

## 2019-05-30 NOTE — PROGRESS NOTES
"Subjective     Trung Dubois is a 54 year old male who presents to clinic today for the following health issues:    HPI   CHEST PAIN     Onset: the last three mornings he has woken up and was experiencing it, this morning has been fine, had really bad anxiety, slight pain in his last run between 1130-12    Description:   Location:  Middle of chest to the left side  Character: steady pain,   Radiation: on left side  Duration: intermittent for a few minutes, then would come for a few minutes    Intensity: 4-5/10, more worried about signs of heart attack.     Progression of Symptoms:  Improving after 8am    Accompanying Signs & Symptoms:  Shortness of breath: no  Sweating: no  Nausea/vomiting: no  Lightheadedness: no  Palpitations: no  Fever/Chills: no  Cough: no  Heartburn: YES- on and off depending on what he is eating, last night had a little bit due to eating right before bed.       History:   Family history of heart disease no  Tobacco use: no    Precipitating factors:   Worse with exertion: no-feels better  Worse with deep breaths :  no  Related to food: no, hasn't had problems like this before because he has had acid reflux.     Alleviating factors:  none       Therapies Tried and outcome: protonix-took one on Monday from [facility]    Chest/ Abdominal Pain  Denies eye sight problems and shortness of breath, sweating. Positive for weakness, chest pain which is radiating to his left side. Denies problems with BM and urination. He describes his abdominal pain as \"neusance\" .     Mood  His mood has been stable. When he feels agitated, he usually goes to the garage instead   Pt mentions that he has not bee sleeping well for the past few weeks.     Patient Active Problem List   Diagnosis     CARDIOVASCULAR SCREENING; LDL GOAL LESS THAN 160     GERD (gastroesophageal reflux disease)     Mood disorder (H)     Suicidal ideation     Major depression     Generalized anxiety disorder     BMI over 36     FAUZIA (obstructive sleep " apnea)     Past Surgical History:   Procedure Laterality Date     LASIK      left eye only     TONSILLECTOMY      AGE 5       Social History     Tobacco Use     Smoking status: Never Smoker     Smokeless tobacco: Never Used   Substance Use Topics     Alcohol use: Yes     Comment: 1-2 drinks per week      Family History   Problem Relation Age of Onset     Diabetes Mother      Leukemia Mother      Psychotic Disorder Father         Depression/suicide     Family History Negative Other      Depression Brother         Suicide/Bipolar     Asthma No family hx of      C.A.D. No family hx of      Hypertension No family hx of      Cerebrovascular Disease No family hx of      Breast Cancer No family hx of      Cancer - colorectal No family hx of      Prostate Cancer No family hx of      Alcohol/Drug No family hx of      Allergies No family hx of      Alzheimer Disease No family hx of      Anesthesia Reaction No family hx of      Blood Disease No family hx of      Arthritis No family hx of      Cancer No family hx of      Cardiovascular No family hx of      Circulatory No family hx of      Congenital Anomalies No family hx of      Connective Tissue Disorder No family hx of          Current Outpatient Medications   Medication Sig Dispense Refill     Multiple Vitamin (DAILY MULTIVITAMIN PO) Take 1 tablet by mouth daily.       pantoprazole (PROTONIX) 20 MG EC tablet TAKE 1 TAB BY MOUTH ONCE DAILY, prn  30-60 MINUTES BEFORE A MEAL 90 tablet 0     ibuprofen (ADVIL/MOTRIN) 800 MG tablet Take 800 mg by mouth       Allergies   Allergen Reactions     Prevacid [Lansoprazole] Nausea     Recent Labs   Lab Test 12/14/18  0843 12/07/17  1531 07/17/16  1205 06/29/15  1756 07/19/14  0808   * 105* 122* 123 106   HDL 41 41 41  --  31*   TRIG 113 226* 98  --  175*   ALT  --   --   --  28 37   CR  --   --  0.98 1.03 1.06   GFRESTIMATED  --   --  81 77 75   GFRESTBLACK  --   --  >90   GFR Calc   >90   GFR  "Calc   >90   POTASSIUM  --   --  4.2 4.0 4.2   TSH 1.49  --   --   --  1.35      BP Readings from Last 3 Encounters:   05/30/19 126/82   03/08/19 128/68   12/14/18 110/78    Wt Readings from Last 3 Encounters:   05/30/19 125.4 kg (276 lb 6.4 oz)   03/08/19 123.1 kg (271 lb 6.4 oz)   12/14/18 129.2 kg (284 lb 14.4 oz)         Reviewed and updated as needed this visit by Provider       Review of Systems   ROS COMP: Constitutional, HEENT, cardiovascular, pulmonary, GI, , musculoskeletal, neuro, skin, endocrine and psych systems are negative, except as otherwise noted.    This document serves as a record of the services and decisions personally performed and made by Sangeetha Salazar CNP. It was created on his/her behalf by Sola Beebe, trained medical scribe. The creation of this document is based the provider's statements to the medical scribes.    Scribe Sola Beebe 3:31 PM, May 30, 2019      Objective    /82   Pulse 76   Temp 99  F (37.2  C) (Temporal)   Resp 18   Ht 1.847 m (6' 0.72\")   Wt 125.4 kg (276 lb 6.4 oz)   BMI 36.75 kg/m    Body mass index is 36.75 kg/m .  Physical Exam   GENERAL: healthy, alert and no distress  EYES: Eyes grossly normal to inspection, PERRL and conjunctivae and sclerae normal  HENT: ear canals and TM's normal, nose and mouth without ulcers or lesions  NECK: no adenopathy, no asymmetry, masses, or scars and thyroid normal to palpation  RESP: lungs clear to auscultation - no rales, rhonchi or wheezes  CV: regular rate and rhythm, normal S1 S2, no S3 or S4, no murmur, click or rub, no peripheral edema and peripheral pulses strong  ABDOMEN: epigastric tenderness, soft, no hepatosplenomegaly, no masses and bowel sounds normal  NEURO: Normal strength and tone, mentation intact and speech normal  PSYCH: mentation appears normal, affect normal/bright    Diagnostic Test Results:  Labs reviewed in Epic      Assessment & Plan       ICD-10-CM    1. Gastroesophageal reflux " "disease, esophagitis presence not specified K21.9 pantoprazole (PROTONIX) 20 MG EC tablet     EKG 12-lead complete w/read - Clinics     Gastroesophageal reflux disease- stable, could use improvement. EKG 12-lead completed, results reviewed at length. Will continue treating with Prontix 20MG. Discussed lack of cardiac symptoms, increased anxiety- pt. Not wanting to treat. Weight loss. Warning signs. Stress echo- negative last year. Re-start PPI, re-check in 2 weeks if not better.     Healthy eating habits and exercise encouraged. Recommended to stay hydrated, drink more water vs caffeineted beverages.     BMI:   Estimated body mass index is 36.75 kg/m  as calculated from the following:    Height as of this encounter: 1.847 m (6' 0.72\").    Weight as of this encounter: 125.4 kg (276 lb 6.4 oz).   Weight management plan: Discussed healthy diet and exercise guidelines    Return in about 1 month (around 6/27/2019).     The information in this document, created by the medical scribe for me, accurately reflects the services I personally performed and the decisions made by me. I have reviewed and approved this document for accuracy prior to leaving the patient care area.  Sangeetha Salazar CNP  4:19 PM, May 30, 2019    [facility]    "

## 2019-05-31 ASSESSMENT — ANXIETY QUESTIONNAIRES: GAD7 TOTAL SCORE: 4

## 2019-06-18 ENCOUNTER — OFFICE VISIT (OUTPATIENT)
Dept: SURGERY | Facility: CLINIC | Age: 54
End: 2019-06-18
Payer: COMMERCIAL

## 2019-06-18 VITALS
WEIGHT: 274 LBS | HEIGHT: 74 IN | BODY MASS INDEX: 35.16 KG/M2 | SYSTOLIC BLOOD PRESSURE: 138 MMHG | TEMPERATURE: 97.5 F | DIASTOLIC BLOOD PRESSURE: 84 MMHG

## 2019-06-18 DIAGNOSIS — G47.33 OSA (OBSTRUCTIVE SLEEP APNEA): ICD-10-CM

## 2019-06-18 DIAGNOSIS — K21.9 GASTROESOPHAGEAL REFLUX DISEASE WITHOUT ESOPHAGITIS: ICD-10-CM

## 2019-06-18 DIAGNOSIS — M62.08 DIASTASIS RECTI: ICD-10-CM

## 2019-06-18 DIAGNOSIS — K42.9 UMBILICAL HERNIA WITHOUT OBSTRUCTION AND WITHOUT GANGRENE: Primary | ICD-10-CM

## 2019-06-18 DIAGNOSIS — E66.01 MORBID OBESITY (H): ICD-10-CM

## 2019-06-18 PROCEDURE — 99244 OFF/OP CNSLTJ NEW/EST MOD 40: CPT | Performed by: SURGERY

## 2019-06-18 ASSESSMENT — MIFFLIN-ST. JEOR: SCORE: 2157.61
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
## 2019-06-18 NOTE — PROGRESS NOTES
Trung to follow up with Primary Care provider regarding elevated blood pressure.    Initial BP : 142/90    Retake end of visit: 138/84    Eunice Dsouza CMA ([facility])

## 2019-06-21 ENCOUNTER — TELEPHONE (OUTPATIENT)
Dept: SURGERY | Facility: CLINIC | Age: 54
End: 2019-06-21

## 2019-06-21 NOTE — TELEPHONE ENCOUNTER
Told pt that I only have the robotic hernia repair open time right now in July 25th; otherwise, it'll have to wait until the end of August.  He will talk to his wife this weekend and will let me know if this date is do-able.  I'll call him Monday afternoon; will place order schedule at that time.      [facility], DO

## 2019-06-24 ENCOUNTER — TELEPHONE (OUTPATIENT)
Dept: SURGERY | Facility: CLINIC | Age: 54
End: 2019-06-24

## 2019-06-24 DIAGNOSIS — K42.9 UMBILICAL HERNIA WITHOUT OBSTRUCTION AND WITHOUT GANGRENE: Primary | ICD-10-CM

## 2019-06-24 NOTE — TELEPHONE ENCOUNTER
Type of surgery: Robotic umbilical hernia repair with mesh, possible laparoscopic, possible open  Location of surgery: [facility]  Date and time of surgery: 7/25  Surgeon: Fredis Hill  Pre-Op Appt Date: 7/11  Post-Op Appt Date: 8/9   Packet sent out: Yes  Pre-cert/Authorization completed:  Not Applicable  Date: na

## 2019-06-24 NOTE — TELEPHONE ENCOUNTER
Surgery Pre-Certification    Medical Record Number: [id number]  Trung Dubois  YOB: 1965   Phone: [phone] (home)   Primary Provider: Sangeetha Salazar    Reason for Admit:  Umbilical hernia    Surgeon: Dr. Hill  Surgical Procedure: Robotic umbilical hernia repair with mesh, possible laparoscopic, possible open  CPT 45139  Umbilical hernia without obstruction and without gangrene [K42.9]  - Primary   ICD-9 Coded: NA  Date of Surgery: 7/25/2019  Consent signed? na    Date signed: na  Hospital: [facility]  Outpatient    Requestor:  Silvia Cruz     Location:  [facility]    Per Availity:  Procedure Code 1  66566 - LAP VENT/ABD HERNIA REPAIR    Reference Number  [id number]  Status  NO AUTH REQUIRED    06/24/2019    Thank you,   Lizette Wilder   Referral Department  [phone]    Insurance valid 07/01/2019

## 2019-06-24 NOTE — TELEPHONE ENCOUNTER
Patient would like to schedule 7/25 surgery at [facility].  Please place surgery order.

## 2019-06-24 NOTE — TELEPHONE ENCOUNTER
Faxing paperwork to [facility]    Thank you,   Lizette Wilder   Referral Department  [phone]

## 2019-06-24 NOTE — TELEPHONE ENCOUNTER
I filled out [employer] forms and faxed over to Rema @ [phone], confirmed fax sent.  Copied forms and sent to stat fax. Placed faxed forms in White binder in top drawer labeled 1st floor : Dr Hill.    Eunice SAMUEL

## 2019-07-03 ENCOUNTER — TELEPHONE (OUTPATIENT)
Dept: SURGERY | Facility: CLINIC | Age: 54
End: 2019-07-03

## 2019-07-03 NOTE — TELEPHONE ENCOUNTER
Writer called patient to verify this is the correct paperwork for FMLA & no date of birth on it and it is.    Patient would like to be off work 6 weeks after surgery and then go back to normal job duties.    Lakesha Millan, CMA

## 2019-07-03 NOTE — TELEPHONE ENCOUNTER
Forms completed.    Faxed to Rema NOLASCO@ [phone].    Copy went to [employer].    Lakesha Millan, CMA

## 2019-07-03 NOTE — TELEPHONE ENCOUNTER
Reason for Call:  Form, our goal is to have forms completed with 72 hours, however, some forms may require a visit or additional information.    Type of letter, form or note:  FMLA    Who is the form from?: [facility] Rema Ballard (if other please explain)    Where did the form come from: form was faxed in    What clinic location was the form placed at?: [place]    Where the form was placed: Given to MA/RN    What number is listed as a contact on the form?: Fax back to Rema NIXON [facility] [phone]       Additional comments:    Call taken on 7/3/2019 at 3:31 PM by Lakesha Millan

## 2019-07-08 NOTE — PATIENT INSTRUCTIONS
Before Your Surgery      Call your surgeon if there is any change in your health. This includes signs of a cold or flu (such as a sore throat, runny nose, cough, rash or fever).    Do not smoke, drink alcohol or take over the counter medicine (unless your surgeon or primary care doctor tells you to) for the 24 hours before and after surgery.    If you take prescribed drugs: Follow your doctor s orders about which medicines to take and which to stop until after surgery.    Eating and drinking prior to surgery: follow the instructions from your surgeon    Take a shower or bath the night before surgery. Use the soap your surgeon gave you to gently clean your skin. If you do not have soap from your surgeon, use your regular soap. Do not shave or scrub the surgery site.  Wear clean pajamas and have clean sheets on your bed.     Consider a gentle laxative after 1-2 days without a BM after surgery.

## 2019-07-11 ENCOUNTER — OFFICE VISIT (OUTPATIENT)
Dept: FAMILY MEDICINE | Facility: CLINIC | Age: 54
End: 2019-07-11
Payer: COMMERCIAL

## 2019-07-11 VITALS
BODY MASS INDEX: 35.32 KG/M2 | RESPIRATION RATE: 18 BRPM | HEIGHT: 74 IN | DIASTOLIC BLOOD PRESSURE: 78 MMHG | WEIGHT: 275.2 LBS | TEMPERATURE: 98.5 F | SYSTOLIC BLOOD PRESSURE: 134 MMHG | HEART RATE: 68 BPM | OXYGEN SATURATION: 98 %

## 2019-07-11 DIAGNOSIS — K42.9 UMBILICAL HERNIA WITHOUT OBSTRUCTION AND WITHOUT GANGRENE: ICD-10-CM

## 2019-07-11 DIAGNOSIS — E66.01 MORBID OBESITY (H): ICD-10-CM

## 2019-07-11 DIAGNOSIS — Z01.818 PREOP GENERAL PHYSICAL EXAM: Primary | ICD-10-CM

## 2019-07-11 DIAGNOSIS — G47.33 OSA (OBSTRUCTIVE SLEEP APNEA): ICD-10-CM

## 2019-07-11 LAB — HGB BLD-MCNC: 14.3 G/DL (ref 13.3–17.7)

## 2019-07-11 PROCEDURE — 85018 HEMOGLOBIN: CPT | Performed by: NURSE PRACTITIONER

## 2019-07-11 PROCEDURE — 99215 OFFICE O/P EST HI 40 MIN: CPT | Performed by: NURSE PRACTITIONER

## 2019-07-11 PROCEDURE — 36415 COLL VENOUS BLD VENIPUNCTURE: CPT | Performed by: NURSE PRACTITIONER

## 2019-07-11 ASSESSMENT — MIFFLIN-ST. JEOR: SCORE: 2163.05

## 2019-07-11 NOTE — Clinical Note
I've called patient. we've completed all necessary forms for him at this time. patient will call back if needing further paperwork completed.Lizette

## 2019-07-11 NOTE — Clinical Note
Bob Kent said his work will put him into short term disability after using PTO for 1 day ( vs 9 days) if you can state the surgery is medically necessary- versus elective. I see that you felt the bowel was partially incarcerated and wondering if you can clear him medically for necessity based on this?Thanks! Sangeetha

## 2019-07-17 NOTE — TELEPHONE ENCOUNTER
LM for patient to return call. I received the below message from provider (attached below). I see FMLA forms have been completed 7/3/19. Need clarification from patient whether or not this is what he was needing filled out.    Lizette Sanches RN on 7/17/2019 at 3:29 PM

## 2019-07-17 NOTE — TELEPHONE ENCOUNTER
Patient said he has had all the paperwork completed that has been needed. He will call us back if there's anything else needed.    Lizette Sanches RN on 7/17/2019 at 3:35 PM

## 2019-07-17 NOTE — TELEPHONE ENCOUNTER
Jalen Hill MD Lewis, Kara L, APRN CNP Cc: Eunice Dsouza; Lizette Sanches, RN; Sully Barajas, SANNA             Oh yes, for sure.  I'll forward this to my nursing staff and whichever forms he needs fill out we can do it!     Thanks!     [facility]    Previous Messages      ----- Message -----   From: Sangeetha Salazar APRN CNP   Sent: 7/11/2019   3:04 PM   To: TateBeatriceh MD Peewee Hill Doug said his work will put him into short term disability after using PTO for 1 day ( vs 9 days) if you can state the surgery is medically necessary- versus elective. I see that you felt the bowel was partially incarcerated and wondering if you can clear him medically for necessity based on this?   Thanks!    Sangeetha

## 2019-07-25 ENCOUNTER — NURSE TRIAGE (OUTPATIENT)
Dept: NURSING | Facility: CLINIC | Age: 54
End: 2019-07-25

## 2019-07-26 ENCOUNTER — NURSE TRIAGE (OUTPATIENT)
Dept: NURSING | Facility: CLINIC | Age: 54
End: 2019-07-26

## 2019-07-26 NOTE — TELEPHONE ENCOUNTER
"Bob had hernia surgery yesterday and is taking pain medication.  Bob is noticing that he is getting a steady pain.  Pain is maybe a \" 3 or 4\" and reaches a \"7 or 8\".  Trung is requesting to speak with on call MD.  FNA paged on call MD Lemnos to phone Trung back at 6:03 pm at [phone].  FNA advised patient to phone back FNA in 20 minutes if no response from on call MD and patient agreed.    "

## 2019-07-28 ENCOUNTER — NURSE TRIAGE (OUTPATIENT)
Dept: NURSING | Facility: CLINIC | Age: 54
End: 2019-07-28

## 2019-07-28 NOTE — TELEPHONE ENCOUNTER
Anurag had hernia surgery on Thursday and is urinating and is having gas.  Trung has gained 10 pounds since surgery.  Last bowel movement was on thed 24th of July.  Trung states that he is going to try over the counter products to have a bowel movement and if no luck will phone FNA back.      Reason for Disposition    Abdomen is more swollen than usual    Additional Information    Negative: Patient sounds very sick or weak to the triager    Negative: [1] Vomiting AND [2] abdomen looks much more swollen than usual    Negative: [1] Vomiting AND [2] contains bile (green color)    Negative: [1] Constant abdominal pain AND [2] present > 2 hours    Negative: [1] Rectal pain or fullness from fecal impaction (rectum full of stool) AND [2] NOT better after SITZ bath, suppository or enema    Negative: [1] Intermittent mild abdominal pain AND [2] fever    Protocols used: CONSTIPATION-A-AH

## 2019-08-01 ENCOUNTER — NURSE TRIAGE (OUTPATIENT)
Dept: NURSING | Facility: CLINIC | Age: 54
End: 2019-08-01

## 2019-08-02 ENCOUNTER — TELEPHONE (OUTPATIENT)
Dept: SURGERY | Facility: CLINIC | Age: 54
End: 2019-08-02

## 2019-08-02 NOTE — TELEPHONE ENCOUNTER
"\"I just has hernia surgery on 7/25, I am doing well, only taking one pain med/day, incisions looks good, But tonight I noticed there's a little area that looks more bruised right under the incision. I was constipated, but I'm not now.\" Denies redness, swelling, fever or other sx. Triaged, gave home care advice, follow up with surgeon in am if needed.  Yola Hall RN [facility]        Reason for Disposition    [1] Caller has NON-URGENT question AND [2] triager unable to answer question    Additional Information    Negative: [1] Widespread rash AND [2] bright red, sunburn-like    Negative: [1] SEVERE headache AND [2] after spinal (epidural) anesthesia    Negative: [1] Vomiting AND [2] persists > 4 hours    Negative: [1] Vomiting AND [2] abdomen looks much more swollen than usual    Negative: [1] Drinking very little AND [2] dehydration suspected (e.g., no urine > 12 hours, very dry mouth, very lightheaded)    Negative: Patient sounds very sick or weak to the triager    Negative: Sounds like a serious complication to the triager    Negative: Fever > 100.5 F (38.1 C)    Negative: [1] SEVERE post-op pain (e.g., excruciating, pain scale 8-10) AND [2] not controlled with pain medications    Negative: [1] Caller has URGENT question AND [2] triager unable to answer question    Negative: [1] Headache AND [2] after spinal (epidural) anesthesia AND [3] not severe    Negative: Fever present > 3 days (72 hours)    Negative: [1] MILD-MODERATE post-op pain (e.g., pain scale 1-7) AND [2] not controlled with pain medications    Protocols used: POST-OP SYMPTOMS AND [id number]      "

## 2019-08-02 NOTE — TELEPHONE ENCOUNTER
Nursing Note    D:  Patient calls with concerns about developing infection. Top and bottom incisions are red about a quarter inch around incision. Middle one bruise color. Coloration  Has been since surgery. Denies, redness, warmth, drainage, fever, chills, swelling. Tolerated walk around the block yesterday. Wearing abdominal binder and abiding by lifting restictions. Weaning from pain meds. Only taking 1 before bed.    A:  Sounds like normal post-op recovery. Reinforced lifting restrictions. Reviewed s/s infection and when to call back.    R:  Patient verbalizes understanding and will call back if needed.    Lizette Sanches RN  [facility]  [phone]  8/2/2019 10:13 AM

## 2019-08-02 NOTE — TELEPHONE ENCOUNTER
Reason for Call:  Other appointment    Detailed comments: Patient calling because he's concerned about his incision area and the redness surrounding it. Please call him back    Phone Number Patient can be reached at: Home number on file [phone] (home)    Best Time: any    Can we leave a detailed message on this number? YES    Call taken on 8/2/2019 at 9:56 AM by Lizette Silveira

## 2019-08-09 ENCOUNTER — OFFICE VISIT (OUTPATIENT)
Dept: SURGERY | Facility: OTHER | Age: 54
End: 2019-08-09
Payer: COMMERCIAL

## 2019-08-09 VITALS
TEMPERATURE: 97.7 F | HEIGHT: 74 IN | BODY MASS INDEX: 35.04 KG/M2 | SYSTOLIC BLOOD PRESSURE: 138 MMHG | WEIGHT: 273 LBS | DIASTOLIC BLOOD PRESSURE: 82 MMHG

## 2019-08-09 DIAGNOSIS — E66.9 OBESITY (BMI 35.0-39.9 WITHOUT COMORBIDITY): ICD-10-CM

## 2019-08-09 DIAGNOSIS — Z09 S/P UMBILICAL HERNIA REPAIR, FOLLOW-UP EXAM: Primary | ICD-10-CM

## 2019-08-09 PROBLEM — E66.01 MORBID OBESITY (H): Status: ACTIVE | Noted: 2019-08-09

## 2019-08-09 PROCEDURE — 99024 POSTOP FOLLOW-UP VISIT: CPT | Performed by: SURGERY

## 2019-08-09 ASSESSMENT — PAIN SCALES - GENERAL: PAINLEVEL: MILD PAIN (3)

## 2019-08-09 ASSESSMENT — MIFFLIN-ST. JEOR: SCORE: 2153.07

## 2019-08-09 NOTE — LETTER
"    8/9/2019         RE: Trung Dubois  [id number] [facility] [address]        Dear Colleague,    Thank you for referring your patient, Trung Dubois, to the [facility]. Please see a copy of my visit note below.    [facility] FOLLOW-UP NOTE  GENERAL SURGERY    PCP: Sangeetha Salazar         Assessment and Plan:   Assessment:   Trung Dubois is a 54 year old male who presented post operatively from Robotic assisted umbilical hernia with mesh 7/25/19 and is doing very well.       ICD-10-CM    1. S/P umbilical hernia repair, follow-up exam Z09    2. Obesity (BMI 35.0-39.9 without comorbidity) E66.9        Plan:  Patient is doing well overall.  No pain around the umbilicus.  Most pain is at the left lower quadrant incision.  No recurrent hernia.  No bruising.  Incisions clean dry and intact.  Patient is okay to go back to work a few days early after Labor Day.  He understands there is no lifting for a good 6 weeks from the date of surgery.  He does not need additional pain medication.  Does not need additional work note.           Subjective:     Trung Dubois is a 54 year old male who presents post operatively from Robotic assisted umbilical hernia with mesh 7/25/19. Pain has been controled. Currently is not using pain medications. Eating a Regular and having regular bladder/bowel function. Overall doing very well.           Objective:       /82   Temp 97.7  F (36.5  C) (Temporal)   Ht 1.88 m (6' 2\")   Wt 123.8 kg (273 lb)   BMI 35.05 kg/m      Constitutional: Awake, alert, in no acute distress.  Respiratory: Non-labored.   Cardiovascular: Regular rate and rhythm.   Abdomen: soft. Incision is Healing well.    TateCorin Hill DO  [facility]                Again, thank you for allowing me to participate in the care of your patient.        Sincerely,        TateCorin Hill MD    "

## 2019-08-09 NOTE — PROGRESS NOTES
"[facility] FOLLOW-UP NOTE  GENERAL SURGERY    PCP: Sangeetha Salazar         Assessment and Plan:   Assessment:   Trung Dubois is a 54 year old male who presented post operatively from Robotic assisted umbilical hernia with mesh 7/25/19 and is doing very well.       ICD-10-CM    1. S/P umbilical hernia repair, follow-up exam Z09    2. Obesity (BMI 35.0-39.9 without comorbidity) E66.9        Plan:  Patient is doing well overall.  No pain around the umbilicus.  Most pain is at the left lower quadrant incision.  No recurrent hernia.  No bruising.  Incisions clean dry and intact.  Patient is okay to go back to work a few days early after Labor Day.  He understands there is no lifting for a good 6 weeks from the date of surgery.  He does not need additional pain medication.  Does not need additional work note.           Subjective:     Trung Dubois is a 54 year old male who presents post operatively from Robotic assisted umbilical hernia with mesh 7/25/19. Pain has been controled. Currently is not using pain medications. Eating a Regular and having regular bladder/bowel function. Overall doing very well.           Objective:       /82   Temp 97.7  F (36.5  C) (Temporal)   Ht 1.88 m (6' 2\")   Wt 123.8 kg (273 lb)   BMI 35.05 kg/m     Constitutional: Awake, alert, in no acute distress.  Respiratory: Non-labored.   Cardiovascular: Regular rate and rhythm.   Abdomen: soft. Incision is Healing well.    [facility], DO  [facility]              "

## 2019-08-13 ENCOUNTER — TELEPHONE (OUTPATIENT)
Dept: SURGERY | Facility: CLINIC | Age: 54
End: 2019-08-13

## 2019-08-13 NOTE — TELEPHONE ENCOUNTER
Reason for Call:  Other appointment    Detailed comments: Patient had surgery on 07/25 and he's having a steady dull pain on his left side. Wondering if this is do with the surgery or if it's something new? Please advise.     Phone Number Patient can be reached at: Home number on file [phone] (home)    Best Time: any    Can we leave a detailed message on this number? YES    Call taken on 8/13/2019 at 12:52 PM by Lizette Silveira

## 2019-08-13 NOTE — TELEPHONE ENCOUNTER
Spoke with patient who states that he feels like he over did it while painting his lino room this weekend. He notes a dull constant ache on his left side near the incisions.  The incisions themselves are doing great.  Denies any abdominal swelling or other symptoms just the dull ache - mostly with movement.  He has not been using any OTC or at home treatments. He will start but decreasing his activity a little, icing, and using NSAIDs.  He was educated on the s/s that would warrant seeking emergent medical care.     Sully NOLASCO RN. . .  8/13/2019, 1:16 PM

## 2019-08-19 ENCOUNTER — TELEPHONE (OUTPATIENT)
Dept: SURGERY | Facility: CLINIC | Age: 54
End: 2019-08-19

## 2019-08-19 NOTE — TELEPHONE ENCOUNTER
Nursing Note    D:  S/p umbilical hernia repair 7/25/19. Patient was instructed to no lifting for a good 6 weeks from surgery.   Patient says he's doing well and wearing an abdominal brace when walking around and working. This weekend, he mowed lawn and worked on the lawn. Today feels fine.    Patient requesting: restrictions be lifted so he can go back to work. Patient would like to work a few days the week of 8/26/19 (as shorter trial days), then return to normal duties 9/3/19. Patient says he's a busy body and wants to go back.    A:  Routing to provider for review.    R:  Patient verbalizes understanding and will call back if needed.    Lizette Sanches RN  [facility]  [phone]  8/19/2019 8:54 AM

## 2019-08-19 NOTE — TELEPHONE ENCOUNTER
PT called and would like to talk to someone about this again. He would also like to know if he can go back to work a week early.

## 2019-08-19 NOTE — TELEPHONE ENCOUNTER
After touching base with Dr. Hill, she ok'd patient to go back to work but was very clear on the restrictions of no lifting over 15 lbs x 6 weeks. Patient informed and agreed to abide by the restrictions. 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 Patient doesn't need a letter or anything from us.    Lizette Sanches RN on 8/19/2019 at 11:11 AM

## 2019-08-26 ENCOUNTER — TELEPHONE (OUTPATIENT)
Dept: NURSING | Facility: CLINIC | Age: 54
End: 2019-08-26

## 2019-08-26 NOTE — TELEPHONE ENCOUNTER
Letter written and faxed to [facility] per patient request at [phone].     Sully NOLASCO RN. . .  8/26/2019, 1:21 PM

## 2019-08-26 NOTE — TELEPHONE ENCOUNTER
Reason for call:  Other   Patient called regarding (reason for call): call back  Additional comments: Patient called this am at 6:45 and talked to a switch board person and he wanted to leave a message for Dr. Hill. Patient would like to go back to work on 09/03/19 instead of 09/05/19 like Dr. Hill suggested.    Phone number to reach patient:  Cell number on file:    Telephone Information:   Mobile [phone]       Best Time:  ANY    Can we leave a detailed message on this number?  YES

## 2019-08-26 NOTE — LETTER
[address] [address]  [phone]          2019    RE: Trung Dubois  : 1965      To whom it may concern:     Patient may return to work on 2019 with the following restrictions: Ease back into working - start with 8 hour work days for a few days, avoid heavy lifting until approximately 6 weeks post-op (d.o.s. was 19 so 6 weeks would be 2019 ). Continue to wear supportive brace.    Please let me know if you have any questions or concerns.       Sincerely,             Dr. Hill

## 2019-08-26 NOTE — TELEPHONE ENCOUNTER
Per RN-letter faxed to [facility] at [phone], number provided by RN................Yeni Motta CMA  ([facility])

## 2019-09-14 ENCOUNTER — NURSE TRIAGE (OUTPATIENT)
Dept: NURSING | Facility: CLINIC | Age: 54
End: 2019-09-14

## 2019-09-15 NOTE — TELEPHONE ENCOUNTER
"lazara is  7 weeks post umbilical hernia repair; had raesumed normal work activity without problems   States today he did  More physical labor that he is used to  And abdominal muscles feel sore below his incisional site   No  funtional problems and  No fever or change in appearance of  Wound   Advised to  Take  OTC analgesia and arest   Be seen is symptoms worsen or persist after rest   Caller understand adilia Schuster RN  [facility]    Additional Information    Negative: Shock suspected (e.g., cold/pale/clammy skin, too weak to stand, low BP, rapid pulse)    Negative: Difficult to awaken or acting confused (e.g., disoriented, slurred speech)    Negative: Passed out (i.e., lost consciousness, collapsed and was not responding)    Negative: Sounds like a life-threatening emergency to the triager    Negative: Chest pain    Negative: Pain is mainly in upper abdomen  (if needed ask: \"is it mainly above the belly button?\")    Negative: Followed an abdomen (stomach) injury    Negative: [1] SEVERE pain (e.g., excruciating) AND [2] present > 1 hour    Negative: [1] SEVERE pain AND [2] age > 60    Negative: [1] Vomiting AND [2] contains red blood or black (\"coffee ground\") material  (Exception: few red streaks in vomit that only happened once)    Negative: Blood in bowel movements  (Exception: Blood on surface of BM with constipation)    Negative: Black or tarry bowel movements  (Exception: chronic-unchanged  black-grey bowel movements AND is taking iron pills or Pepto-bismol)    Negative: [1] Unable to urinate (or only a few drops) > 4 hours AND [2] bladder feels very full (e.g., palpable bladder or strong urge to urinate)    Negative: [1] Pain in the scrotum or testicle AND [2] present > 1 hour    Negative: Patient sounds very sick or weak to the triager    Negative: [1] MILD-MODERATE pain AND [2] constant AND [3] present > 2 hours    Negative: [1] Vomiting AND [2] abdomen looks much more swollen than usual    " Negative: [1] Vomiting AND [2] contains bile (green color)    Negative: White of the eyes have turned yellow (i.e., jaundice)    Negative: Fever > 103 F (39.4 C)    Negative: [1] Fever > 101 F (38.3 C) AND [2] age > 60    Negative: [1] Fever > 100.0 F (37.8 C) AND [2] bedridden (e.g., nursing home patient, CVA, chronic illness, recovering from surgery)    Negative: [1] Fever > 100.0 F (37.8 C) AND [2] diabetes mellitus or weak immune system (e.g., HIV positive, cancer chemo, splenectomy, chronic steroids)    Negative: [1] SEVERE pain AND [2] present < 1 hour    Negative: [1] MODERATE pain (e.g., interferes with normal activities) AND [2] pain comes and goes (cramps) AND [3] present > 24 hours  (Exception: pain with Vomiting or Diarrhea - see that Guideline)    Negative: [1] MILD pain (e.g., does not interfere with normal activities) AND [2] pain comes and goes (cramps) [3] present > 48 hours    Negative: Age > 60 years    Negative: Blood in urine (red, pink, or tea-colored)    Negative: Abdominal pain is a chronic symptom (recurrent or ongoing AND present > 4 weeks)    [1] MILD-MODERATE pain AND [2] constant and [3] present < 2 hours    Other post-op symptom or question    Protocols used: ABDOMINAL PAIN - MALE-A-, POST-OP SYMPTOMS AND [id number]

## 2019-10-29 ENCOUNTER — HEALTH MAINTENANCE LETTER (OUTPATIENT)
Age: 54
End: 2019-10-29

## 2019-12-05 ENCOUNTER — NURSE TRIAGE (OUTPATIENT)
Dept: NURSING | Facility: CLINIC | Age: 54
End: 2019-12-05

## 2019-12-05 DIAGNOSIS — K21.9 GASTROESOPHAGEAL REFLUX DISEASE, ESOPHAGITIS PRESENCE NOT SPECIFIED: ICD-10-CM

## 2019-12-05 RX ORDER — PANTOPRAZOLE SODIUM 20 MG/1
TABLET, DELAYED RELEASE ORAL
Qty: 90 TABLET | Refills: 1 | Status: SHIPPED | OUTPATIENT
Start: 2019-12-05 | End: 2023-01-30

## 2019-12-05 NOTE — TELEPHONE ENCOUNTER
[facility] Protonix refill needed.  Pharmacy:  [facility] [phone].  Epic encounter sent to the patient's clinic.    Molly Avery RN-[facility]

## 2019-12-05 NOTE — TELEPHONE ENCOUNTER
[facility] Protonix refill needed.  Pharmacy:  [facility] [phone].  Molly Avery RN-[facility]
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
## 2020-01-06 ENCOUNTER — OFFICE VISIT (OUTPATIENT)
Dept: FAMILY MEDICINE | Facility: CLINIC | Age: 55
End: 2020-01-06
Payer: COMMERCIAL

## 2020-01-06 VITALS
HEIGHT: 73 IN | OXYGEN SATURATION: 99 % | RESPIRATION RATE: 16 BRPM | WEIGHT: 277.8 LBS | DIASTOLIC BLOOD PRESSURE: 78 MMHG | BODY MASS INDEX: 36.82 KG/M2 | SYSTOLIC BLOOD PRESSURE: 122 MMHG | HEART RATE: 71 BPM | TEMPERATURE: 97.9 F

## 2020-01-06 DIAGNOSIS — K21.9 GASTROESOPHAGEAL REFLUX DISEASE WITHOUT ESOPHAGITIS: ICD-10-CM

## 2020-01-06 DIAGNOSIS — F39 MOOD DISORDER (H): ICD-10-CM

## 2020-01-06 DIAGNOSIS — E66.01 MORBID OBESITY (H): ICD-10-CM

## 2020-01-06 DIAGNOSIS — G47.33 OSA (OBSTRUCTIVE SLEEP APNEA): ICD-10-CM

## 2020-01-06 DIAGNOSIS — Z00.00 ROUTINE GENERAL MEDICAL EXAMINATION AT A HEALTH CARE FACILITY: Primary | ICD-10-CM

## 2020-01-06 LAB
CHOLEST SERPL-MCNC: 201 MG/DL
GLUCOSE SERPL-MCNC: 94 MG/DL (ref 70–99)
HDLC SERPL-MCNC: 45 MG/DL
HGB BLD-MCNC: 14.7 G/DL (ref 13.3–17.7)
LDLC SERPL CALC-MCNC: 121 MG/DL
NONHDLC SERPL-MCNC: 156 MG/DL
TRIGL SERPL-MCNC: 175 MG/DL
TSH SERPL DL<=0.005 MIU/L-ACNC: 2.08 MU/L (ref 0.4–4)

## 2020-01-06 PROCEDURE — 85018 HEMOGLOBIN: CPT | Performed by: NURSE PRACTITIONER

## 2020-01-06 PROCEDURE — G0103 PSA SCREENING: HCPCS | Performed by: NURSE PRACTITIONER

## 2020-01-06 PROCEDURE — 36415 COLL VENOUS BLD VENIPUNCTURE: CPT | Performed by: NURSE PRACTITIONER

## 2020-01-06 PROCEDURE — 80061 LIPID PANEL: CPT | Performed by: NURSE PRACTITIONER

## 2020-01-06 PROCEDURE — 84443 ASSAY THYROID STIM HORMONE: CPT | Performed by: NURSE PRACTITIONER

## 2020-01-06 PROCEDURE — 99396 PREV VISIT EST AGE 40-64: CPT | Performed by: NURSE PRACTITIONER

## 2020-01-06 PROCEDURE — 82947 ASSAY GLUCOSE BLOOD QUANT: CPT | Performed by: NURSE PRACTITIONER

## 2020-01-06 ASSESSMENT — ENCOUNTER SYMPTOMS
CHILLS: 0
MYALGIAS: 1
EYE PAIN: 0
FEVER: 0
DIARRHEA: 0
NERVOUS/ANXIOUS: 0
SHORTNESS OF BREATH: 0
PALPITATIONS: 0
HEMATOCHEZIA: 0
DYSURIA: 0
HEMATURIA: 0
ABDOMINAL PAIN: 0
COUGH: 0
SORE THROAT: 0
PARESTHESIAS: 0
FREQUENCY: 0
DIZZINESS: 0
ARTHRALGIAS: 1
CONSTIPATION: 0
NAUSEA: 0
JOINT SWELLING: 0
HEARTBURN: 1
HEADACHES: 0

## 2020-01-06 ASSESSMENT — ANXIETY QUESTIONNAIRES
GAD7 TOTAL SCORE: 6
6. BECOMING EASILY ANNOYED OR IRRITABLE: NOT AT ALL
3. WORRYING TOO MUCH ABOUT DIFFERENT THINGS: SEVERAL DAYS
7. FEELING AFRAID AS IF SOMETHING AWFUL MIGHT HAPPEN: SEVERAL DAYS
2. NOT BEING ABLE TO STOP OR CONTROL WORRYING: SEVERAL DAYS
4. TROUBLE RELAXING: SEVERAL DAYS
5. BEING SO RESTLESS THAT IT IS HARD TO SIT STILL: SEVERAL DAYS
GAD7 TOTAL SCORE: 6
1. FEELING NERVOUS, ANXIOUS, OR ON EDGE: SEVERAL DAYS
7. FEELING AFRAID AS IF SOMETHING AWFUL MIGHT HAPPEN: SEVERAL DAYS
GAD7 TOTAL SCORE: 6

## 2020-01-06 ASSESSMENT — PATIENT HEALTH QUESTIONNAIRE - PHQ9
10. IF YOU CHECKED OFF ANY PROBLEMS, HOW DIFFICULT HAVE THESE PROBLEMS MADE IT FOR YOU TO DO YOUR WORK, TAKE CARE OF THINGS AT HOME, OR GET ALONG WITH OTHER PEOPLE: SOMEWHAT DIFFICULT
SUM OF ALL RESPONSES TO PHQ QUESTIONS 1-9: 6
SUM OF ALL RESPONSES TO PHQ QUESTIONS 1-9: 6

## 2020-01-06 ASSESSMENT — MIFFLIN-ST. JEOR: SCORE: 2158.97

## 2020-01-07 LAB — PSA SERPL-ACNC: 1.47 UG/L (ref 0–4)

## 2020-01-07 ASSESSMENT — ANXIETY QUESTIONNAIRES: GAD7 TOTAL SCORE: 6

## 2020-01-07 ASSESSMENT — PATIENT HEALTH QUESTIONNAIRE - PHQ9: SUM OF ALL RESPONSES TO PHQ QUESTIONS 1-9: 6

## 2020-01-26 ENCOUNTER — NURSE TRIAGE (OUTPATIENT)
Dept: NURSING | Facility: CLINIC | Age: 55
End: 2020-01-26

## 2020-01-26 NOTE — TELEPHONE ENCOUNTER
"Patient states injured his Right hand today while working on his car.  States was pulling pieces apart and back of Right hand \"hit hard on a surface.\"  States Right \"pinky\" and base of pinky had initial swelling and redness.   Reports \"tingling\" in Right pinky and decreased range of motion.    Currently states has applied ice and area involved is \"3/4 inches in size.\"  No open wound. States pain now is \"2-3\" on a 1-10 pain scale.  Reports decreased swelling and redness now.   Afebrile.  States can make a fist but \"not a tight fist.\"  Reports is up to date on tetanus immunization.    Protocol-  Hand & Wrist Injury- Adult  Care advice reviewed.   Disposition-  Home Care  Caller states understanding of the recommended disposition.   Advised to call back if further questions or concerns.     TONY Pendleton RN  [facility]     Additional Information    Negative: Serious injury with multiple fractures    Negative: [1] Major bleeding (e.g., actively dripping or spurting) AND [2] can't be stopped    Negative: Amputation    Negative: Sounds like a life-threatening emergency to the triager    Negative: Bullet wound, stabbed by knife, or other serious penetrating wound    Negative: Looks like a broken bone (e.g., knuckle is gone or depressed)    Negative: Looks like a dislocated joint (e.g., crooked or deformed)    Negative: Cut over knuckle (MCP joint)    Negative: Skin is split open or gaping  (or length > 1/2 inch or 12 mm)    Negative: [1] Bleeding AND [2] won't stop after 10 minutes of direct pressure (using correct technique)    Negative: [1] Dirt in the wound AND [2] not removed with 15 minutes of scrubbing    Negative: [1] Numbness (loss of sensation) of finger(s) AND [2] present now    Negative: High pressure injection injury (e.g., from grease gun or paint gun, usually work-related)    Negative: Sounds like a serious injury to the triager    Negative: [1] SEVERE pain AND [2] not improved 2 hours after " pain medicine/ice packs    Negative: [1] Large swelling or bruise (> 2 inches or 5 cm)    AND [2] can't use injured hand normally (e.g., make a fist, open fully, hold a glass of water)    Negative: Suspicious history for the injury    Negative: Can't use injured hand normally (e.g., make a fist, open fully, hold a glass of water)    Negative: Large swelling or bruise (> 2 inches or 5 cm)    Negative: [1] High-risk adult (e.g., age > 60, osteoporosis, chronic steroid use) AND [2] still hurts    Negative: [1] Last tetanus shot > 5 years ago AND [2] DIRTY cut or scrape    Negative: [1] After 3 days AND [2] pain not improving    Negative: [1] After 2 weeks AND [2] still painful    Minor injury or bruising from direct blow    Protocols used: HAND AND WRIST INJURY-A-AH

## 2020-02-11 ENCOUNTER — NURSE TRIAGE (OUTPATIENT)
Dept: FAMILY MEDICINE | Facility: CLINIC | Age: 55
End: 2020-02-11

## 2020-02-11 NOTE — TELEPHONE ENCOUNTER
Spoke to patient.     Started with a cough, dry two nights ago.   Yesterday cold chills at night and throughout the day today.     States he is fatigued.   Takes warm showers to warm up.   99.6, and 103 earlier this afternoon  Most recent 99.3 under the tongue 30 minutes ago.     States he hasn't had a cold in ages, not ever like this.   Breathing is good.   Coughed up phlegm - yellow a few times.     RN advised home cares and to call back if symptoms worsen or fever persists, or if experiences difficulty breathing or shortness of breath. Patient states understanding.     Jasmin Conway RN BSN      Additional Information    Negative: Bluish (or gray) lips or face    Negative: Severe difficulty breathing (e.g., struggling for each breath, speaks in single words)    Negative: Rapid onset of cough and has hives    Negative: Coughing started suddenly after medicine, an allergic food or bee sting    Negative: Difficulty breathing after exposure to flames, smoke, or fumes    Negative: Sounds like a life-threatening emergency to the triager    Negative: Previous asthma attacks and this feels like asthma attack    Negative: Chest pain present when not coughing    Negative: Difficulty breathing    Negative: Passed out (i.e., fainted, collapsed and was not responding)    Negative: Patient sounds very sick or weak to the triager    Negative: Coughed up > 1 tablespoon (15 ml) blood (Exception: blood-tinged sputum)    Negative: Fever > 103 F (39.4 C)    Negative: Fever > 101 F (38.3 C) and over 60 years of age    Negative: Fever > 100.0 F (37.8 C) and has diabetes mellitus or a weak immune system (e.g., HIV positive, cancer chemotherapy, organ transplant, splenectomy, chronic steroids)    Negative: Fever > 100.0 F (37.8 C) and bedridden (e.g., nursing home patient, stroke, chronic illness, recovering from surgery)    Negative: Increasing ankle swelling    Negative: Wheezing is present    Negative: SEVERE coughing spells (e.g.,  whooping sound after coughing, vomiting after coughing)    Negative: Coughing up mary-colored (reddish-brown) or blood-tinged sputum    Negative: Fever present > 3 days (72 hours)    Negative: Fever returns after gone for over 24 hours and symptoms worse or not improved    Negative: Using nasal washes and pain medicine > 24 hours and sinus pain persists    Negative: Known COPD or other severe lung disease (i.e., bronchiectasis, cystic fibrosis, lung surgery) and worsening symptoms (i.e., increased sputum purulence or amount, increased breathing difficulty)    Negative: Continuous (nonstop) coughing interferes with work or school and no improvement using cough treatment per Care Advice    Negative: Patient wants to be seen    Negative: Cough has been present for > 3 weeks    Negative: Allergy symptoms are also present (e.g., itchy eyes, clear nasal discharge, postnasal drip)    Negative: Nasal discharge present > 10 days    Negative: Exposure to TB (Tuberculosis)    Negative: Taking an ACE Inhibitor medication (e.g., benazepril/LOTENSIN, captopril/CAPOTEN, enalapril/VASOTEC, lisinopril/ZESTRIL)    Negative: Severe difficulty breathing (e.g., struggling for each breath, speaks in single words)    Negative: Very weak (can't stand)    Negative: Sounds like a life-threatening emergency to the triager    Negative: Runny nose is caused by pollen or other allergies    Negative: Cough is the main symptom    Negative: Sore throat is the main symptom    Negative: Patient sounds very sick or weak to the triager    Negative: Fever > 103 F (39.4 C)    Negative: Fever > 101 F (38.3 C) and over 60 years of age    Negative: Fever > 100.0 F (37.8 C) and has diabetes mellitus or a weak immune system (e.g., HIV positive, cancer chemotherapy, organ transplant, splenectomy, chronic steroids)    Negative: Fever > 100.0 F (37.8 C) and bedridden (e.g., nursing home patient, stroke, chronic illness, recovering from surgery)    Negative:  Fever present > 3 days (72 hours)    Negative: Fever returns after gone for over 24 hours and symptoms worse or not improved    Negative: Sinus pain (not just congestion) and fever    Negative: Earache    Negative: Sinus congestion (pressure, fullness) present > 10 days    Negative: Nasal discharge present > 10 days    Negative: Using nasal washes and pain medicine > 24 hours and sinus pain (lower forehead, cheekbone, or eye) persists    Negative: Patient wants to be seen    Negative: Sore throat present > 5 days    Colds with no complications    Protocols used: COUGH-A-OH, COMMON COLD-A-OH

## 2020-02-12 ENCOUNTER — TELEPHONE (OUTPATIENT)
Dept: FAMILY MEDICINE | Facility: CLINIC | Age: 55
End: 2020-02-12

## 2020-02-12 NOTE — LETTER
[facility]  [address]  [place] [address]  [phone]          February 12, 2020    Re: Trung Dubois                                                                                                                                 To whom it may concern,    Trung called in to the clinic yesterday 2/11/2020 to discuss symptoms with an RN.          Sincerely,         Sangeetha Salazar DNP      [place]

## 2020-02-12 NOTE — TELEPHONE ENCOUNTER
Reason for Call:  Other     Detailed comments: patient requesting note for work stating he has a cold and he had spoke with a nurse about this. Fax [phone] grzegorz Hodgson    Phone Number Patient can be reached at: Home number on file [phone] (home)    Best Time: any    Can we leave a detailed message on this number? YES    Call taken on 2/12/2020 at 2:56 PM by Bibi Ortiz

## 2020-02-13 ENCOUNTER — OFFICE VISIT (OUTPATIENT)
Dept: FAMILY MEDICINE | Facility: CLINIC | Age: 55
End: 2020-02-13
Payer: COMMERCIAL

## 2020-02-13 ENCOUNTER — NURSE TRIAGE (OUTPATIENT)
Dept: FAMILY MEDICINE | Facility: CLINIC | Age: 55
End: 2020-02-13

## 2020-02-13 VITALS
SYSTOLIC BLOOD PRESSURE: 100 MMHG | BODY MASS INDEX: 34.87 KG/M2 | DIASTOLIC BLOOD PRESSURE: 72 MMHG | WEIGHT: 271.7 LBS | TEMPERATURE: 97.9 F | HEART RATE: 68 BPM | RESPIRATION RATE: 16 BRPM | OXYGEN SATURATION: 98 % | HEIGHT: 74 IN

## 2020-02-13 DIAGNOSIS — J11.1 INFLUENZA-LIKE ILLNESS: Primary | ICD-10-CM

## 2020-02-13 PROCEDURE — 99213 OFFICE O/P EST LOW 20 MIN: CPT | Performed by: NURSE PRACTITIONER

## 2020-02-13 ASSESSMENT — MIFFLIN-ST. JEOR: SCORE: 2145.55

## 2020-02-13 ASSESSMENT — PAIN SCALES - GENERAL: PAINLEVEL: NO PAIN (0)

## 2020-02-13 NOTE — TELEPHONE ENCOUNTER
Spoke with patient.    All started on Monday, coughing Tuesday.  In and out of cold all day always. Ibuprofen is helping with the chills.    Had called into work as he is uncomfortable driving.  States that being out side make his breathing worse.  Hurts with deep breathing.  Inside is better.   Wondering if he just need to stay home and let it run its course. Advised he could do that but if he needed a note we would need to see him and if breathing worsens then he should be seen.    Would like the appointment.    Next 5 appointments (look out 90 days)    Feb 13, 2020  1:20 PM CST  Office Visit with DAWOOD Altamirano CNP  [facility] [address]  [facility] [address] [phone]        Home Care Advised:   See Care Advice section in Epic    Karri Bravo RN, BSN

## 2020-02-13 NOTE — PROGRESS NOTES
Subjective     Trung Dubois is a 54 year old male who presents to clinic today for the following health issues:    HPI   Acute Illness   Acute illness concerns: cough sore throat and fever  Onset: Monday night (2.5-3 days ago)    Fever: YES    Chills/Sweats: YES    Headache (location?): YES    Sinus Pressure:no    Conjunctivitis:  no    Ear Pain: no    Rhinorrhea: YES    Congestion: YES    Sore Throat: YES     Cough: YES    Wheeze: YES    Decreased Appetite: YES    Nausea: YES    Vomiting: no     Diarrhea:  no     Dysuria/Freq.: YES    Fatigue/Achiness: YES    Sick/Strep Exposure: no      Therapies Tried and outcome: water, Gatorade, ibuprofen   -Symptoms suddenly onset about 3 days ago, feels a cough, mild fever, congestion, cough, aches, fatigue. He went to work for the past two days but struggled driving because he felt fatigued, needed to pull over and take several naps. He stayed home from work today, is scheduled to go back tomorrow.        Patient Active Problem List   Diagnosis     CARDIOVASCULAR SCREENING; LDL GOAL LESS THAN 160     GERD (gastroesophageal reflux disease)     Mood disorder (H)     Suicidal ideation     Major depression     Generalized anxiety disorder     Obesity (BMI 35.0-39.9 without comorbidity)     FAUZIA (obstructive sleep apnea)     Obesity (BMI 35.0-39.9) with comorbidity (H)     Past Surgical History:   Procedure Laterality Date     HERNIA REPAIR, UMBILICAL  07/25/2019     LASIK      left eye only     TONSILLECTOMY      AGE 5       Social History     Tobacco Use     Smoking status: Never Smoker     Smokeless tobacco: Never Used   Substance Use Topics     Alcohol use: Yes     Comment: 1-2 drinks per week      Family History   Problem Relation Age of Onset     Diabetes Mother      Leukemia Mother      Psychotic Disorder Father         Depression/suicide     Family History Negative Other      Depression Brother         Suicide/Bipolar     Asthma No family hx of      C.A.D. No family  hx of      Hypertension No family hx of      Cerebrovascular Disease No family hx of      Breast Cancer No family hx of      Cancer - colorectal No family hx of      Prostate Cancer No family hx of      Alcohol/Drug No family hx of      Allergies No family hx of      Alzheimer Disease No family hx of      Anesthesia Reaction No family hx of      Blood Disease No family hx of      Arthritis No family hx of      Cancer No family hx of      Cardiovascular No family hx of      Circulatory No family hx of      Congenital Anomalies No family hx of      Connective Tissue Disorder No family hx of          Current Outpatient Medications   Medication Sig Dispense Refill     pantoprazole (PROTONIX) 20 MG EC tablet TAKE 1 TAB BY MOUTH ONCE DAILY, prn  30-60 MINUTES BEFORE A MEAL 90 tablet 1     Allergies   Allergen Reactions     Prevacid [Lansoprazole] Nausea     Recent Labs   Lab Test 01/06/20  1511 12/14/18  0843 12/07/17  1531 07/17/16  1205 06/29/15  1756 07/19/14  0808   * 122* 105* 122* 123 106   HDL 45 41 41 41  --  31*   TRIG 175* 113 226* 98  --  175*   ALT  --   --   --   --  28 37   CR  --   --   --  0.98 1.03 1.06   GFRESTIMATED  --   --   --  81 77 75   GFRESTBLACK  --   --   --  >90   GFR Calc   >90   GFR Calc   >90   POTASSIUM  --   --   --  4.2 4.0 4.2   TSH 2.08 1.49  --   --   --  1.35      BP Readings from Last 3 Encounters:   02/13/20 100/72   01/06/20 122/78   08/09/19 138/82    Wt Readings from Last 3 Encounters:   02/13/20 123.2 kg (271 lb 11.2 oz)   01/06/20 126 kg (277 lb 12.8 oz)   08/09/19 123.8 kg (273 lb)                      Reviewed and updated as needed this visit by Provider  Tobacco  Allergies  Meds  Problems  Med Hx  Surg Hx  Fam Hx         Review of Systems   ROS COMP: Constitutional, HEENT, cardiovascular, pulmonary, gi and gu systems are negative, except as otherwise noted.    This document serves as a record of the services and decisions  "personally performed by DANILO CERVANTES. It was created on his/her behalf by Valerie Bliss, a trained medical scribe. The creation of this document is based on the provider's statements to the medical scribe. Valerie Bliss, February 13, 2020 1:34 PM      Objective    /72 (BP Location: Left arm, Patient Position: Sitting, Cuff Size: Adult Large)   Pulse 68   Temp 97.9  F (36.6  C) (Temporal)   Resp 16   Ht 1.877 m (6' 1.9\")   Wt 123.2 kg (271 lb 11.2 oz)   SpO2 98%   BMI 34.98 kg/m    Body mass index is 34.98 kg/m .  Physical Exam   GENERAL APPEARANCE: healthy, alert and no distress  EYES: Eyes grossly normal to inspection and conjunctivae and sclerae normal  HENT: ear canals and TM's normal, nose and mouth without ulcers or lesions and nasal mucosa edematous with moderate rhinorrhea  NECK: no adenopathy, no asymmetry, masses, or scars and thyroid normal to palpation  RESP: lungs clear to auscultation - no rales, rhonchi or wheezes  CV: regular rates and rhythm, normal S1 S2, no S3 or S4 and no murmur, click or rub         Assessment & Plan       ICD-10-CM    1. Influenza-like illness R69      Reviewed symptomatic management of symptoms. Patient education provided, including expected course of illness and symptoms that may occur which would require urgent evalution. Work note written to excuse from work for next few days. All questions answered. Patient understands and agrees with plan.     Patient Instructions   You can go back to work when you are fever free for 24 hours without medication (like ibuprofen).     Rest and push fluids.       Return in about 1 week (around 2/20/2020) for Recheck if symptoms worsen or fail to improve.    The information in this document, created by the medical scribe for me, accurately reflects the services I personally performed and the decisions made by me. I have reviewed and approved this document for accuracy.   DAWOOD Cody [facility]      "

## 2020-02-13 NOTE — TELEPHONE ENCOUNTER
Reason for call:  Patient reporting a symptom    Symptom or request: cold sx, shortness of breath    Duration (how long have symptoms been present): few days    Have you been treated for this before? No    Additional comments: Patient thinks cold may be turning into something worse. Put in schedule with KL for 1:20, but he is asking to speak with a nurse right now.     Phone Number patient can be reached at:  Home number on file [phone] (home)    Best Time:  any    Can we leave a detailed message on this number:  YES    Call taken on 2/13/2020 at 10:55 AM by Luisa Hernandez

## 2020-02-13 NOTE — PATIENT INSTRUCTIONS
You can go back to work when you are fever free for 24 hours without medication (like ibuprofen).     Rest and push fluids.

## 2020-02-13 NOTE — TELEPHONE ENCOUNTER
Additional Information    Negative: Bluish (or gray) lips or face    Negative: Severe difficulty breathing (e.g., struggling for each breath, speaks in single words)    Negative: Rapid onset of cough and has hives    Negative: Coughing started suddenly after medicine, an allergic food or bee sting    Negative: Difficulty breathing after exposure to flames, smoke, or fumes    Negative: Sounds like a life-threatening emergency to the triager    Negative: Previous asthma attacks and this feels like asthma attack    Negative: Chest pain present when not coughing    Negative: Difficulty breathing    Negative: Passed out (i.e., fainted, collapsed and was not responding)    Negative: Patient sounds very sick or weak to the triager    Negative: Coughed up > 1 tablespoon (15 ml) blood (Exception: blood-tinged sputum)    Negative: Fever > 103 F (39.4 C)    Negative: Fever > 101 F (38.3 C) and over 60 years of age    Negative: Fever > 100.0 F (37.8 C) and has diabetes mellitus or a weak immune system (e.g., HIV positive, cancer chemotherapy, organ transplant, splenectomy, chronic steroids)    Negative: Fever > 100.0 F (37.8 C) and bedridden (e.g., nursing home patient, stroke, chronic illness, recovering from surgery)    Negative: Increasing ankle swelling    Negative: Wheezing is present    Negative: SEVERE coughing spells (e.g., whooping sound after coughing, vomiting after coughing)    Negative: Coughing up mary-colored (reddish-brown) or blood-tinged sputum    Negative: Fever present > 3 days (72 hours)    Negative: Fever returns after gone for over 24 hours and symptoms worse or not improved    Negative: Using nasal washes and pain medicine > 24 hours and sinus pain persists    Negative: Known COPD or other severe lung disease (i.e., bronchiectasis, cystic fibrosis, lung surgery) and worsening symptoms (i.e., increased sputum purulence or amount, increased breathing difficulty)    Negative: Cough has been present for >  3 weeks    Negative: Allergy symptoms are also present (e.g., itchy eyes, clear nasal discharge, postnasal drip)    Negative: Nasal discharge present > 10 days    Negative: Exposure to TB (Tuberculosis)    Negative: Taking an ACE Inhibitor medication (e.g., benazepril/LOTENSIN, captopril/CAPOTEN, enalapril/VASOTEC, lisinopril/ZESTRIL)    Negative: Continuous (nonstop) coughing interferes with work or school and no improvement using cough treatment per Care Advice    Negative: Patient wants to be seen    Cough with cold symptoms (e.g., runny nose, postnasal drip, throat clearing)    Negative: Cough with no complications    Negative: Severe difficulty breathing (e.g., struggling for each breath, speaks in single words)    Negative: Bluish (or gray) lips or face    Negative: Shock suspected (e.g., cold/pale/clammy skin, too weak to stand, low BP, rapid pulse)    Negative: Sounds like a life-threatening emergency to the triager    Negative: Sounds like a cold and there is no fever    Negative: Cough and there is no fever    Negative: Severe cough    Negative: Throat pain and there is no fever    Negative: Severe sore throat    Negative: Influenza vaccine reaction is suspected    Negative: Headache and stiff neck (can't touch chin to chest)    Negative: Chest pain (EXCEPTION: MILD central chest pain, present only when coughing)    Negative: Difficulty breathing that is not severe and not relieved by cleaning out the nose    Negative: Patient sounds very sick or weak to the triager    Negative: Fever > 104 F (40 C)    Negative: Fever > 101 F (38.3 C) and over 60 years of age    Negative: Fever > 100.0 F (37.8 C) and diabetes mellitus or weak immune system (e.g., HIV positive, cancer chemo, splenectomy, organ transplant, chronic steroids)    Negative: Fever > 100.0 F (37.8 C) and bedridden (e.g., nursing home patient, stroke, chronic illness, recovering from surgery)    Negative: HIGH RISK (e.g., age > 64 years, pregnant,  HIV+, chronic medical condition) and flu symptoms    Negative: Using nasal washes and pain medicine > 24 hours and sinus pain (lower forehead, cheekbone, or eye) persists    Negative: Fever present > 3 days (72 hours)    Negative: Fever returns after gone for over 24 hours and symptoms worse (or not improved)    Negative: Earache    Probable influenza (fever) with no complications and not HIGH RISK    Negative: Nasal discharge present > 10 days    Negative: Cough present > 3 weeks    Negative: Patient requests antiviral medicine for influenza and flu symptoms present < 48 hours    Negative: Patient wants to be seen    Protocols used: COUGH-A-OH, INFLUENZA - SEASONAL-A-OH

## 2020-03-10 ENCOUNTER — TELEPHONE (OUTPATIENT)
Dept: FAMILY MEDICINE | Facility: CLINIC | Age: 55
End: 2020-03-10

## 2020-03-10 NOTE — TELEPHONE ENCOUNTER
Summary:    Patient is due/failing the following:   COLONOSCOPY    Action needed:   Schedule a colonoscopy or complete a FIT test    Type of outreach:    Sent [facility] message.    Questions for provider review:    None                                                                                                                                    Anupama Matamroos CMA       Chart routed to Care Team .

## 2020-04-02 ENCOUNTER — NURSE TRIAGE (OUTPATIENT)
Dept: NURSING | Facility: CLINIC | Age: 55
End: 2020-04-02

## 2020-04-03 NOTE — TELEPHONE ENCOUNTER
"Patient calling for Covid 19 sx and information. \"I was sen and dx with Influenza back in feb 2020, could I have had it?\" Patient denies any sx at this time. Gave home care advice, call back if needed.  COVID 19 Nurse Triage Plan/Patient Instructions    Please be aware that novel coronavirus (COVID-19) may be circulating in the community. If you develop symptoms such as fever, cough, or SOB or if you have concerns about the presence of another infection including coronavirus (COVID-19), please contact your health care provider or visit [email or web address].     Disposition/Instructions    Patient to stay at home and follow home care protocol based instructions.     Thank you for limiting contact with others, wearing a simple mask to cover your cough, practice good hand hygiene habits and accessing our virtual services where possible to limit the spread of this virus.    For more information about COVID19 and options for caring for yourself at home, please visit the CDC website at https://www.cdc.gov/coronavirus/2019-ncov/about/steps-when-sick.html  For more options for care at [facility], please visit our website at [email or web address]    For more information, please use the [place] Department of Health ([facility]) COVID-19 Hotlines (Interpreters available):     Health questions: Phone Number: [phone] or [phone] and Hours: 7 a.m. to 7 p.m.    Schools and  questions: Phone Number: [phone] or [phone] and Hours 7 a.m. to 7 p.m.                  Reason for Disposition    COVID -19, questions about    Protocols used: CORONAVIRUS (COVID-19) EXPOSURE-A- 3.30.20      "

## 2020-06-16 ENCOUNTER — TELEPHONE (OUTPATIENT)
Dept: FAMILY MEDICINE | Facility: CLINIC | Age: 55
End: 2020-06-16

## 2020-06-16 NOTE — TELEPHONE ENCOUNTER
Reason for Call:  Other Questions     Detailed comments: Patient is calling in today in regards to covid-19. Patient has called a few times today. Explained that we send everyone to [email or web address]. Patient stated that he doesn't have time for that and wants a doctor to call him. Patient stated that he works with someone who tested positive. Patient is wondering if he needs to get tested because he has a lot of vulnerable people around him and also is leaving on vacation. Thank you    Phone Number Patient can be reached at: Cell number on file:    Telephone Information:   Mobile [phone]       Best Time: any    Can we leave a detailed message on this number? YES    Call taken on 6/16/2020 at 9:46 AM by Ivy Ha

## 2020-06-16 NOTE — TELEPHONE ENCOUNTER
I spoke with the pt who states he was exposed to COVID last week from a co-worker last week. Thursday or Friday. States he was told he had Covid like symptoms last Feb but was not tested for Influenza or covid at that time. At this time pts without symptoms are not being tested. He states he may go to [facility] thru and see if he can get tested today and results by Friday. Offered appt but he declined.    Pt given these recommendations.     Stay home for 14 days.  Separate yourself from others, including in your home, as much as possible.  Watch for symptoms of COVID-19. If you develop symptoms, contact your health care provider about getting tested.  Wash your hands regularly for 20 seconds with soap and water.  Regularly disinfect commonly touched areas.    Audra Traore, MSN, RN

## 2020-06-16 NOTE — TELEPHONE ENCOUNTER
For testing, can have telephone appointment, or get general advice from RN. Visits are required to give advice on testing.   DAWOOD Cody CNP

## 2020-08-05 ENCOUNTER — TELEPHONE (OUTPATIENT)
Dept: FAMILY MEDICINE | Facility: CLINIC | Age: 55
End: 2020-08-05

## 2020-08-05 NOTE — TELEPHONE ENCOUNTER
Per RN Triage note -     Needs to be seen.  Would like call back when KL schedule opens in [place].  Lasted time of day needed.     RYAN TinocoN, RN, PHN         Left message asking patient to return call.  Please help him schedule an appt with KL at [place]

## 2020-08-12 ENCOUNTER — TELEPHONE (OUTPATIENT)
Dept: FAMILY MEDICINE | Facility: CLINIC | Age: 55
End: 2020-08-12

## 2020-08-12 ENCOUNTER — OFFICE VISIT (OUTPATIENT)
Dept: FAMILY MEDICINE | Facility: CLINIC | Age: 55
End: 2020-08-12
Payer: COMMERCIAL

## 2020-08-12 VITALS
HEART RATE: 70 BPM | DIASTOLIC BLOOD PRESSURE: 82 MMHG | TEMPERATURE: 98 F | SYSTOLIC BLOOD PRESSURE: 118 MMHG | RESPIRATION RATE: 12 BRPM

## 2020-08-12 DIAGNOSIS — M79.661 PAIN OF RIGHT LOWER LEG: ICD-10-CM

## 2020-08-12 DIAGNOSIS — M25.511 ACUTE PAIN OF RIGHT SHOULDER: Primary | ICD-10-CM

## 2020-08-12 PROCEDURE — 99214 OFFICE O/P EST MOD 30 MIN: CPT | Performed by: PHYSICIAN ASSISTANT

## 2020-08-12 NOTE — TELEPHONE ENCOUNTER
"Huddled with KP, agrees to see patient as PUI today if If his cough is his \"normal\" baseline cough without any change in feature/frequency AND if no other risks.    Called patient, left message to call back.  Deann Ahumada, RYANN, RN, PHN    "

## 2020-08-12 NOTE — TELEPHONE ENCOUNTER
Spoke to patient, cough is at his baseline, negative to other screening questions. Has not been at a large gathering.    Sounds like chronic right hip and knee clicking pain. Numbness in parts of the leg when standing for long periods. Newer right shoulder pain after doing something to it at work, but declines that this is a WC injury- intends to pay out of his pocket for any care needed.    Changed visit to f, instructed pt to arrive with a mask and call clinic from car upon arrival for telephonic registration. He verbalizes understanding of this plan.    Deann Ahumada, BSN, RN, PHN

## 2020-08-12 NOTE — TELEPHONE ENCOUNTER
Spoke to Melisa CARVALHO, who has assisted patient with rescheduling his hip pain visit from tomorrow to today. Patient reports chronic smoker's cough. Negative to rest of screening. No change to cough recently.  Instructed Melisa to change visit type to video for now. Will routed this to  to advise if ok to see f2f today instead, which is patient's preference.    Routed to .  RYAN CoyneN, RN, PHN

## 2020-08-12 NOTE — PROGRESS NOTES
"Subjective     Trung Dubois is a 55 year old male who presents to clinic today for the following health issues:    HPI       Joint Pain-Shoulder Pain RIGHT      Onset: 2 weeks   Was climbing on a dumpster on the back of his semi- and he slipped and caught himself and pulled his body weight on his right arm. Pain across upper scapula. Worse with holding steering wheel to drive or resting it on arm rest.   No N/T into hand.   Some right sided neck pain.  No weakness, F/C, CP, SOB, GI sx.     Description:   Location: right shoulder  Character: Dull ache    Intensity: mild    Progression of Symptoms: same    Accompanying Signs & Symptoms:  Other symptoms: none    History:   Previous similar pain: no       Precipitating factors:   Trauma or overuse: YES- as above    Alleviating factors:  Improved by: rest/inactivity    Therapies Tried and outcome:     Also wants to discuss low back and hip concerns - RIGHT  Pt reports low back pain chronically. Sees chiropractor every few weeks and it helps.   He notes if he leans against a wall or off loads pressure on his right leg, then when he does put pressure on it again his leg feels like \"jello\" and has a click in his hip (groin area). It is not a numb feeling or tingling feeling. He sometimes has shooting pain from back to leg. But not a frequent or long lasting sx. He has not been doing any home exercises. His chiro has given him some home exercises and he thinks when he does them his sx are better.         Patient Active Problem List   Diagnosis     CARDIOVASCULAR SCREENING; LDL GOAL LESS THAN 160     GERD (gastroesophageal reflux disease)     Mood disorder (H)     Suicidal ideation     Major depression     Generalized anxiety disorder     Obesity (BMI 35.0-39.9 without comorbidity)     FAUZIA (obstructive sleep apnea)     Obesity (BMI 35.0-39.9) with comorbidity (H)     Past Surgical History:   Procedure Laterality Date     HERNIA REPAIR, UMBILICAL  07/25/2019     STEPHANIE"    left eye only     TONSILLECTOMY      AGE 5       Social History     Tobacco Use     Smoking status: Never Smoker     Smokeless tobacco: Never Used   Substance Use Topics     Alcohol use: Yes     Comment: 1-2 drinks per week      Family History   Problem Relation Age of Onset     Diabetes Mother      Leukemia Mother      Psychotic Disorder Father         Depression/suicide     Family History Negative Other      Depression Brother         Suicide/Bipolar     Asthma No family hx of      C.A.D. No family hx of      Hypertension No family hx of      Cerebrovascular Disease No family hx of      Breast Cancer No family hx of      Cancer - colorectal No family hx of      Prostate Cancer No family hx of      Alcohol/Drug No family hx of      Allergies No family hx of      Alzheimer Disease No family hx of      Anesthesia Reaction No family hx of      Blood Disease No family hx of      Arthritis No family hx of      Cancer No family hx of      Cardiovascular No family hx of      Circulatory No family hx of      Congenital Anomalies No family hx of      Connective Tissue Disorder No family hx of          Current Outpatient Medications   Medication Sig Dispense Refill     pantoprazole (PROTONIX) 20 MG EC tablet TAKE 1 TAB BY MOUTH ONCE DAILY, prn  30-60 MINUTES BEFORE A MEAL 90 tablet 1     Allergies   Allergen Reactions     Prevacid [Lansoprazole] Nausea     BP Readings from Last 3 Encounters:   08/12/20 118/82   02/13/20 100/72   01/06/20 122/78    Wt Readings from Last 3 Encounters:   02/13/20 123.2 kg (271 lb 11.2 oz)   01/06/20 126 kg (277 lb 12.8 oz)   08/09/19 123.8 kg (273 lb)                    Reviewed and updated as needed this visit by Provider  Tobacco  Allergies  Meds  Problems  Med Hx  Surg Hx  Fam Hx  Soc Hx          Review of Systems   Constitutional, HEENT, cardiovascular, pulmonary, gi and gu systems are negative, except as otherwise noted.      Objective    /82   Pulse 70   Temp 98  F (36.7   C)   Resp 12   There is no height or weight on file to calculate BMI.  Physical Exam   GENERAL: healthy, alert and no distress  EYES: Eyes grossly normal to inspection, PERRL and conjunctivae and sclerae normal  NECK: no adenopathy, no asymmetry, masses, or scars and thyroid normal to palpation  RESP: lungs clear to auscultation - no rales, rhonchi or wheezes  CV: regular rate and rhythm, normal S1 S2, no S3 or S4, no murmur, click or rub, no peripheral edema and peripheral pulses strong  MS:  Shoulder: right: no obvious deformity, bruising, skin change. Tender to palpation cervical paraspinous muscles and along superior traezius. No focal pain of ant shoulder, clavicle, AC joint, biceps tendon. ROM full. Mild discomfort with forward flex, abduction past shoulder height but no loss of movement. Back normal. No discomfort with Neers and Jacob. Neg/normal empty can testing.  5/5. Sensation intact to light touch distally. Radial pulses symmetric.   C-spine: no midline or paracervical tenderness. normal ROM.  Lumbar Spine: No skin changes noted. Non-tender along lumbar muscles, SI, or  gluteal area or with pressure along posterior thigh. Not TTP into calf muscle.  Has normal forward bending to 90 degrees; normal twisting, lateral bending of back. No pain with back extension.  Is able to walk on heels and toes without difficulty.  Normal gait.   Strength 5/5 with leg extension, ankle ROM, and great toe flex/ext.  Straight leg raise normal. No edema. Pulses normal. Patellar reflexes 2+ and symmetric. Achilles reflexes 1-2+ symmetric. Sensation intact to light touch of LE.       Diagnostic Test Results:  none         Assessment & Plan     1. Acute pain of right shoulder  Rotator cuff strain  Ice or heat  Short course scheduled NSAIDs - take with food  Avoid overhead activity   Take PPI while taking the NSAIDs (normally takes prn)    2. Pain of right lower leg  Low back vs hip contributing. No consistent radicular  "sx. Normal exam.   He has had improvement in the past when he is doing home exercises from chiro consistently   Has family member who is a PT- he would like to get some exercises for lumbar spine and back.  Encouraged consistency with the home regimen if not helping, then f/u        BMI:   Estimated body mass index is 34.98 kg/m  as calculated from the following:    Height as of 2/13/20: 1.877 m (6' 1.9\").    Weight as of 2/13/20: 123.2 kg (271 lb 11.2 oz).   Weight management plan: Discussed healthy diet and exercise guidelines        Follow Up: The patient was instructed to contact clinic for worsening symptoms, non-improvement as expected/discussed, and for questions regarding medications or treatment plan. Discussed parameters for follow up and included in After Visit Summary given to patient.      Return in about 4 weeks (around 9/9/2020).    Liseth Mckeon PA-C  [facility]    "

## 2020-08-13 NOTE — PATIENT INSTRUCTIONS
Rotator cuff strain     Ibuprofen 600mg three times daily (every 8 hrs) with food for 5-7 days  Take your acid reflux medication while on this.     Ice, heat  Avoid over head activity    ---  Low back vs right hip - PT exercises  Follow up if not improving

## 2020-09-15 ENCOUNTER — NURSE TRIAGE (OUTPATIENT)
Dept: NURSING | Facility: CLINIC | Age: 55
End: 2020-09-15

## 2020-09-16 ENCOUNTER — OFFICE VISIT (OUTPATIENT)
Dept: FAMILY MEDICINE | Facility: CLINIC | Age: 55
End: 2020-09-16
Payer: COMMERCIAL

## 2020-09-16 VITALS
SYSTOLIC BLOOD PRESSURE: 132 MMHG | HEART RATE: 78 BPM | BODY MASS INDEX: 34.89 KG/M2 | WEIGHT: 271 LBS | OXYGEN SATURATION: 96 % | DIASTOLIC BLOOD PRESSURE: 86 MMHG | TEMPERATURE: 98.7 F | RESPIRATION RATE: 20 BRPM

## 2020-09-16 DIAGNOSIS — R35.0 URINARY FREQUENCY: Primary | ICD-10-CM

## 2020-09-16 LAB
ALBUMIN UR-MCNC: NEGATIVE MG/DL
APPEARANCE UR: CLEAR
BILIRUB UR QL STRIP: NEGATIVE
COLOR UR AUTO: YELLOW
GLUCOSE UR STRIP-MCNC: NEGATIVE MG/DL
HBA1C MFR BLD: 5.7 % (ref 0–5.6)
HGB UR QL STRIP: NEGATIVE
KETONES UR STRIP-MCNC: NEGATIVE MG/DL
LEUKOCYTE ESTERASE UR QL STRIP: NEGATIVE
MUCOUS THREADS #/AREA URNS LPF: PRESENT /LPF
NITRATE UR QL: NEGATIVE
PH UR STRIP: 6 PH (ref 5–7)
RBC #/AREA URNS AUTO: ABNORMAL /HPF
SOURCE: ABNORMAL
SP GR UR STRIP: >1.03 (ref 1–1.03)
UROBILINOGEN UR STRIP-ACNC: 0.2 EU/DL (ref 0.2–1)
WBC #/AREA URNS AUTO: ABNORMAL /HPF

## 2020-09-16 PROCEDURE — 83036 HEMOGLOBIN GLYCOSYLATED A1C: CPT | Performed by: FAMILY MEDICINE

## 2020-09-16 PROCEDURE — 80048 BASIC METABOLIC PNL TOTAL CA: CPT | Performed by: FAMILY MEDICINE

## 2020-09-16 PROCEDURE — 36415 COLL VENOUS BLD VENIPUNCTURE: CPT | Performed by: FAMILY MEDICINE

## 2020-09-16 PROCEDURE — 81001 URINALYSIS AUTO W/SCOPE: CPT | Performed by: FAMILY MEDICINE

## 2020-09-16 PROCEDURE — 99213 OFFICE O/P EST LOW 20 MIN: CPT | Performed by: FAMILY MEDICINE

## 2020-09-16 PROCEDURE — G0103 PSA SCREENING: HCPCS | Performed by: FAMILY MEDICINE

## 2020-09-16 NOTE — PATIENT INSTRUCTIONS
Patient Education     Overactive Bladder Syndrome (OAB)     Normally, urine stays in the bladder until a person decides to release it. With OAB, the bladder muscles contract involuntarily, causing a sudden urge to urinate and even urine leakage.   When the bladder muscle contract or squeeze involuntarily, it is called overactive bladder syndrome. This causes an intense urge to urinate, known as urgency. Urgency can occur many times during the day and night. If urine leaks with the urgency, it is called urge incontinence.  A disease that affects the bladder nerves, such as multiple sclerosis, can cause overactive bladder syndrome. Other conditions, such as urinary tract infection (UTI) or prostate problems in men, can also lead to OAB. But the exact cause is often not known.  How is overactive bladder syndrome diagnosed?  Your healthcare provider will examine you and ask about your symptoms and health history. You may also have one or more of the following:    Urine test to take samples of urine and have them checked for problems.    Urinary diary to record how much fluid you take in and urinate out in a 3 day period.    Bladder ultrasound to study the bladder as it empties. Ultrasound uses sound waves to create detailed images of the inside of the body.    Cystoscopy to allow the healthcare provider to look for problems in the urinary tract. The test uses a thin, flexible scope called a cystoscope with a light and camera on the end. The scope is inserted into the urethra (the tube that carries urine out of the body).    Urodynamic studies, a battery of tests designed to measure and record many aspects of urinary bladder function, including pressures, volume, and urine flow.  How is overactive bladder syndrome treated?  Treatment depends on the cause and severity of your OAB. Treatments may include the following:    Changing urination habits may be suggested. For instance, your healthcare provider may suggest that  you urinate as soon as you feel the urge. You may also need to limit how much fluid you have during the day.    Exercising your pelvic muscles can help strengthen muscles used during urination. These exercises are called Kegels. They involve liane as if you were stopping your urine stream and tightening your rectum as if trying not to pass gas. Your healthcare provider can help you learn how to do Kegels.    Biofeedback to help you learn to control the movement of your bladder muscles. Sensors are placed on your abdomen. They turn signals given off by your muscles into lines on a computer screen.    Medicine may be given to relax the bladder muscle. Medicine can also help ease bladder contractions, which reduces the urge to urinate.    Neuromodulation may be done if medicine and behavioral changes don t work. Electrical pulses are sent to the sacral nerves (nerves that affect the pelvic area). These pulses help relieve OAB and urge incontinence.    Surgery to make the bladder larger may be done in severe cases.  With treatment, OAB can be managed. A condition, such as UTI, that has caused you to have OAB will be treated. Treatment may involve taking medicine for months or years. You may also need to make changes in your daily routine. This may include going to the bathroom more often than you think you need to. Or, you may need to cut back on caffeine and alcohol because these can make symptoms worse. Your healthcare provider can tell you more.     When to call your healthcare provider  Call the healthcare provider right away if you have any of the following:    Fever of 100.4 F (38.0  C) or higher     No improvement with treatment    Trouble urinating because of pain    Back or abdominal pain   Date Last Reviewed: 1/1/2017 2000-2019 [employer]. [address] [place] [id number]. All rights reserved. This information is not intended as a substitute for professional medical care.  Always follow your healthcare professional's instructions.

## 2020-09-16 NOTE — TELEPHONE ENCOUNTER
Bob has been experiencing increased urination for the past 2-3 weeks.  He and his wife are concerned about possible diabetes and/or enlarged prostate    He reports:  - Getting up once during the night to urinate  - Going more frequently throughout the day.  - Increased urge to urinate.    Per protocol, advised to be seen within 24 hours.    Warm transferred to scheduling    COVID 19 Nurse Triage Plan/Patient Instructions    Please be aware that novel coronavirus (COVID-19) may be circulating in the community. If you develop symptoms such as fever, cough, or SOB or if you have concerns about the presence of another infection including coronavirus (COVID-19), please contact your health care provider or visit [email or web address].     Disposition/Instructions    In-Person Visit with provider recommended. Reference Visit Selection Guide.    Thank you for taking steps to prevent the spread of this virus.  o Limit your contact with others.  o Wear a simple mask to cover your cough.  o Wash your hands well and often.    Resources    [facility]: About COVID-19: [email or web address]    CDC: What to Do If You're Sick: www.cdc.gov/coronavirus/2019-ncov/about/steps-when-sick.html    CDC: Ending Home Isolation: www.cdc.gov/coronavirus/2019-ncov/hcp/disposition-in-home-patients.html     CDC: Caring for Someone: www.cdc.gov/coronavirus/2019-ncov/if-you-are-sick/care-for-someone.html     [facility]: Interim Guidance for Hospital Discharge to Home: www.health[facility]/diseases/coronavirus/hcp/hospdischarge.pdf    [facility] clinical trials (COVID-19 research studies): [email or web address]     Below are the COVID-19 hotlines at the [place] Department of [facility] ([facility]). Interpreters are available.   o For health questions: Call 102-047-7091 or 1-877.640.8860 (7 a.m. to 7 p.m.)  o For questions about schools and childcare: Call 991-607-5099 or 1-835.505.4119 (7 a.m. to 7 p.m.)     Maryann  SANNA Baker  [facility]      Additional Information    Negative: Shock suspected (e.g., cold/pale/clammy skin, too weak to stand, low BP, rapid pulse)    Negative: Sounds like a life-threatening emergency to the triager    Negative: [1] Unable to urinate (or only a few drops) > 4 hours AND     [2] bladder feels very full (e.g., palpable bladder or strong urge to urinate)    Negative: [1] Decreased urination and [2] drinking very little AND [2] dehydration suspected (e.g., dark urine, no urine > 12 hours, very dry mouth, very lightheaded)    Negative: Patient sounds very sick or weak to the triager    Negative: Fever > 100.5 F (38.1 C)    Negative: Side (flank) or lower back pain present    Negative: [1] Can't control passage of urine (i.e., urinary incontinence) AND [2] new onset (< 2 weeks) or worsening    Urinating more frequently than usual (i.e., frequency)    Protocols used: URINARY SYMPTOMS-A-AH

## 2020-09-16 NOTE — PROGRESS NOTES
Subjective     Trung Dubois is a 55 year old male who presents to clinic today for the following health issues:    HPI     Concern - Frequent Urination   Onset: 3 weeks   Description: Patient states that he has been having frequent urination, up in the middle of the night. Has been having cravings to drink things.  Intensity: moderate  Progression of Symptoms:  same and constant  Accompanying Signs & Symptoms: Pain In kidney area   Previous history of similar problem:   Precipitating factors:        Worsened by: NA  Alleviating factors:        Improved by: NA  Therapies tried and outcome:  none     Patient reports having increased urinary frequency over the last 3 weeks.  He tends quickly goes once in the morning, multiple times at work, and then multiple times at night waking him from sleep.  His wife became concerned due to this and encouraged him to come to the office for checkup.  He denies any urinary incontinence, hesitancy, dysuria, hematuria.  He does endorse decrease in stream.  He does believe he is completely emptying.    He also stated he wanted to drink more water recently and his wife became concerned for diabetes.    He is 1 Mountain Dew a day but no other caffeine use.  He does drink occasional alcohol 1 or 2 a day.    He denies any other associated symptoms.    Review of Systems   Constitutional, HEENT, lymph, derm, msk, cardiovascular, pulmonary, gi and gu systems are negative, except as otherwise noted.        Past medical, surgical, social, family history is reviewed.  Allergies reviewed.    Declines flu shot.    Objective    /86   Pulse 78   Temp 98.7  F (37.1  C) (Temporal)   Resp 20   Wt 122.9 kg (271 lb)   SpO2 96%   BMI 34.89 kg/m    Body mass index is 34.89 kg/m .  Physical Exam   General: Obese male. Appears well and in no acute distress.  Cardiovascular: Regular rate and rhythm, normal S1 and S2 without murmur. No extra heartsounds or friction rub. Radial pulses present  and equal bilaterally.  Respiratory: Lungs clear to auscultation bilaterally. No wheezing or crackles. No prolonged expiration. Symmetrical chest rise.  Musculoskeletal: No gross extremity deformities. No peripheral edema. Normal muscle bulk.  GI/Rectal: Soft, non-tender abdomen. No hepatosplenomegaly. Normal active bowel sounds.    Labs pending        Assessment & Plan   1. Urinary frequency: We will check UA for any evidence of infection or other cause.  Will check BMP and A1c for evidence of diabetes.  Patient has had intermittent elevated blood sugars in the past; however, most recently has been normal.  Additionally will check PSA.  This was normal earlier.  Had a documented normal ERICK with his physical this year.  Could also consider overactive bladder, BPH, diabetes etc.  Call when results are available.  Could trial oxybutynin or Flomax depending on findings or referral to urology for urodynamics..  - UA with Microscopic reflex to Culture  - Basic metabolic panel  (Ca, Cl, CO2, Creat, Gluc, K, Na, BUN)  - Hemoglobin A1c  - PSA, screen    Return in about 2 weeks (around 9/30/2020), or if symptoms worsen or fail to improve.    Murphy Hernandez MD  [facility]    This chart is completed utilizing dictation software; typos and/or incorrect word substitutions may unintentionally occur.

## 2020-09-17 PROBLEM — R39.9 LOWER URINARY TRACT SYMPTOMS (LUTS): Status: ACTIVE | Noted: 2020-09-17

## 2020-09-17 PROBLEM — R73.03 PREDIABETES: Status: ACTIVE | Noted: 2020-09-17

## 2020-09-17 LAB
ANION GAP SERPL CALCULATED.3IONS-SCNC: 6 MMOL/L (ref 3–14)
BUN SERPL-MCNC: 18 MG/DL (ref 7–30)
CALCIUM SERPL-MCNC: 9.2 MG/DL (ref 8.5–10.1)
CHLORIDE SERPL-SCNC: 106 MMOL/L (ref 94–109)
CO2 SERPL-SCNC: 27 MMOL/L (ref 20–32)
CREAT SERPL-MCNC: 1 MG/DL (ref 0.66–1.25)
GFR SERPL CREATININE-BSD FRML MDRD: 85 ML/MIN/{1.73_M2}
GLUCOSE SERPL-MCNC: 119 MG/DL (ref 70–99)
POTASSIUM SERPL-SCNC: 4.5 MMOL/L (ref 3.4–5.3)
PSA SERPL-ACNC: 1.69 UG/L (ref 0–4)
SODIUM SERPL-SCNC: 139 MMOL/L (ref 133–144)

## 2020-12-17 NOTE — PROGRESS NOTES
Subjective     Trung Dubois is a 55 year old male who presents to clinic today for the following health issues:    HPI       Patient has 3 skin tags that he would like looked it. The worse one ,the once that bleeds and gets the most irritated is on his right shoulder. This one he has had this one for a few years but over the last few weeks has increased in size drastically. He also has 4 under his right armpit.     Patient is self pay but will come in for his physical in feb when his new insurance kicks in.      Patient is asymptomatic of his blood pressure today.    He is currently not insured and wants to return in February.  He wants minimal intervention.  He is adamant about not sending the specimen to pathology.          Review of Systems   Constitutional, HEENT, cardiovascular, pulmonary, gi and gu systems are negative, except as otherwise noted.      Objective    BP (!) 149/75   Pulse 78   Temp 97.8  F (36.6  C)   Resp 16   Wt 121.4 kg (267 lb 9.6 oz)   SpO2 97%   BMI 34.45 kg/m    Body mass index is 34.45 kg/m .  Physical Exam   GENERAL: healthy, alert and no distress  MS: no gross musculoskeletal defects noted, no edema  SKIN: Approximately 4 skin tags noted under the right axilla.  Size ranges from 2 to 4 mm.  All are skin colored.  Skin lesion on right anterior shoulder.  It looks like a granuloma that has gotten excoriated.  Mild bleeding today.  Is red and skin colored at the base.  Measures approximately 4 mm across at the bases protrusive approximately 5 mm.      After informed consent and long discussion about pathology.  Patient does not want pathology on any of his samples.  Skin cleansing with Betadine and alcohol.  Anesthesia to all lesions with 1% lidocaine with epinephrine.  Shave excision of the right anterior shoulder lesion dermablade.  Skin tags removed with forceps and curved iris.  Hemostasis with Lumicain to axillary region.  Lumicain and silver nitrate used to right anterior  shoulder lesion.  Appropriate dressings applied.            Assessment & Plan     Trung was seen today for skin tags.    Diagnoses and all orders for this visit:    Skin tag  -     REMOVAL OF SKIN TAGS, FIRST 15    Elevated blood pressure reading without diagnosis of hypertension            Wound cares, warning signs discussed.  Appropriate dressing for home cares.  No pathology was sent.  Follow-up with blood pressure recheck in 2 weeks.  Patient will return in February when insured.    Return in about 2 weeks (around 1/5/2021) for BP Recheck.    DAWOOD Cody [facility]

## 2020-12-22 ENCOUNTER — OFFICE VISIT (OUTPATIENT)
Dept: FAMILY MEDICINE | Facility: CLINIC | Age: 55
End: 2020-12-22

## 2020-12-22 VITALS
TEMPERATURE: 97.8 F | RESPIRATION RATE: 16 BRPM | WEIGHT: 267.6 LBS | BODY MASS INDEX: 34.45 KG/M2 | OXYGEN SATURATION: 97 % | DIASTOLIC BLOOD PRESSURE: 75 MMHG | HEART RATE: 78 BPM | SYSTOLIC BLOOD PRESSURE: 149 MMHG

## 2020-12-22 DIAGNOSIS — L91.8 SKIN TAG: Primary | ICD-10-CM

## 2020-12-22 DIAGNOSIS — R03.0 ELEVATED BLOOD PRESSURE READING WITHOUT DIAGNOSIS OF HYPERTENSION: ICD-10-CM

## 2020-12-22 PROCEDURE — 11200 RMVL SKIN TAGS UP TO&INC 15: CPT | Performed by: NURSE PRACTITIONER

## 2020-12-22 PROCEDURE — 99207 PR DROP WITH A PROCEDURE: CPT | Performed by: NURSE PRACTITIONER

## 2020-12-31 ENCOUNTER — ALLIED HEALTH/NURSE VISIT (OUTPATIENT)
Dept: FAMILY MEDICINE | Facility: CLINIC | Age: 55
End: 2020-12-31

## 2020-12-31 VITALS — DIASTOLIC BLOOD PRESSURE: 80 MMHG | SYSTOLIC BLOOD PRESSURE: 126 MMHG | HEART RATE: 80 BPM

## 2020-12-31 DIAGNOSIS — I10 HTN (HYPERTENSION): Primary | ICD-10-CM

## 2020-12-31 PROCEDURE — 99207 PR NO CHARGE NURSE ONLY: CPT

## 2021-01-15 ENCOUNTER — HEALTH MAINTENANCE LETTER (OUTPATIENT)
Age: 56
End: 2021-01-15

## 2021-02-02 ENCOUNTER — OFFICE VISIT (OUTPATIENT)
Dept: FAMILY MEDICINE | Facility: CLINIC | Age: 56
End: 2021-02-02
Payer: COMMERCIAL

## 2021-02-02 ENCOUNTER — TELEPHONE (OUTPATIENT)
Dept: FAMILY MEDICINE | Facility: CLINIC | Age: 56
End: 2021-02-02

## 2021-02-02 VITALS
RESPIRATION RATE: 18 BRPM | HEIGHT: 73 IN | TEMPERATURE: 97.8 F | BODY MASS INDEX: 35.12 KG/M2 | WEIGHT: 265 LBS | SYSTOLIC BLOOD PRESSURE: 124 MMHG | HEART RATE: 60 BPM | DIASTOLIC BLOOD PRESSURE: 84 MMHG | OXYGEN SATURATION: 99 %

## 2021-02-02 DIAGNOSIS — Z00.00 ROUTINE GENERAL MEDICAL EXAMINATION AT A HEALTH CARE FACILITY: ICD-10-CM

## 2021-02-02 DIAGNOSIS — Z11.59 NEED FOR HEPATITIS C SCREENING TEST: ICD-10-CM

## 2021-02-02 LAB
CHOLEST SERPL-MCNC: 206 MG/DL
HBA1C MFR BLD: 5.9 % (ref 0–5.6)
HDLC SERPL-MCNC: 47 MG/DL
LDLC SERPL CALC-MCNC: 137 MG/DL
NONHDLC SERPL-MCNC: 159 MG/DL
TRIGL SERPL-MCNC: 111 MG/DL

## 2021-02-02 PROCEDURE — 36415 COLL VENOUS BLD VENIPUNCTURE: CPT | Performed by: NURSE PRACTITIONER

## 2021-02-02 PROCEDURE — 80061 LIPID PANEL: CPT | Performed by: NURSE PRACTITIONER

## 2021-02-02 PROCEDURE — 99396 PREV VISIT EST AGE 40-64: CPT | Performed by: NURSE PRACTITIONER

## 2021-02-02 PROCEDURE — 86803 HEPATITIS C AB TEST: CPT | Performed by: NURSE PRACTITIONER

## 2021-02-02 PROCEDURE — 83036 HEMOGLOBIN GLYCOSYLATED A1C: CPT | Performed by: NURSE PRACTITIONER

## 2021-02-02 ASSESSMENT — ENCOUNTER SYMPTOMS
HEMATOCHEZIA: 0
ABDOMINAL PAIN: 0
HEADACHES: 0
CONSTIPATION: 0
COUGH: 0
JOINT SWELLING: 0
DIZZINESS: 0
PALPITATIONS: 0
SORE THROAT: 0
DYSURIA: 0
HEARTBURN: 0
CHILLS: 0
HEMATURIA: 0
NERVOUS/ANXIOUS: 0
NAUSEA: 0
SHORTNESS OF BREATH: 0
ARTHRALGIAS: 1
FEVER: 0
FREQUENCY: 0
EYE PAIN: 0
DIARRHEA: 0
MYALGIAS: 0
PARESTHESIAS: 0
WEAKNESS: 0

## 2021-02-02 ASSESSMENT — PATIENT HEALTH QUESTIONNAIRE - PHQ9
SUM OF ALL RESPONSES TO PHQ QUESTIONS 1-9: 4
10. IF YOU CHECKED OFF ANY PROBLEMS, HOW DIFFICULT HAVE THESE PROBLEMS MADE IT FOR YOU TO DO YOUR WORK, TAKE CARE OF THINGS AT HOME, OR GET ALONG WITH OTHER PEOPLE: NOT DIFFICULT AT ALL
SUM OF ALL RESPONSES TO PHQ QUESTIONS 1-9: 4

## 2021-02-02 ASSESSMENT — ANXIETY QUESTIONNAIRES
2. NOT BEING ABLE TO STOP OR CONTROL WORRYING: NOT AT ALL
4. TROUBLE RELAXING: SEVERAL DAYS
5. BEING SO RESTLESS THAT IT IS HARD TO SIT STILL: NOT AT ALL
7. FEELING AFRAID AS IF SOMETHING AWFUL MIGHT HAPPEN: NOT AT ALL
3. WORRYING TOO MUCH ABOUT DIFFERENT THINGS: NOT AT ALL
GAD7 TOTAL SCORE: 1
7. FEELING AFRAID AS IF SOMETHING AWFUL MIGHT HAPPEN: NOT AT ALL
GAD7 TOTAL SCORE: 1
GAD7 TOTAL SCORE: 1
1. FEELING NERVOUS, ANXIOUS, OR ON EDGE: NOT AT ALL
6. BECOMING EASILY ANNOYED OR IRRITABLE: NOT AT ALL

## 2021-02-02 ASSESSMENT — MIFFLIN-ST. JEOR: SCORE: 2095.91

## 2021-02-02 ASSESSMENT — PAIN SCALES - GENERAL: PAINLEVEL: NO PAIN (0)

## 2021-02-02 NOTE — PATIENT INSTRUCTIONS
Preventive Health Recommendations  Male Ages 50 - 64    Yearly exam:             See your health care provider every year in order to  o   Review health changes.   o   Discuss preventive care.    o   Review your medicines if your doctor has prescribed any.     Have a cholesterol test every 5 years, or more frequently if you are at risk for high cholesterol/heart disease.     Have a diabetes test (fasting glucose) every three years. If you are at risk for diabetes, you should have this test more often.     Have a colonoscopy at age 50, or have a yearly FIT test (stool test). These exams will check for colon cancer.      Talk with your health care provider about whether or not a prostate cancer screening test (PSA) is right for you.    You should be tested each year for STDs (sexually transmitted diseases), if you re at risk.     Shots: Get a flu shot each year. Get a tetanus shot every 10 years.     Nutrition:    Eat at least 5 servings of fruits and vegetables daily.     Eat whole-grain bread, whole-wheat pasta and brown rice instead of white grains and rice.     Get adequate Calcium and Vitamin D.     Lifestyle    Exercise for at least 150 minutes a week (30 minutes a day, 5 days a week). This will help you control your weight and prevent disease.     Limit alcohol to one drink per day.     No smoking.     Wear sunscreen to prevent skin cancer.     See your dentist every six months for an exam and cleaning.     See your eye doctor every 1 to 2 years.      Jock itch powder- Micatin or generic.

## 2021-02-02 NOTE — PROGRESS NOTES
SUBJECTIVE:   CC: Trung Dubois is an 55 year old male who presents for preventative health visit.         Patient has been advised of split billing requirements and indicates understanding: Yes     Healthy Habits:     Getting at least 3 servings of Calcium per day:  NO    Bi-annual eye exam:  Yes    Dental care twice a year:  NO    Sleep apnea or symptoms of sleep apnea:  None    Diet:  Regular (no restrictions)    Frequency of exercise:  1 day/week    Duration of exercise:  Less than 15 minutes    Taking medications regularly:  No    Barriers to taking medications:  None    Medication side effects:  Not applicable    PHQ-2 Total Score: 0    Additional concerns today:  No      Takes Protonix as needed if having irritating foods. No significant breakthrough GERD symptoms.   Working towards weight loss.     Mood stable, improved with new job.     Pre-diabetes. Has active job, no formal exercise out of this. Admits his nutrition and exercise are not good.     Today's PHQ-2 Score:   PHQ-2 ( 1999 Pfizer) 2/2/2021   Q1: Little interest or pleasure in doing things 0   Q2: Feeling down, depressed or hopeless 0   PHQ-2 Score 0   Q1: Little interest or pleasure in doing things Not at all   Q2: Feeling down, depressed or hopeless Not at all   PHQ-2 Score 0       Abuse: Current or Past(Physical, Sexual or Emotional)- No  Do you feel safe in your environment? Yes        Social History     Tobacco Use     Smoking status: Never Smoker     Smokeless tobacco: Never Used   Substance Use Topics     Alcohol use: Yes     Comment: 1-2 drinks per week          Alcohol Use 2/2/2021   Prescreen: >3 drinks/day or >7 drinks/week? No   Prescreen: >3 drinks/day or >7 drinks/week? -       Last PSA:   PSA   Date Value Ref Range Status   09/16/2020 1.69 0 - 4 ug/L Final     Comment:     Assay Method:  Chemiluminescence using Siemens Vista analyzer       Reviewed orders with patient. Reviewed health maintenance and updated orders accordingly -  "Yes  Lab work is in process    Reviewed and updated as needed this visit by clinical staff  Tobacco  Allergies  Meds   Med Hx  Surg Hx  Fam Hx  Soc Hx        Reviewed and updated as needed this visit by Provider                    Review of Systems   Constitutional: Negative for chills and fever.   HENT: Positive for ear pain. Negative for congestion, hearing loss and sore throat.    Eyes: Negative for pain and visual disturbance.   Respiratory: Negative for cough and shortness of breath.    Cardiovascular: Negative for chest pain, palpitations and peripheral edema.   Gastrointestinal: Negative for abdominal pain, constipation, diarrhea, heartburn, hematochezia and nausea.   Genitourinary: Positive for genital sores. Negative for discharge, dysuria, frequency, hematuria, impotence and urgency.   Musculoskeletal: Positive for arthralgias. Negative for joint swelling and myalgias.   Skin: Negative for rash.   Neurological: Negative for dizziness, weakness, headaches and paresthesias.   Psychiatric/Behavioral: Negative for mood changes. The patient is not nervous/anxious.        OBJECTIVE:   /84 (BP Location: Left arm, Patient Position: Chair, Cuff Size: Adult Large)   Pulse 60   Temp 97.8  F (36.6  C) (Temporal)   Resp 18   Ht 1.854 m (6' 1\")   Wt 120.2 kg (265 lb)   SpO2 99%   BMI 34.96 kg/m      Physical Exam  GENERAL: healthy, alert and no distress  EYES: Eyes grossly normal to inspection, PERRL and conjunctivae and sclerae normal  HENT: ear canals and TM's normal, nose and mouth without ulcers or lesions  NECK: no adenopathy, no asymmetry, masses, or scars and thyroid normal to palpation  RESP: lungs clear to auscultation - no rales, rhonchi or wheezes  CV: regular rate and rhythm, normal S1 S2, no S3 or S4, no murmur, click or rub, no peripheral edema and peripheral pulses strong  ABDOMEN: soft, nontender, no hepatosplenomegaly, no masses and bowel sounds normal, umbilical hernia repair   " (male): no hernias, penis normal without urethral discharge and red/pink sheer patches in groin folds  MS: no gross musculoskeletal defects noted, no edema  SKIN: no suspicious lesions or rashes  NEURO: Normal strength and tone, mentation intact and speech normal  PSYCH: mentation appears normal, affect normal/bright    Diagnostic Test Results:  Labs reviewed in Epic  Results for orders placed or performed in visit on 02/02/21   Hepatitis C Screen Reflex to HCV RNA Quant and Genotype     Status: None   Result Value Ref Range    Hepatitis C Antibody Nonreactive NR^Nonreactive   Lipid panel reflex to direct LDL Non-fasting     Status: Abnormal   Result Value Ref Range    Cholesterol 206 (H) <200 mg/dL    Triglycerides 111 <150 mg/dL    HDL Cholesterol 47 >39 mg/dL    LDL Cholesterol Calculated 137 (H) <100 mg/dL    Non HDL Cholesterol 159 (H) <130 mg/dL   Hemoglobin A1c     Status: Abnormal   Result Value Ref Range    Hemoglobin A1C 5.9 (H) 0 - 5.6 %       ASSESSMENT/PLAN:       ICD-10-CM    1. Routine general medical examination at a health care facility  Z00.00 Lipid panel reflex to direct LDL Non-fasting     Hemoglobin A1c     Fecal colorectal cancer screen (FIT)   2. Need for hepatitis C screening test  Z11.59 Hepatitis C Screen Reflex to HCV RNA Quant and Genotype       Patient has been advised of split billing requirements and indicates understanding: Yes  COUNSELING:   Reviewed preventive health counseling, as reflected in patient instructions       Regular exercise       Healthy diet/nutrition       Vision screening       Consider Hep C screening for all patients one time for ages 18-79 years       Colon cancer screening       Prostate cancer screening       Advance Care Planning       pre-diabetes prevention.   Pt. Declines vaccines.   Pt. Wants FIT test, aware of risks.     Reflux taking PPI only with insulting foods, approximately. 1 time/week. Discussed prevention with healthy eating to avoid use.  "    Estimated body mass index is 34.96 kg/m  as calculated from the following:    Height as of this encounter: 1.854 m (6' 1\").    Weight as of this encounter: 120.2 kg (265 lb).     Weight management plan: Discussed healthy diet and exercise guidelines pt. declined nutrition referral.        He reports that he has never smoked. He has never used smokeless tobacco.      Counseling Resources:  ATP IV Guidelines  Pooled Cohorts Equation Calculator  FRAX Risk Assessment  ICSI Preventive Guidelines  Dietary Guidelines for Americans, 2010  USDA's MyPlate  ASA Prophylaxis  Lung CA Screening    Sangeetha Salazar, DAWOOD CNP  [facility]  Answers for HPI/ROS submitted by the patient on 2/2/2021   Annual Exam:  If you checked off any problems, how difficult have these problems made it for you to do your work, take care of things at home, or get along with other people?: Not difficult at all  PHQ9 TOTAL SCORE: 4  ENID 7 TOTAL SCORE: 1    "

## 2021-02-02 NOTE — TELEPHONE ENCOUNTER
Patient saw the message on his Hemoglobin A1c and he said that morning he had sweetened cereal and a mountain Dew that morning and only fasted 6 hr prior. He would like to know would that be the reason it was out of range.  Please clarify.  Okay to lm

## 2021-02-03 LAB — HCV AB SERPL QL IA: NONREACTIVE

## 2021-02-03 ASSESSMENT — PATIENT HEALTH QUESTIONNAIRE - PHQ9: SUM OF ALL RESPONSES TO PHQ QUESTIONS 1-9: 4

## 2021-02-03 ASSESSMENT — ANXIETY QUESTIONNAIRES: GAD7 TOTAL SCORE: 1

## 2021-02-03 NOTE — TELEPHONE ENCOUNTER
Message sent through [employer].     It is not, I drew the lab that is not impacted by his intake today, it is a average percentage based on the last 3 months.   I did not check the glucose directly today, as it was only about 6 hours since his intake earlier today. DAWOOD Cody CNP

## 2021-02-04 ENCOUNTER — TELEPHONE (OUTPATIENT)
Dept: FAMILY MEDICINE | Facility: CLINIC | Age: 56
End: 2021-02-04

## 2021-02-04 DIAGNOSIS — E66.811 CLASS 1 OBESITY DUE TO EXCESS CALORIES WITH SERIOUS COMORBIDITY IN ADULT, UNSPECIFIED BMI: Primary | ICD-10-CM

## 2021-02-04 DIAGNOSIS — E66.09 CLASS 1 OBESITY DUE TO EXCESS CALORIES WITH SERIOUS COMORBIDITY IN ADULT, UNSPECIFIED BMI: Primary | ICD-10-CM

## 2021-02-04 NOTE — TELEPHONE ENCOUNTER
----- Message from DAWOOD Altamirano CNP sent at 2/4/2021  1:55 PM CST -----  Normal hepatitis C screening. See message below.   Could consider a medication for cholesterol as well. Pt. To schedule phone visit if wanting this option. Recommend starting this next year regardless, due to age.   DAWOOD Cody CNP

## 2021-02-04 NOTE — TELEPHONE ENCOUNTER
Spoke with pt and gave information below. Does not want to start medication at this time. He is wondering if diet changes would help this and what he would need to change to help lower this. Also wondering if that would help A1c. Also wondering if the fact that he only fasted for 6 hours instead of 8 hours would have impacted is test at all?    Sharon Sanders CMA ([facility])

## 2021-02-05 NOTE — TELEPHONE ENCOUNTER
Spoke with pt and gave information below. Pt stated understanding and declined referral at this time.    Sharon Sanders CMA ([facility])

## 2021-02-05 NOTE — TELEPHONE ENCOUNTER
Yes diet and nutrition changes need to happen and can help the A1c. That would be great.     I recommend starting to exercise outside of work and gradually build to 30 minutes of exercise most days of the week- build up to this!! It does need to be outside of work to help his health- this is proven in studies.     The fasting has NO impact on the test I ordered it- either way.   A nutrition referral is recommended and I placed one, I recommend he do this with his wife in attendance. DAWOOD Cody CNP

## 2021-02-08 DIAGNOSIS — Z00.00 ROUTINE GENERAL MEDICAL EXAMINATION AT A HEALTH CARE FACILITY: ICD-10-CM

## 2021-02-08 PROCEDURE — 82274 ASSAY TEST FOR BLOOD FECAL: CPT | Performed by: NURSE PRACTITIONER

## 2021-02-09 ENCOUNTER — NURSE TRIAGE (OUTPATIENT)
Dept: NURSING | Facility: CLINIC | Age: 56
End: 2021-02-09

## 2021-02-10 ENCOUNTER — OFFICE VISIT (OUTPATIENT)
Dept: FAMILY MEDICINE | Facility: CLINIC | Age: 56
End: 2021-02-10
Payer: COMMERCIAL

## 2021-02-10 ENCOUNTER — ANCILLARY PROCEDURE (OUTPATIENT)
Dept: GENERAL RADIOLOGY | Facility: CLINIC | Age: 56
End: 2021-02-10
Attending: PHYSICIAN ASSISTANT
Payer: COMMERCIAL

## 2021-02-10 VITALS
DIASTOLIC BLOOD PRESSURE: 78 MMHG | TEMPERATURE: 98.5 F | BODY MASS INDEX: 34.72 KG/M2 | WEIGHT: 262 LBS | HEIGHT: 73 IN | OXYGEN SATURATION: 99 % | RESPIRATION RATE: 18 BRPM | SYSTOLIC BLOOD PRESSURE: 118 MMHG | HEART RATE: 66 BPM

## 2021-02-10 DIAGNOSIS — S99.921A FOOT INJURY, RIGHT, INITIAL ENCOUNTER: ICD-10-CM

## 2021-02-10 DIAGNOSIS — S99.921A FOOT INJURY, RIGHT, INITIAL ENCOUNTER: Primary | ICD-10-CM

## 2021-02-10 PROCEDURE — 99214 OFFICE O/P EST MOD 30 MIN: CPT | Performed by: PHYSICIAN ASSISTANT

## 2021-02-10 PROCEDURE — 73630 X-RAY EXAM OF FOOT: CPT | Mod: RT | Performed by: RADIOLOGY

## 2021-02-10 ASSESSMENT — PAIN SCALES - GENERAL: PAINLEVEL: SEVERE PAIN (7)

## 2021-02-10 ASSESSMENT — MIFFLIN-ST. JEOR: SCORE: 2082.3

## 2021-02-10 NOTE — PATIENT INSTRUCTIONS
Ice 20 min a few times per day    Elevate    Anti-inflammatories     Firm bottom shoes w/support

## 2021-02-10 NOTE — TELEPHONE ENCOUNTER
"Pt reports injuring his foot today, he is able to bear weight and has a mild limp with \"slight pain\".  He reports numbness of his toes but is unsure if this is a new symptom.  Swelling is present, no discoloration.     Disposition:  If numbness of the toes is new, RN advised ED tonight.  If numbness is not new, RN advised to see a provider in clinic within 24 hours.  He verbalized understanding and had no further questions.  Unsure if pt will seek care or monitor.     COVID 19 Nurse Triage Plan/Patient Instructions    Please be aware that novel coronavirus (COVID-19) may be circulating in the community. If you develop symptoms such as fever, cough, or SOB or if you have concerns about the presence of another infection including coronavirus (COVID-19), please contact your health care provider or visit [email or web address].     Disposition/Instructions    In-Person Visit with provider recommended. Reference Visit Selection Guide.    Thank you for taking steps to prevent the spread of this virus.  o Limit your contact with others.  o Wear a simple mask to cover your cough.  o Wash your hands well and often.    Resources    [facility]: About COVID-19: [email or web address]    CDC: What to Do If You're Sick: www.cdc.gov/coronavirus/2019-ncov/about/steps-when-sick.html    CDC: Ending Home Isolation: www.cdc.gov/coronavirus/2019-ncov/hcp/disposition-in-home-patients.html     CDC: Caring for Someone: www.cdc.gov/coronavirus/2019-ncov/if-you-are-sick/care-for-someone.html     [facility]: Interim Guidance for Hospital Discharge to Home: www.health.[facility].mn.us/diseases/coronavirus/hcp/hospdischarge.pdf    [facility] clinical trials (COVID-19 research studies): clinicalaffairs.[facility]/um-clinical-trials     Below are the COVID-19 hotlines at the [place] Department of Health ([facility]). Interpreters are available.   o For health questions: Call 055-104-9329 or 1-330.922.9478 (7 a.m. to 7 p.m.)  o For questions " about schools and childcare: Call 308-086-0911 or 1-185.795.7336 (7 a.m. to 7 p.m.)                   Adri Corona RN/FNA    Additional Information    Negative: Serious injury with multiple fractures    Negative: [1] Major bleeding (e.g., actively dripping or spurting) AND [2] can't be stopped    Negative: Amputation    Negative: Looks like a dislocated joint (very crooked or deformed)    Negative: Sounds like a life-threatening emergency to the triager    Negative: Bullet wound, stabbed by knife, or other serious penetrating wound    Negative: Skin is split open or gaping (or length > 1/2 inch or 12 mm)    Negative: [1] Bleeding AND [2] won't stop after 10 minutes of direct pressure (using correct technique)    Negative: [1] Dirt in the wound AND [2] not removed with 15 minutes of scrubbing    Negative: Can't stand (bear weight) or walk    Negative: Sounds like a serious injury to the triager    Negative: [1] SEVERE pain AND [2] not improved 2 hours after pain medicine/ice packs    Negative: Suspicious history for the injury    Negative: [1] Numbness (new loss of sensation) of toe(s) AND [2] present now    [1] Limp when walking AND [2] due to a twisted ankle or foot    Protocols used: FOOT AND ANKLE INJURY-A-AH

## 2021-02-10 NOTE — PROGRESS NOTES
Assessment & Plan     Foot injury, right, initial encounter  No fracture  Ice, elevation.   Letter for work for modified duty rest of week- seated tasks and limit standing.   Anti-inflammatories if no contraindications  Firm bottom, supportive shoes.   Recheck if not gradually improving as expected   - XR Foot Right G/E 3 Views; Future    Follow Up: The patient was instructed to contact clinic for worsening symptoms, non-improvement as expected/discussed, and for questions regarding medications or treatment plan. Discussed parameters for follow up and included in After Visit Summary given to patient.    Return in about 2 weeks (around 2/24/2021) for Recheck if needed for non-improvement.    Liseth Mckeon PA-C  [facility] [place] Rojas is a 55 year old who presents for the following health issues     HPI       Musculoskeletal problem/pain  Onset/Duration: 1 day ago- tripped and missed a step going up the stairs. Caught his foot in hyperflexion (dorsiflexion). Since pain greatest along 1st metatarsal and around 1st MTP. Pain radiates up into lower leg w/walking.   No pop. No N/T. + swelling. No bruising. Able to bear weight but painful. He is concerned job on feet all day.    Right knee hurts a bit too which started at the same time.   No other injuries incurred  Description  Location: foot - right  Joint Swelling: YES  Redness: no  Pain: YES  Warmth: YES  Intensity:  7/10  Progression of Symptoms:  same  Accompanying signs and symptoms:   Fevers: no  Numbness/tingling/weakness: YES  History  Trauma to the area: YES  Recent illness:  no  Previous similar problem: no  Previous evaluation:  no  Precipitating or alleviating factors:  Aggravating factors include: walking  Therapies tried and outcome: heat, ice and Ibuprofen    Review of Systems   Constitutional, HEENT, cardiovascular, pulmonary, gi and gu systems are negative, except as otherwise noted.      Objective    /78 (BP  "Location: Left arm, Patient Position: Chair, Cuff Size: Adult Large)   Pulse 66   Temp 98.5  F (36.9  C) (Temporal)   Resp 18   Ht 1.854 m (6' 1\")   Wt 118.8 kg (262 lb)   SpO2 99%   BMI 34.57 kg/m    There is no height or weight on file to calculate BMI.  Physical Exam   GENERAL: healthy, alert and no distress  MS: right foot: no bruising. Soft tissue swelling along medial and plantar foot around 1st MTP. Greatest tenderness distal 1st metatarsal and MTP. No erythema or warmth. Normal ROM of foot, toes and ankle w/flexion, extension, inversion, eversion. Normal sensation and pulses. No abrasions of rashes.   Right ankle: nontender malleoli w/palpation or base of 5th MT.     Xray - Reviewed and interpreted by me.  No acute fracture.     Xr Foot Right G/e 3 Views    Result Date: 2/10/2021  XR FOOT RT G/E 3 VW 2/10/2021 8:02 AM HISTORY: injured on stairs; pain along 1st MT and at 1st MTP; Foot injury, right, initial encounter COMPARISON: None.     IMPRESSION: No fractures are identified. Mild-to-moderate narrowing and hypertrophic change in the first MTP joint. The remaining joint spaces are global. ROSA MICHAEL MD        "

## 2021-02-10 NOTE — LETTER
[facility]  [address], [address]  [place] [address]  Phone: [phone]  Fax: [phone]    February 10, 2021        Trung Dubois  [address] [address] [place] [id number]          To whom it may concern:    RE: Trung BREWER Dubois    Patient was seen and treated today at our clinic.    Due to right foot injury please allow modifications to job tasks through 02/12/2021.    Please allow seated work as needed. Limit standing.      Please contact me for questions or concerns.      Sincerely,        Liseth Mckeon PA-C

## 2021-02-12 LAB — HEMOCCULT STL QL IA: NEGATIVE

## 2021-02-12 NOTE — RESULT ENCOUNTER NOTE
Bob,  I have reviewed your labs, and they are all normal. If you have questions, please notify me through MyChart or a telephone call.   Sangeetha Salazar, DNP

## 2021-03-29 NOTE — PROGRESS NOTES
"    Assessment & Plan   1. Acute pain of left shoulder: Likely muscular strain of rhomboid or levator scapula.  Reproducible with certain motions and not necessarily with activity making anginal symptoms unlikely.  Given these findings patient declines x-ray.  Unlikely rotator cuff pathology.  Recommend watchful waiting and treatment with Tylenol and ibuprofen and topical therapies as needed.  Patient agreeable plan.  To follow-up if worsening or does not resolve.  Discussed home physical therapy exercises.  Consider formal physical therapy if not improving.      Return in about 2 weeks (around 4/16/2021), or if symptoms worsen or fail to improve.    Murphy Hernandez MD  [facility]    This chart is completed utilizing dictation software; typos and/or incorrect word substitutions may unintentionally occur.    Terry Rojas is a 55 year old who presents for the following health issues    HPI     Musculoskeletal problem/pain  Onset/Duration: Its been a while. Tried acupuncture and that made it worse. Chiropractic care.   Description  Location: left shoulder  Joint Swelling: no  Redness: no  Pain: YES- and the pain will radiate across his chest and shoulders.   Warmth: no  Intensity:  moderate, severe  Progression of Symptoms:  worsening  Accompanying signs and symptoms:   Fevers: no  Numbness/tingling/weakness: no \"not in the shoulder but I do in my foot\"  History  Trauma to the area: YES- maybe\"fell and he is wondering if he did something to his back at that time and that's what is causing this pain.   Recent illness:  no  Previous similar problem: no  Previous evaluation:  no  Precipitating or alleviating factors:  Aggravating factors include: lifting or pulling himself up in certain ways will hurt.     Patient notes approximate 1 week of left shoulder pain.  Has had increased demand at work.  But no specific injury noted.  Has tried going to the chiropractor and acupuncture " which she believes is made it worse.  States it might occasionally radiate down his left upper arm; however, is typically located in left shoulder.    Not reproducible with exertion.  Worse with twisting or reaching motions.    Denies any rash, fever, chills.  Denies any urinary incontinence, extremity numbness, weakness, tingling.    Review of Systems   Constitutional, MSK, neuro, Derm, lymph, derm, msk,  cardiovascular, pulmonary, gi and gu systems are negative, except as otherwise noted.      Objective    /72   Pulse 63   Temp 98.1  F (36.7  C) (Temporal)   Resp 14   Wt 117.9 kg (260 lb)   SpO2 98%   BMI 34.30 kg/m    Body mass index is 34.3 kg/m .  Physical Exam   General: Appears well and in no acute distress.  Cardiovascular: Regular rate and rhythm, normal S1 and S2 without murmur. No extra heartsounds or friction rub. Radial pulses present and equal bilaterally.  Respiratory: Lungs clear to auscultation bilaterally. No wheezing or crackles. No prolonged expiration. Symmetrical chest rise.  Musculoskeletal: No pain to palpation over clavicle, AC joint, deltoid, triceps, long head of the biceps.  Pain to palpation over rhomboid and levator scapula muscle on the left.  Normal strength of internal and external rotation without reproduction of pain.  Negative empty cans test.  Negative Neer's test.  No gross extremity deformities. No peripheral edema. Normal muscle bulk.  Neuro: Light touch sensation of upper and lower extremities intact.    Labs: None

## 2021-04-02 ENCOUNTER — OFFICE VISIT (OUTPATIENT)
Dept: FAMILY MEDICINE | Facility: CLINIC | Age: 56
End: 2021-04-02
Payer: COMMERCIAL

## 2021-04-02 VITALS
WEIGHT: 260 LBS | DIASTOLIC BLOOD PRESSURE: 72 MMHG | BODY MASS INDEX: 34.3 KG/M2 | OXYGEN SATURATION: 98 % | RESPIRATION RATE: 14 BRPM | SYSTOLIC BLOOD PRESSURE: 128 MMHG | TEMPERATURE: 98.1 F | HEART RATE: 63 BPM

## 2021-04-02 DIAGNOSIS — M25.512 ACUTE PAIN OF LEFT SHOULDER: Primary | ICD-10-CM

## 2021-04-02 PROCEDURE — 99213 OFFICE O/P EST LOW 20 MIN: CPT | Performed by: FAMILY MEDICINE

## 2021-07-30 NOTE — TELEPHONE ENCOUNTER
S: calling about difficulty voiding.  B: Had umbilical hernia repair today at [facility].  Voided at 1000 prior to surgery.  Calling because it took around 10 hours to void post procedure.  The urine was dark yellow, some difficulty getting stream started, no blood in urine, and slight odor.  Drank 4 glasses of water post procedure.   A: Encourage Bob to drink more fluids as able on Friday.  R: He will call back as needed.  Nahomy Alexandra RN, [facility]      Additional Information    Negative: Shock suspected (e.g., cold/pale/clammy skin, too weak to stand, low BP, rapid pulse)    Negative: Sounds like a life-threatening emergency to the triager    Negative: [1] Unable to urinate (or only a few drops) > 4 hours AND     [2] bladder feels very full (e.g., feels blocked with strong urge to urinate; palpable bladder)    Negative: Swollen scrotum    Negative: [1] Constant pain in scrotum or testicle AND [2] present > 1 hour    Negative: Vomiting    Negative: Patient sounds very sick or weak to the triager    Negative: [1] SEVERE pain with urination  (e.g., excruciating) AND [2] not improved after 2 hours of pain medicine (e.g., acetaminophen or ibuprofen)    Negative: Fever > 100.5 F (38.1 C)    Negative: Side (flank) or lower back pain present    Negative: Diabetes mellitus or weak immune system (e.g., HIV positive, cancer chemotherapy, transplant patient)    Negative: Bedridden (e.g., nursing home patient, CVA, chronic illness, recovering from surgery)    Negative: Artificial heart valve or artificial joint    Negative: Pus (white, yellow) or bloody discharge from end of penis    Negative: Blood in urine (red, pink, or tea-colored)    Negative: All other males with painful urination    Protocols used: URINATION PAIN - MALE-A-      
.

## 2021-08-08 ENCOUNTER — NURSE TRIAGE (OUTPATIENT)
Dept: NURSING | Facility: CLINIC | Age: 56
End: 2021-08-08

## 2021-08-09 NOTE — TELEPHONE ENCOUNTER
Pt is calling.    Poison Ivy last year. Now with a rash. Doesn't look like poison ivy.  He has an insect bite that looks like a mosquito bites from his elbow down his arm. Very itchy.  Smaller, slightly raised red areas that look like mosquito bites on both elbows.   He has had these x 2-3 weeks. Has tried hydrocortisone cream, triamcinolone cream that he has at home. This does help with the itching. Hard on the inside. No blister. No drainage.   He has 20 of them on the left arm alone. Elbow and forearm only.  Care advice reviewed. When to call back reviewed per care advice. I advised him to schedule an appointment to be seen. He stated that he will try applying the triamcinolone cream twice a day x 7 days. IF not any better or if worsens, he will then call back for an appointment. I advised him not to itch at them, keep them clean and dry.  He verbalized understanding.    Reason for Disposition    Localized rash present > 7 days    Additional Information    Negative: [1] Sudden onset of rash (within last 2 hours) AND [2] difficulty with breathing or swallowing    Negative: Sounds like a life-threatening emergency to the triager    Negative: Poison ivy, oak, or sumac contact suspected    Negative: Insect bite(s) suspected    Negative: Ringworm suspected (i.e., round pink patch, sometimes looks like ring, usually 1/2 to 1 inch [12-25 mm],  in size, slowly increasing in size)    Negative: Athlete's Foot suspected (i.e., itchy rash between the toes)    Negative: Jock Itch suspected (i.e., itchy rash on inner thighs near genital area)    Negative: Wound infection suspected (i.e., pain, spreading redness, or pus; in a cut, puncture, scrape or sutured wound)    Negative: Impetigo suspected  (i.e., painless infected superficial small sores, less than 1 inch or 2.5 cm, often covered by a soft, yellow-brown scab or crust; sometimes occurring near nasal openings)    Negative: Shingles suspected (i.e., painful rash, multiple  small blisters grouped together in one area of body; dermatomal distribution)    Negative: Rash of external female genital area (vulva)    Negative: Rash of penis or scrotum    Negative: Small spot, skin growth, or mole    Negative: Sores or skin ulcer, not a rash    Negative: Localized lump (or swelling) without redness or rash    Negative: [1] Localized purple or blood-colored spots or dots AND [2] not from injury or friction AND [3] fever    Negative: Patient sounds very sick or weak to the triager    Negative: [1] Red area or streak AND [2] fever    Negative: [1] Rash is painful to touch AND [2] fever    Negative: [1] Looks infected (spreading redness, pus) AND [2] large red area (> 2 in. or 5 cm)    Negative: [1] Looks infected (spreading redness, pus) AND [2] diabetes mellitus or weak immune system (e.g., HIV positive, cancer chemo, splenectomy, organ transplant, chronic steroids)    Negative: [1] Localized purple or blood-colored spots or dots AND [2] not from injury or friction AND [3] no fever    Negative: [1] Looks infected (spreading redness, pus) AND [2] no fever    Negative: Looks like a boil, infected sore, deep ulcer or other infected rash    Negative: [1] Localized rash is very painful AND [2] no fever    Negative: Genital area rash    Negative: Lyme disease suspected (e.g., bull's eye rash or tick bite / exposure)    Negative: [1] Applying cream or ointment AND [2] causes severe itch, burning or pain    Negative: [1] Pimples (localized) AND [2 ] no improvement after using CARE ADVICE    Negative: Tender bumps in armpits    Negative: [1] Severe localized itching AND [2] after 2 days of steroid cream    Protocols used: RASH OR REDNESS - [id number]    Sonja Landeros RN  [facility] Nurse Advisor  8/8/2021 at 9:19 PM

## 2021-12-29 ENCOUNTER — NURSE TRIAGE (OUTPATIENT)
Dept: NURSING | Facility: CLINIC | Age: 56
End: 2021-12-29
Payer: COMMERCIAL

## 2021-12-30 ENCOUNTER — TELEPHONE (OUTPATIENT)
Dept: FAMILY MEDICINE | Facility: CLINIC | Age: 56
End: 2021-12-30
Payer: COMMERCIAL

## 2021-12-30 DIAGNOSIS — J06.9 UPPER RESPIRATORY TRACT INFECTION, UNSPECIFIED TYPE: Primary | ICD-10-CM

## 2021-12-30 NOTE — TELEPHONE ENCOUNTER
Left message asking pt to call back and speak to a triage nurse.    Phyllis Colbert, RYANN, RN, PHN  Registered Nurse-Clinic Triage  [facility]  12/30/2021 at 9:45 AM

## 2021-12-30 NOTE — TELEPHONE ENCOUNTER
Patient called back. He saw the faint positive lines 2 hours after testing, this was beyond the recommended time frame advised by the testing kit. He said his results were negative at the advised time frame instructed by the testing kit.     Patient is adamant about receiving a PCR test. He tried calling the [facility] but did not like the fact they asked him if he has received a booster vaccination within the last 2 weeks.   He is asking if there is any way you can order a PCR test for him (he does not have an active My Chart to submit an e-visit). He is looking for this order to be placed to day if possible.     Thank you!  Fide DAVISN, RN

## 2021-12-30 NOTE — TELEPHONE ENCOUNTER
"Pt reports, runny nose, chills and \"usual cold symptoms I get every year\". Pt reports two positive home Covid tests today with \"a faint line\". Pt denies known or suspected exposure within the past month and recent vaccination. Pt wondering if he could test positive from being recently vaccinated (two weeks). Pt states he does not want to be seen or tested again because it will cost more money.     Advised pt if in doubt, because he is having symptoms he should follow isolation and general precautions. Contact [facility] or test  for more information. Call back if any further questions or concerns.  Message to PCP to further advise.    Pt verbalizes understanding and agrees to plan.     Reason for Disposition    COVID-19 Testing, questions about    Protocols used: CORONAVIRUS (COVID-19) DIAGNOSED OR [id number] 8.25.2021    COVID 19 Nurse Triage Plan/Patient Instructions    Please be aware that novel coronavirus (COVID-19) may be circulating in the community. If you develop symptoms such as fever, cough, or SOB or if you have concerns about the presence of another infection including coronavirus (COVID-19), please contact your health care provider or visit [email or web address].     Disposition/Instructions    Home care recommended. Follow home care protocol based instructions.    Thank you for taking steps to prevent the spread of this virus.  o Limit your contact with others.  o Wear a simple mask to cover your cough.  o Wash your hands well and often.    Resources    [facility]: About COVID-19: [email or web address]    CDC: What to Do If You're Sick: www.cdc.gov/coronavirus/2019-ncov/about/steps-when-sick.html    CDC: Ending Home Isolation: www.cdc.gov/coronavirus/2019-ncov/hcp/disposition-in-home-patients.html     CDC: Caring for Someone: www.cdc.gov/coronavirus/2019-ncov/if-you-are-sick/care-for-someone.html     [facility]: Interim Guidance for Hospital Discharge to Home: " www.health.[facility].mn.us/diseases/coronavirus/hcp/hospdischarge.pdf    [facility] clinical trials (COVID-19 research studies): clinicalaffairs.[facility]/umn-clinical-trials     Below are the COVID-19 hotlines at the [place] [facility] ([facility]). Interpreters are available.   o For health questions: Call 586-714-3656 or 1-806.595.2242 (7 a.m. to 7 p.m.)  o For questions about schools and childcare: Call 954-454-3775 or 1-485.874.2235 (7 a.m. to 7 p.m.)

## 2021-12-30 NOTE — TELEPHONE ENCOUNTER
Quarantine per Covid infection guidelines. Can do back -up PCR through the MD ( not rapid antigen test).  But assume he is positive for Covid. If needing work letters, needs virtual visit.   DAWOOD Cody CNP

## 2021-12-31 NOTE — TELEPHONE ENCOUNTER
See other telephone encounter.    Phyllis Colbert, RYANN, RN, PHN  Registered Nurse-Clinic Triage  [facility]  12/31/2021 at 1:36 PM

## 2022-01-02 ENCOUNTER — LAB (OUTPATIENT)
Dept: FAMILY MEDICINE | Facility: CLINIC | Age: 57
End: 2022-01-02
Attending: NURSE PRACTITIONER
Payer: COMMERCIAL

## 2022-01-02 ENCOUNTER — TELEPHONE (OUTPATIENT)
Dept: NURSING | Facility: CLINIC | Age: 57
End: 2022-01-02

## 2022-01-02 ENCOUNTER — NURSE TRIAGE (OUTPATIENT)
Dept: NURSING | Facility: CLINIC | Age: 57
End: 2022-01-02

## 2022-01-02 DIAGNOSIS — J06.9 UPPER RESPIRATORY TRACT INFECTION, UNSPECIFIED TYPE: ICD-10-CM

## 2022-01-02 PROCEDURE — U0003 INFECTIOUS AGENT DETECTION BY NUCLEIC ACID (DNA OR RNA); SEVERE ACUTE RESPIRATORY SYNDROME CORONAVIRUS 2 (SARS-COV-2) (CORONAVIRUS DISEASE [COVID-19]), AMPLIFIED PROBE TECHNIQUE, MAKING USE OF HIGH THROUGHPUT TECHNOLOGIES AS DESCRIBED BY CMS-2020-01-R: HCPCS

## 2022-01-02 PROCEDURE — U0005 INFEC AGEN DETEC AMPLI PROBE: HCPCS

## 2022-01-03 ENCOUNTER — TELEPHONE (OUTPATIENT)
Dept: FAMILY MEDICINE | Facility: CLINIC | Age: 57
End: 2022-01-03
Payer: COMMERCIAL

## 2022-01-03 LAB — SARS-COV-2 RNA RESP QL NAA+PROBE: POSITIVE

## 2022-01-03 NOTE — TELEPHONE ENCOUNTER
Clinic Action Needed:Yes  Reason for Call:Trung calls and says that he wants to know the result from his Covid test today. Pt. Says that he was around someone on [place] who had Covid. RN then checked [facility] and saw that pt. had a covid test done today at the [facility] and saw that the test is in process. RN told this to pt. And pt. Says that he wants Sangeetha Salazar, DAWOOD OROZCO, to call him when the result is ready. Pt. Says that he has no access to My Chart. Pt. Says that he wants this message left for Sangeetha Salazar. RN then left this message in Epic for Sangeetha Salazar, as requested.   Routed to: [facility] Patient Care Team  Ivon Dowd RN   [facility]  [phone]

## 2022-01-03 NOTE — TELEPHONE ENCOUNTER
Trung calls and says that he wants to know the result from his Covid test today. Pt. Says that he was around someone on [place] who had Covid. RN then checked [facility] and saw that pt. had a covid test done today at the [facility] and saw that the test is in process. RN told this to pt. And pt. Says that he wants Sangeetha Salazar, DAWOOD OROZCO, to call him when the result is ready. Pt. Says that he has no access to My Chart. Pt. Says that he wants this message left for Sangeetha Salazar. RN then left this message in Epic for Sangeetha Martin, as requested. Coronavirus (COVID-19) Notification     Reason for call  Patient requesting results     Lab Result    Lab test 2019-nCoV rRt-PCR in process        RN Recommendations/Instructions per [facility]  Continue to quarantine and follow the instructions given at your testing visit until you receive the results.     Please Contact your PCP clinic or return to the Emergency department if your:    Symptoms worsen or other concerning symptom's.     Patient informed that if test for COVID19 is POSITIVE,  you will receive a call typically within 48 hours from the test date (date lab collected).  If NEGATIVE result, you will receive a letter in the mail or [employer].      Ivon Dowd RN    Reason for Disposition    Caller requesting lab results    Additional Information    Negative: Lab calling with strep throat test results and triager can call in prescription    Negative: Lab calling with urinalysis test results and triager can call in prescription    Negative: Medication questions    Negative: ED call to PCP    Negative: Physician call to PCP    Negative: Call about patient who is currently hospitalized    Negative: Lab or radiology calling with CRITICAL test results    Negative: [1] Prescription not at pharmacy AND [2] was prescribed today by PCP    Negative: [1] Follow-up call from patient regarding patient's clinical status AND [2] information urgent    Negative: [1]  Caller requests to speak ONLY to PCP AND [2] urgent question    Negative: [1] Caller requests to speak to PCP now AND [2] won't tell us reason for call  (Exception: if 10 pm to 6 am, caller must first discuss reason for the call)    Negative: Notification of hospital admission    Negative: Notification of death    Protocols used: PCP CALL - NO TRIAGE-A-

## 2022-01-03 NOTE — TELEPHONE ENCOUNTER
LM- positive Covid result. Pt. To nurseline call for questions.   DAWOOD Cody CNP  DAWOOD Cody CNP

## 2022-01-04 NOTE — TELEPHONE ENCOUNTER
"-Coronavirus (COVID-19) Notification    Caller Name (Patient, parent, daughter/son, grandparent, etc)  Patient     Reason for call  Notify of Positive Coronavirus (COVID-19) lab results, assess symptoms,  review  [employer] [place] recommendations    Lab Result    Lab test:  2019-nCoV rRt-PCR or SARS-CoV-2 PCR    Oropharyngeal AND/OR nasopharyngeal swabs is POSITIVE for 2019-nCoV RNA/SARS-COV-2 PCR (COVID-19 virus)    RN Recommendations/Instructions per [facility] Coronavirus COVID-19 recommendations    Brief introduction script  Introduce self then review script:  \"I am calling on behalf of [employer].  We were notified that your Coronavirus test (COVID-19) for was POSITIVE for the virus.  I have some information to relay to you but first I wanted to mention that the [place] Dept of Health will be contacting you shortly [it's possible MD already called Patient] to talk to you more about how you are feeling and other people you have had contact with who might now also have the virus.  Also,  [employer] [place] is Partnering with the [facility] for Covid-19 research, you may be contacted directly by research staff.\"    Assessment (Inquire about Patient's current symptoms)   Assessment   Current Symptoms at time of phone call: (if no symptoms, document No symptoms] Sore throat and runny nose   Symptoms onset (if applicable) 6 days ago     If at time of call, Patients symptoms hare worsened, the Patient should contact 911 or have someone drive them to Emergency Dept promptly:      If Patient calling 911, inform 911 personal that you have tested positive for the Coronavirus (COVID-19).  Place mask on and await 911 to arrive.    If Emergency Dept, If possible, please have another adult drive you to the Emergency Dept but you need to wear mask when in contact with other people.      Monoclonal Antibody Administration    You may be eligible to receive a new treatment with a monoclonal antibody for preventing " "hospitalization in patients at high risk for complications from COVID-19.   This medication is still experimental and available on a limited basis; it is given through an IV and must be given at an infusion center. Please note that not all people who are eligible will receive the medication since it is in limited supply.     Are you interested in being considered for this medication?  No.   Does the patient fit the criteria: Patient declined    If patient qualifies based on above criteria:  \"You will be contacted if you are selected to receive this treatment in the next 1-2 business days.   This is time sensitive and if you are not selected in the next 1-2 business days, you will not receive the medication.  If you do not receive a call to schedule, you have not been selected.\"      Review information with Patient    Your result was positive. This means you have COVID-19 (coronavirus).  We have sent you a letter that reviews the information that I'll be reviewing with you now.    How can I protect others?    If you have symptoms: stay home and away from others (self-isolate) until:    You've had no fever--and no medicine that reduces fever--for 1 full day (24 hours). And       Your other symptoms have gotten better. For example, your cough or breathing has improved. And     At least 10 days have passed since your symptoms started. (If you've been told by a doctor that you have a weak immune system, wait 20 days.)     If you don't have symptoms: Stay home and away from others (self-isolate) until at least 10 days have passed since your first positive COVID-19 test. (Date test collected)    During this time:    Stay in your own room, including for meals. Use your own bathroom if you can.    Stay away from others in your home. No hugging, kissing or shaking hands. No visitors.     Don't go to work, school or anywhere else.     Clean  high touch  surfaces often (doorknobs, counters, handles, etc.). Use a household " cleaning spray or wipes. You'll find a full list on the EPA website at www.epa.gov/pesticide-registration/list-n-disinfectants-use-against-sars-cov-2.     Cover your mouth and nose with a mask, tissue or other face covering to avoid spreading germs.    Wash your hands and face often with soap and water.    Make a list of people you have been in close contact with recently, even if either of you wore a face covering.   ; Start your list from 2 days before you became ill or had a positive test.  ; Include anyone that was within 6 feet of you for a cumulative total of 15 minutes or more in 24 hours. (Example: if you sat next to  for 5 minutes in the morning and 10 minutes in the afternoon, then you were in close contact for 15 minutes total that day.  would be added to your list.)    A public health worker will call or text you. It is important that you answer. They will ask you questions about possible exposures to COVID-19, such as people you have been in direct contact with and places you have visited.    Tell the people on your list that you have COVID-19; they should stay away from others for 14 days starting from the last time they were in contact with you (unless you are told something different from a public health worker).     Caregivers in these groups are at risk for severe illness due to COVID-19:  o People 65 years and older  o People who live in a nursing home or long-term care facility  o People with chronic disease (lung, heart, cancer, diabetes, kidney, liver, immunologic)  o People who have a weakened immune system, including those who:  - Are in cancer treatment  - Take medicine that weakens the immune system, such as corticosteroids  - Had a bone marrow or organ transplant  - Have an immune deficiency  - Have poorly controlled HIV or AIDS  - Are obese (body mass index of 40 or higher)  - Smoke regularly    Caregivers should wear gloves while washing dishes, handling laundry and cleaning  bedrooms and bathrooms.    Wash and dry laundry with special caution. Don't shake dirty laundry, and use the warmest water setting you can.    If you have a weakened immune system, ask your doctor about other actions you should take.    For more tips, go to www.cdc.gov/coronavirus/2019-ncov/downloads/10Things.pdf.    You should not go back to work until you meet the guidelines above for ending your home isolation. You don't need to be retested for COVID-19 before going back to work--studies show that you won't spread the virus if it's been at least 10 days since your symptoms started (or 20 days, if you have a weak immune system).    Employers: This document serves as formal notice of your employee's medical guidelines for going back to work. They must meet the above guidelines before going back to work in person.    How can I take care of myself?    1. Get lots of rest. Drink extra fluids (unless a doctor has told you not to).    2. Take Tylenol (acetaminophen) for fever or pain. If you have liver or kidney problems, ask your family doctor if it's okay to take Tylenol.     Take either:     650 mg (two 325 mg pills) every 4 to 6 hours, or     1,000 mg (two 500 mg pills) every 8 hours as needed.     Note: Don't take more than 3,000 mg in one day. Acetaminophen is found in many medicines (both prescribed and over-the-counter medicines). Read all labels to be sure you don't take too much.    For children, check the Tylenol bottle for the right dose (based on their age or weight).    3. If you have other health problems (like cancer, heart failure, an organ transplant or severe kidney disease): Call your specialty clinic if you don't feel better in the next 2 days.    4. Know when to call 911: Emergency warning signs include:    Trouble breathing or shortness of breath    Pain or pressure in the chest that doesn't go away    Feeling confused like you haven't felt before, or not being able to wake up    Bluish-colored  lips or face    5. Sign up for [employer]. We know it's scary to hear that you have COVID-19. We want to track your symptoms to make sure you're okay over the next 2 weeks. Please look for an email from [employer]--this is a free, online program that we'll use to keep in touch. To sign up, follow the link in the email. Learn more at [email or web address].    Where can I get more information?    [facility]: [email or web address]    Coronavirus Basics: www.health.[facility].[place]./diseases/coronavirus/basics.html    What to Do If You're Sick: www.cdc.gov/coronavirus/2019-ncov/about/steps-when-sick.html    Ending Home Isolation: www.cdc.gov/coronavirus/2019-ncov/hcp/disposition-in-home-patients.html     Caring for Someone with COVID-19: www.cdc.gov/coronavirus/2019-ncov/if-you-are-sick/care-for-someone.html     [facility] clinical trials (COVID-19 research studies): clinicalaffairs.[facility]-clinical-trials     A Positive COVID-19 letter will be sent via [employer] or the mail. (Exception, no letters sent to Presurgerical/Preprocedure Patients)    Arti Avery LPN

## 2022-02-04 NOTE — PROGRESS NOTES
SUBJECTIVE:   CC: Trung Dubois is an 56 year old male who presents for preventative health visit.         Patient has been advised of split billing requirements and indicates understanding: Yes     Healthy Habits:    Getting at least 3 servings of Calcium per day:  Yes    Bi-annual eye exam:  Yes    Dental care twice a year:  NO (Once a year)    Sleep apnea or symptoms of sleep apnea:  None    Diet:  Regular (no restrictions)    Frequency of exercise:  None    Duration of exercise:  N/A    Taking medications regularly:  Not Applicable    Medication side effects:  Not applicable    PHQ-2 Total Score:    Additional concerns today:  No      Today's PHQ-2 Score:   PHQ-2 ( 1999 Pfizer) 2/2/2021   Q1: Little interest or pleasure in doing things 0   Q2: Feeling down, depressed or hopeless 0   PHQ-2 Score 0   PHQ-2 Total Score (12-17 Years)- Positive if 3 or more points; Administer PHQ-A if positive 0   Q1: Little interest or pleasure in doing things Not at all   Q2: Feeling down, depressed or hopeless Not at all   PHQ-2 Score 0       Abuse: Current or Past(Physical, Sexual or Emotional)- No  Do you feel safe in your environment? Yes    Have you ever done Advance Care Planning? (For example, a Health Directive, POLST, or a discussion with a medical provider or your loved ones about your wishes): Yes, patient states has an Advance Care Planning document and will bring a copy to the clinic.    Social History     Tobacco Use     Smoking status: Never Smoker     Smokeless tobacco: Never Used   Substance Use Topics     Alcohol use: Yes     Comment: 1-2 drinks per week      If you drink alcohol do you typically have >3 drinks per day or >7 drinks per week? No    No flowsheet data found.    Last PSA:   PSA   Date Value Ref Range Status   09/16/2020 1.69 0 - 4 ug/L Final     Comment:     Assay Method:  Chemiluminescence using Siemens Vista analyzer     Prostate Specific Antigen Screen   Date Value Ref Range Status   02/07/2022  "2.00 0.00 - 4.00 ug/L Final       Reviewed orders with patient. Reviewed health maintenance and updated orders accordingly - Yes  Lab ordered.     Reviewed and updated as needed this visit by clinical staff  Tobacco  Allergies  Meds   Med Hx  Surg Hx  Fam Hx  Soc Hx     Mood- pt. Feels stable, does not want aid with this . Coping with empty  Nest syndrome.     Pre-diabetes noted in labs.   Pt. Knows weight is up. Not formally exercising.     Reviewed and updated as needed this visit by Provider                   Review of Systems  CONSTITUTIONAL: NEGATIVE for fever, chills, change in weight  INTEGUMENTARY/SKIN: NEGATIVE for worrisome rashes, moles or lesions  EYES: NEGATIVE for vision changes or irritation  ENT: NEGATIVE for ear, mouth and throat problems  RESP: NEGATIVE for significant cough or SOB  CV: NEGATIVE for chest pain, palpitations or peripheral edema  GI: NEGATIVE for nausea, abdominal pain, heartburn, or change in bowel habits   male: negative for dysuria, hematuria, decreased urinary stream, erectile dysfunction, urethral discharge  MUSCULOSKELETAL: NEGATIVE for significant arthralgias or myalgia  NEURO: NEGATIVE for weakness, dizziness or paresthesias  PSYCHIATRIC: NEGATIVE for changes in mood or affect    OBJECTIVE:   Pulse 61   Temp 97  F (36.1  C) (Temporal)   Resp 16   Ht 1.854 m (6' 0.99\")   Wt 121 kg (266 lb 12.8 oz)   SpO2 98%   BMI 35.21 kg/m      Physical Exam  GENERAL: healthy, alert and no distress  EYES: Eyes grossly normal to inspection, PERRL and conjunctivae and sclerae normal  HENT: ear canals and TM's normal, nose and mouth without ulcers or lesions  NECK: no adenopathy, no asymmetry, masses, or scars and thyroid normal to palpation  RESP: lungs clear to auscultation - no rales, rhonchi or wheezes  CV: regular rate and rhythm, normal S1 S2, no S3 or S4, no murmur, click or rub, no peripheral edema and peripheral pulses strong  ABDOMEN: soft, nontender, no " hepatosplenomegaly, no masses and bowel sounds normal   (male): no hernias  RECTAL: deferred  MS: no gross musculoskeletal defects noted, no edema  SKIN: no suspicious lesions or rashes  NEURO: Normal strength and tone, mentation intact and speech normal  PSYCH: mentation appears normal, affect normal/bright    Diagnostic Test Results:  Labs reviewed in Epic  Results for orders placed or performed in visit on 02/07/22   Prostate Specific Antigen Screen     Status: Normal   Result Value Ref Range    Prostate Specific Antigen Screen 2.00 0.00 - 4.00 ug/L   TSH with free T4 reflex     Status: Normal   Result Value Ref Range    TSH 1.73 0.40 - 4.00 mU/L   Hemoglobin A1c     Status: Abnormal   Result Value Ref Range    Hemoglobin A1C 6.2 (H) 0.0 - 5.6 %   Lipid panel reflex to direct LDL Fasting     Status: Abnormal   Result Value Ref Range    Cholesterol 180 <200 mg/dL    Triglycerides 113 <150 mg/dL    Direct Measure HDL 50 >=40 mg/dL    LDL Cholesterol Calculated 107 (H) <=100 mg/dL    Non HDL Cholesterol 130 (H) <130 mg/dL    Patient Fasting > 8hrs? Yes     Narrative    Cholesterol  Desirable:  <200 mg/dL    Triglycerides  Normal:  Less than 150 mg/dL  Borderline High:  150-199 mg/dL  High:  200-499 mg/dL  Very High:  Greater than or equal to 500 mg/dL    Direct Measure HDL  Female:  Greater than or equal to 50 mg/dL   Male:  Greater than or equal to 40 mg/dL    LDL Cholesterol  Desirable:  <100mg/dL  Above Desirable:  100-129 mg/dL   Borderline High:  130-159 mg/dL   High:  160-189 mg/dL   Very High:  >= 190 mg/dL    Non HDL Cholesterol  Desirable:  130 mg/dL  Above Desirable:  130-159 mg/dL  Borderline High:  160-189 mg/dL  High:  190-219 mg/dL  Very High:  Greater than or equal to 220 mg/dL   CBC with platelets and differential     Status: None   Result Value Ref Range    WBC Count 7.6 4.0 - 11.0 10e3/uL    RBC Count 5.58 4.40 - 5.90 10e6/uL    Hemoglobin 15.1 13.3 - 17.7 g/dL    Hematocrit 44.1 40.0 - 53.0 %     MCV 79 78 - 100 fL    MCH 27.1 26.5 - 33.0 pg    MCHC 34.2 31.5 - 36.5 g/dL    RDW 14.0 10.0 - 15.0 %    Platelet Count 198 150 - 450 10e3/uL    % Neutrophils 54 %    % Lymphocytes 32 %    % Monocytes 11 %    % Eosinophils 2 %    % Basophils 0 %    Absolute Neutrophils 4.1 1.6 - 8.3 10e3/uL    Absolute Lymphocytes 2.4 0.8 - 5.3 10e3/uL    Absolute Monocytes 0.8 0.0 - 1.3 10e3/uL    Absolute Eosinophils 0.2 0.0 - 0.7 10e3/uL    Absolute Basophils 0.0 0.0 - 0.2 10e3/uL   CBC with Platelets & Differential     Status: None    Narrative    The following orders were created for panel order CBC with Platelets & Differential.  Procedure                               Abnormality         Status                     ---------                               -----------         ------                     CBC with platelets and d...[[id number]]                      Final result                 Please view results for these tests on the individual orders.       ASSESSMENT/PLAN:       ICD-10-CM    1. Routine general medical examination at a health care facility  Z00.00 CBC with Platelets & Differential     Lipid panel reflex to direct LDL Fasting     Hemoglobin A1c     TSH with free T4 reflex     Prostate Specific Antigen Screen     COLOGUARD([employer])     Prostate Specific Antigen Screen     TSH with free T4 reflex     Hemoglobin A1c     Lipid panel reflex to direct LDL Fasting     CBC with Platelets & Differential   2. Screen for colon cancer  Z12.11 COLOGUARD([employer])   3. Mood disorder (H)  F39    4. Morbid obesity (H)  E66.01        Patient has been advised of split billing requirements and indicates understanding: Yes    COUNSELING:   Reviewed preventive health counseling, as reflected in patient instructions       Regular exercise       Healthy diet/nutrition       Vision screening       Immunizations    See orders             Colorectal cancer screening       Prostate cancer screening       Pre-diabetes  "care/prevention.     Estimated body mass index is 35.21 kg/m  as calculated from the following:    Height as of this encounter: 1.854 m (6' 0.99\").    Weight as of this encounter: 121 kg (266 lb 12.8 oz).     Weight management plan: Discussed healthy diet and exercise guidelines    He reports that he has never smoked. He has never used smokeless tobacco.      Counseling Resources:  ATP IV Guidelines  Pooled Cohorts Equation Calculator  FRAX Risk Assessment  ICSI Preventive Guidelines  Dietary Guidelines for Americans, 2010  USDA's MyPlate  ASA Prophylaxis  Lung CA Screening    DAWOOD Cody CNP  [facility] [place]  "

## 2022-02-07 ENCOUNTER — OFFICE VISIT (OUTPATIENT)
Dept: FAMILY MEDICINE | Facility: CLINIC | Age: 57
End: 2022-02-07
Payer: COMMERCIAL

## 2022-02-07 VITALS
HEART RATE: 61 BPM | OXYGEN SATURATION: 98 % | BODY MASS INDEX: 35.36 KG/M2 | HEIGHT: 73 IN | WEIGHT: 266.8 LBS | TEMPERATURE: 97 F | RESPIRATION RATE: 16 BRPM

## 2022-02-07 DIAGNOSIS — Z00.00 ROUTINE GENERAL MEDICAL EXAMINATION AT A HEALTH CARE FACILITY: Primary | ICD-10-CM

## 2022-02-07 DIAGNOSIS — Z12.11 SCREEN FOR COLON CANCER: ICD-10-CM

## 2022-02-07 DIAGNOSIS — F39 MOOD DISORDER (H): ICD-10-CM

## 2022-02-07 DIAGNOSIS — Z71.89 ADVANCED DIRECTIVES, COUNSELING/DISCUSSION: ICD-10-CM

## 2022-02-07 DIAGNOSIS — E66.01 MORBID OBESITY (H): ICD-10-CM

## 2022-02-07 LAB
BASOPHILS # BLD AUTO: 0 10E3/UL (ref 0–0.2)
BASOPHILS NFR BLD AUTO: 0 %
CHOLEST SERPL-MCNC: 180 MG/DL
EOSINOPHIL # BLD AUTO: 0.2 10E3/UL (ref 0–0.7)
EOSINOPHIL NFR BLD AUTO: 2 %
ERYTHROCYTE [DISTWIDTH] IN BLOOD BY AUTOMATED COUNT: 14 % (ref 10–15)
FASTING STATUS PATIENT QL REPORTED: YES
HBA1C MFR BLD: 6.2 % (ref 0–5.6)
HCT VFR BLD AUTO: 44.1 % (ref 40–53)
HDLC SERPL-MCNC: 50 MG/DL
HGB BLD-MCNC: 15.1 G/DL (ref 13.3–17.7)
LDLC SERPL CALC-MCNC: 107 MG/DL
LYMPHOCYTES # BLD AUTO: 2.4 10E3/UL (ref 0.8–5.3)
LYMPHOCYTES NFR BLD AUTO: 32 %
MCH RBC QN AUTO: 27.1 PG (ref 26.5–33)
MCHC RBC AUTO-ENTMCNC: 34.2 G/DL (ref 31.5–36.5)
MCV RBC AUTO: 79 FL (ref 78–100)
MONOCYTES # BLD AUTO: 0.8 10E3/UL (ref 0–1.3)
MONOCYTES NFR BLD AUTO: 11 %
NEUTROPHILS # BLD AUTO: 4.1 10E3/UL (ref 1.6–8.3)
NEUTROPHILS NFR BLD AUTO: 54 %
NONHDLC SERPL-MCNC: 130 MG/DL
PLATELET # BLD AUTO: 198 10E3/UL (ref 150–450)
PSA SERPL-MCNC: 2 UG/L (ref 0–4)
RBC # BLD AUTO: 5.58 10E6/UL (ref 4.4–5.9)
TRIGL SERPL-MCNC: 113 MG/DL
TSH SERPL DL<=0.005 MIU/L-ACNC: 1.73 MU/L (ref 0.4–4)
WBC # BLD AUTO: 7.6 10E3/UL (ref 4–11)

## 2022-02-07 PROCEDURE — 85025 COMPLETE CBC W/AUTO DIFF WBC: CPT | Performed by: NURSE PRACTITIONER

## 2022-02-07 PROCEDURE — 80061 LIPID PANEL: CPT | Performed by: NURSE PRACTITIONER

## 2022-02-07 PROCEDURE — 84443 ASSAY THYROID STIM HORMONE: CPT | Performed by: NURSE PRACTITIONER

## 2022-02-07 PROCEDURE — 83036 HEMOGLOBIN GLYCOSYLATED A1C: CPT | Performed by: NURSE PRACTITIONER

## 2022-02-07 PROCEDURE — G0103 PSA SCREENING: HCPCS | Performed by: NURSE PRACTITIONER

## 2022-02-07 PROCEDURE — 36415 COLL VENOUS BLD VENIPUNCTURE: CPT | Performed by: NURSE PRACTITIONER

## 2022-02-07 PROCEDURE — 99396 PREV VISIT EST AGE 40-64: CPT | Performed by: NURSE PRACTITIONER

## 2022-02-07 ASSESSMENT — MIFFLIN-ST. JEOR: SCORE: 2098.95

## 2022-02-08 ASSESSMENT — ANXIETY QUESTIONNAIRES
1. FEELING NERVOUS, ANXIOUS, OR ON EDGE: SEVERAL DAYS
5. BEING SO RESTLESS THAT IT IS HARD TO SIT STILL: SEVERAL DAYS
2. NOT BEING ABLE TO STOP OR CONTROL WORRYING: SEVERAL DAYS
3. WORRYING TOO MUCH ABOUT DIFFERENT THINGS: SEVERAL DAYS
7. FEELING AFRAID AS IF SOMETHING AWFUL MIGHT HAPPEN: SEVERAL DAYS
6. BECOMING EASILY ANNOYED OR IRRITABLE: SEVERAL DAYS
GAD7 TOTAL SCORE: 7

## 2022-02-08 ASSESSMENT — PATIENT HEALTH QUESTIONNAIRE - PHQ9
SUM OF ALL RESPONSES TO PHQ QUESTIONS 1-9: 5
5. POOR APPETITE OR OVEREATING: SEVERAL DAYS

## 2022-02-09 ENCOUNTER — TELEPHONE (OUTPATIENT)
Dept: FAMILY MEDICINE | Facility: CLINIC | Age: 57
End: 2022-02-09
Payer: COMMERCIAL

## 2022-02-09 DIAGNOSIS — R73.03 PREDIABETES: Primary | ICD-10-CM

## 2022-02-09 ASSESSMENT — ANXIETY QUESTIONNAIRES: GAD7 TOTAL SCORE: 7

## 2022-02-09 NOTE — TELEPHONE ENCOUNTER
I called and notified patient of his lab results.     Patient is wondering if the next recheck in 3-6 months has to be an OV with Sangeetha, or if he can do a lab only appt. States that it will be much easier to do a lab only appt since he would not need to take time off work.     Routing to PCP to review.    Sangeetha- if you are okay with a lab only appt, can you please place future orders?     Reinaldo Bronson MA on 2/9/2022 at 9:27 AM

## 2022-02-10 NOTE — TELEPHONE ENCOUNTER
Patient has been informed he states he will call back to schedule once it is closer  Closing encounter  Michelle Heath RT (R)

## 2022-02-23 LAB — COLOGUARD-ABSTRACT: NEGATIVE

## 2022-02-25 ENCOUNTER — TELEPHONE (OUTPATIENT)
Dept: FAMILY MEDICINE | Facility: CLINIC | Age: 57
End: 2022-02-25
Payer: COMMERCIAL

## 2022-02-25 ENCOUNTER — ALLIED HEALTH/NURSE VISIT (OUTPATIENT)
Dept: FAMILY MEDICINE | Facility: CLINIC | Age: 57
End: 2022-02-25
Payer: COMMERCIAL

## 2022-02-25 VITALS — SYSTOLIC BLOOD PRESSURE: 134 MMHG | HEART RATE: 60 BPM | DIASTOLIC BLOOD PRESSURE: 92 MMHG

## 2022-02-25 DIAGNOSIS — Z01.30 BP CHECK: Primary | ICD-10-CM

## 2022-02-25 PROCEDURE — 99207 PR NO CHARGE NURSE ONLY: CPT

## 2022-02-25 NOTE — TELEPHONE ENCOUNTER
Reason for Call:  Other appointment -Needing blood pressure checked with a nurse    Detailed comments: Patient stating his primary told him to come into the clinic and get his blood pressure checked. Patient stating he has tried, they told him he needs an appointment. Patient stating he has tried twice and work schedule has been to busy to allow him the time to call earlier in the day. Requesting late end of day appointment    Phone Number Patient can be reached at: Cell number on file:    Telephone Information:   Mobile [phone]       Best Time: anytime    Can we leave a detailed message on this number? YES    Call taken on 2/25/2022 at 2:07 PM by Jyotsna Sanders LPN

## 2022-02-25 NOTE — PROGRESS NOTES
Trung Dubois is a 56 year old patient who comes in today for a Blood Pressure check.  Initial BP:  BP (!) 134/92   Pulse 60      60  Disposition: follow-up as previously indicated by provider and results routed to provider    Patient's first blood pressure was 144/84. Patient states he has been active - has been taking dog for walks at least 3 times a week, has been shoveling snow, and working on feet for the past week.    He has been going to [facility] to check blood pressure checks and blood pressure has been under 140/75-80

## 2022-02-26 ENCOUNTER — TELEPHONE (OUTPATIENT)
Dept: FAMILY MEDICINE | Facility: CLINIC | Age: 57
End: 2022-02-26
Payer: COMMERCIAL

## 2022-02-26 DIAGNOSIS — I10 PRIMARY HYPERTENSION: Primary | ICD-10-CM

## 2022-02-26 RX ORDER — LISINOPRIL 20 MG/1
20 TABLET ORAL DAILY
Qty: 30 TABLET | Refills: 1 | Status: SHIPPED | OUTPATIENT
Start: 2022-02-28 | End: 2023-01-30

## 2022-02-26 NOTE — TELEPHONE ENCOUNTER
Call pt.   As BP is high recommend starting Lisinopril.   If continues with life changes, can consider coming off.   Lisinopril 20 mg sent to pharmacy.   OV in 2 weeks advised.   DAWOOD Cody CNP

## 2022-03-07 NOTE — RESULT ENCOUNTER NOTE
Please inform of results if patient has not viewed in [employer].    Your cologuard lab results (a screening test for colon cancer) came back normal.    Please call the clinic with any questions you may have.     Have a great day,    Dr. Delaney

## 2022-03-11 NOTE — TELEPHONE ENCOUNTER
Pt called with a updated bp taken yesterday at the [employer] before his son's graduation.  138/86

## 2022-03-14 NOTE — TELEPHONE ENCOUNTER
OK- please advise check in clinic later this week with MA- want to see if any lower with some weight loss.   DAWOOD Cody CNP

## 2022-05-31 ENCOUNTER — TELEPHONE (OUTPATIENT)
Dept: FAMILY MEDICINE | Facility: CLINIC | Age: 57
End: 2022-05-31
Payer: COMMERCIAL

## 2022-05-31 DIAGNOSIS — M25.562 LEFT KNEE PAIN, UNSPECIFIED CHRONICITY: Primary | ICD-10-CM

## 2022-05-31 NOTE — TELEPHONE ENCOUNTER
"Patient is experiencing left knee pain, he would like to be seen by you, but cannot get in until July 18 (next available).  He would be willing to see orthopedics as this seems to be a chronic condition.  He feels like his 'knee cap' is not stable, states that it \"diappears on him and then he walks with his foot turned in order to continue to work\".  He would like to have this looked at sooner rather than later.    Can you work him in or send in a referral for orthopedics.  Please call patient [phone] with appointment or information regarding referral.  Message can be left    Alejandra HERNANDEZ/  "

## 2022-06-03 NOTE — TELEPHONE ENCOUNTER
DIAGNOSIS: L knee pain   APPOINTMENT DATE: 06/09/2022   NOTES STATUS DETAILS   OFFICE NOTE from referring provider Internal 05/31/2022 telephone encounter Sangeetha Salazar [facility]    OFFICE NOTE from other specialist Internal 07/28/2011 Dr Glass [facility]    DISCHARGE SUMMARY from hospital N/A    DISCHARGE REPORT from the ER N/A    OPERATIVE REPORT N/A    MEDICATION LIST N/A    EMG (for Spine) N/A    IMPLANT RECORD/STICKER N/A    LABS     CBC/DIFF N/A    CULTURES N/A    INJECTIONS DONE IN RADIOLOGY N/A    MRI N/A    CT SCAN N/A    XRAYS (IMAGES & REPORTS) Internal 07/30/2011 LFT knee   TUMOR     PATHOLOGY  Slides & report N/A

## 2022-06-06 DIAGNOSIS — M25.562 LEFT KNEE PAIN: Primary | ICD-10-CM

## 2022-06-09 ENCOUNTER — OFFICE VISIT (OUTPATIENT)
Dept: ORTHOPEDICS | Facility: CLINIC | Age: 57
End: 2022-06-09
Payer: COMMERCIAL

## 2022-06-09 ENCOUNTER — ANCILLARY PROCEDURE (OUTPATIENT)
Dept: MRI IMAGING | Facility: CLINIC | Age: 57
End: 2022-06-09
Attending: ORTHOPAEDIC SURGERY
Payer: COMMERCIAL

## 2022-06-09 ENCOUNTER — PRE VISIT (OUTPATIENT)
Dept: ORTHOPEDICS | Facility: CLINIC | Age: 57
End: 2022-06-09
Payer: COMMERCIAL

## 2022-06-09 VITALS — SYSTOLIC BLOOD PRESSURE: 132 MMHG | DIASTOLIC BLOOD PRESSURE: 74 MMHG

## 2022-06-09 DIAGNOSIS — M25.562 LEFT KNEE PAIN, UNSPECIFIED CHRONICITY: Primary | ICD-10-CM

## 2022-06-09 PROCEDURE — 73721 MRI JNT OF LWR EXTRE W/O DYE: CPT | Mod: LT | Performed by: RADIOLOGY

## 2022-06-09 PROCEDURE — 99203 OFFICE O/P NEW LOW 30 MIN: CPT | Mod: 57 | Performed by: ORTHOPAEDIC SURGERY

## 2022-06-09 ASSESSMENT — PAIN SCALES - GENERAL: PAINLEVEL: SEVERE PAIN (7)

## 2022-06-09 NOTE — NURSING NOTE
Reason For Visit:   Chief Complaint   Patient presents with     Left Knee - New Patient, Pain       ?  No  Occupation truck drive.  Currently working? Yes.  Work status?  Full time.  Date of injury: years ago knee spur  Type of injury: fall.  Date of surgery: NA  Type of surgery: NA.  Smoker: No  Request smoking cessation information: No    Sane Score  Left knee - Affected  Left Knee- 40  Right Knee- 90    Daniel Rodriguez, EMT

## 2022-06-09 NOTE — LETTER
6/9/2022         RE: Trung Dubois  [address] [address] [facility] [address]        Dear Colleague,    Thank you for referring your patient, Trung Dubois, to the [facility]. Please see a copy of my visit note below.    CHIEF CONCERN: Left knee pain, work related injury    HISTORY:   Very pleasant 57-year-old male.  Comes in to see me today for left knee pain.  Work-related injury.  He states that he has had a history of on and off knee pain in the past.  He is always treated with ibuprofen and icing which relieved all of his symptoms.  Has been able do all that he wants to do.  He had an episode where he fell out of the back of his work truck.  Directly onto his knee.  He has had pain since that time.  It keeps him from doing the things that he wants to do.  He does not feel confident in his knee.  He has had swelling and effusion.It bothers him on a daily basis.  He has some concerns about it not being stable.  Though he definitely has problems with swelling.    PAST MEDICAL HISTORY: (Reviewed with the patient and in the [facility] medical record)  1. Hypertension, prediabetes    PAST SURGICAL HISTORY: (Reviewed with the patient and in the [facility] medical record)  1. No knee surgery    MEDICATIONS: (Reviewed with the patient and in the [facility] medical record)    Notable medications include: No blood thinners or opioids    ALLERGIES: (Reviewed with the patient and in the [facility] medical record)  1. Prevacid      SOCIAL HISTORY: (Reviewed with the patient and in the medical record)  --Tobacco: Non-smoker  --Occupation:   --Avocation/Sport: He enjoys traveling    FAMILY HISTORY: (Reviewed with the patient and in the medical record)  -- No family history of bleeding, clotting, or difficulty with anesthesia    REVIEW OF SYSTEMS: (Reviewed with the patient and on the health intake form)  -- A comprehensive 10 point review of systems was conducted and is negative except as noted in the  HPI    EXAM:     General: Awake, Alert and Oriented, No acute Distress. Articulate and Interactive    There is no height or weight on file to calculate BMI.    Left lower extremity :    Skin is Warm and Well perfused, no suggestion of infection    1+ effusion    Stable to varus valgus stress testing    Lachman 0, no pivot shift stable posterior drawer    1 quadrant medial lateral translation of patella    Positive pain with compression of the patella    EHL/FHL/TA/GS 5/5    Sensation intact L3-S1    2+ Dorsalis Pedis Pulse    IMAGING:    Plain Radiographs: No fractures dislocations well-preserved joint space    MRI: None    ASSESSMENT:  1. Left knee pain following work-related fall    PLAN:  1. MRI left knee.  Follow-up afterwards          Again, thank you for allowing me to participate in the care of your patient.        Sincerely,        Daniel Rios MD

## 2022-06-09 NOTE — PROGRESS NOTES
CHIEF CONCERN: Left knee pain, work related injury    HISTORY:   Very pleasant 57-year-old male.  Comes in to see me today for left knee pain.  Work-related injury.  He states that he has had a history of on and off knee pain in the past.  He is always treated with ibuprofen and icing which relieved all of his symptoms.  Has been able do all that he wants to do.  He had an episode where he fell out of the back of his work truck.  Directly onto his knee.  He has had pain since that time.  It keeps him from doing the things that he wants to do.  He does not feel confident in his knee.  He has had swelling and effusion.It bothers him on a daily basis.  He has some concerns about it not being stable.  Though he definitely has problems with swelling.    PAST MEDICAL HISTORY: (Reviewed with the patient and in the [facility] medical record)  1. Hypertension, prediabetes    PAST SURGICAL HISTORY: (Reviewed with the patient and in the [facility] medical record)  1. No knee surgery    MEDICATIONS: (Reviewed with the patient and in the [facility] medical record)    Notable medications include: No blood thinners or opioids    ALLERGIES: (Reviewed with the patient and in the [facility] medical record)  1. Prevacid      SOCIAL HISTORY: (Reviewed with the patient and in the medical record)  --Tobacco: Non-smoker  --Occupation:   --Avocation/Sport: He enjoys traveling    FAMILY HISTORY: (Reviewed with the patient and in the medical record)  -- No family history of bleeding, clotting, or difficulty with anesthesia    REVIEW OF SYSTEMS: (Reviewed with the patient and on the health intake form)  -- A comprehensive 10 point review of systems was conducted and is negative except as noted in the HPI    EXAM:     General: Awake, Alert and Oriented, No acute Distress. Articulate and Interactive    There is no height or weight on file to calculate BMI.    Left lower extremity :    Skin is Warm and Well perfused, no suggestion of infection    1+  effusion    Stable to varus valgus stress testing    Lachman 0, no pivot shift stable posterior drawer    1 quadrant medial lateral translation of patella    Positive pain with compression of the patella    EHL/FHL/TA/GS 5/5    Sensation intact L3-S1    2+ Dorsalis Pedis Pulse    IMAGING:    Plain Radiographs: No fractures dislocations well-preserved joint space    MRI: None    ASSESSMENT:  1. Left knee pain following work-related fall    PLAN:  1. MRI left knee.  Follow-up afterwards

## 2022-06-10 ENCOUNTER — TELEPHONE (OUTPATIENT)
Dept: NURSING | Facility: CLINIC | Age: 57
End: 2022-06-10
Payer: COMMERCIAL

## 2022-06-10 NOTE — TELEPHONE ENCOUNTER
Note to provider:    Caller name: Bob  Relation to patient: self    Pt is requesting a call from provider/staff with results of MRI testing from yesterday. He reports that he was able to see the results in MyChart but does not understanding the information and would like the provider's interpretation of scans and plans for moving forward with treatment.    Pt states he can be reached at [phone]; OK to leave a detailed msg.      Angelika Troy RN   06/10/22 5:42 AM  [facility] Nurse Advisor

## 2022-06-15 ENCOUNTER — VIRTUAL VISIT (OUTPATIENT)
Dept: ORTHOPEDICS | Facility: CLINIC | Age: 57
End: 2022-06-15
Payer: COMMERCIAL

## 2022-06-15 DIAGNOSIS — G89.29 CHRONIC PAIN OF LEFT KNEE: Primary | ICD-10-CM

## 2022-06-15 DIAGNOSIS — M25.562 CHRONIC PAIN OF LEFT KNEE: Primary | ICD-10-CM

## 2022-06-15 PROCEDURE — 99214 OFFICE O/P EST MOD 30 MIN: CPT | Mod: 95 | Performed by: ORTHOPAEDIC SURGERY

## 2022-06-15 NOTE — LETTER
6/15/2022         RE: Trung Dubois  [address] [address] [place] [id number]        Dear Colleague,    Thank you for referring your patient, Trung Dubois, to the [facility] [place]. Please see a copy of my visit note below.    Bob is a 57 year old who is being evaluated via a billable telephone visit.      I the opportunity to a telephone visit with Bob today regarding his knee MRI.  He denies any intercurrent change in his symptoms.  He still notes knee pain.  This did stem from a work-related injury when he fell off the back of the truck.    His MRI does demonstrate a tear of the posterior horn of the lateral meniscus.  I do not think this represents a true meniscus root tear and I do not think that the rate is been destabilized.  He also has high-grade chondrosis of the patellofemoral compartment.  I think this has been exacerbated secondary to his fall as he did not really have any pain before this work-related injury.    I discussed with the patient the pros cons risks and benefits of surgery.  We discussed the expected course of recovery.  Our plan is going to be to consideration of a corticosteroid injection to his left knee.  We will then plan to see how he does.  If he sees good improvement then no further intervention is necessary.  If he has continued symptoms or may we may ultimately consider arthroscopic evaluation.  I discussed with him the difference in his symptoms between his meniscus tear and his cartilages and he is agreeable to starting with an injection.    What phone number would you like to be contacted at? [phone]  How would you like to obtain your AVS? Nida  Phone call duration: 10 minutes        Again, thank you for allowing me to participate in the care of your patient.        Sincerely,        Daniel Rios MD

## 2022-06-15 NOTE — LETTER
6/15/2022       RE: Trung Dubois  [address] [address] [address]     Dear Colleague,    Thank you for referring your patient, Trung Dubois, to the [facility] [place] [facility]. Please see a copy of my visit note below.    Bob is a 57 year old who is being evaluated via a billable telephone visit.      I the opportunity to a telephone visit with Bob today regarding his knee MRI.  He denies any intercurrent change in his symptoms.  He still notes knee pain.  This did stem from a work-related injury when he fell off the back of the truck.    His MRI does demonstrate a tear of the posterior horn of the lateral meniscus.  I do not think this represents a true meniscus root tear and I do not think that the rate is been destabilized.  He also has high-grade chondrosis of the patellofemoral compartment.  I think this has been exacerbated secondary to his fall as he did not really have any pain before this work-related injury.    I discussed with the patient the pros cons risks and benefits of surgery.  We discussed the expected course of recovery.  Our plan is going to be to consideration of a corticosteroid injection to his left knee.  We will then plan to see how he does.  If he sees good improvement then no further intervention is necessary.  If he has continued symptoms or may we may ultimately consider arthroscopic evaluation.  I discussed with him the difference in his symptoms between his meniscus tear and his cartilages and he is agreeable to starting with an injection.    What phone number would you like to be contacted at? [phone]  How would you like to obtain your AVS? Nida  Phone call duration: 10 minutes        Again, thank you for allowing me to participate in the care of your patient.      Sincerely,    Daniel Rios MD

## 2022-06-15 NOTE — PROGRESS NOTES
Bob is a 57 year old who is being evaluated via a billable telephone visit.      I the opportunity to a telephone visit with Bob today regarding his knee MRI.  He denies any intercurrent change in his symptoms.  He still notes knee pain.  This did stem from a work-related injury when he fell off the back of the truck.    His MRI does demonstrate a tear of the posterior horn of the lateral meniscus.  I do not think this represents a true meniscus root tear and I do not think that the rate is been destabilized.  He also has high-grade chondrosis of the patellofemoral compartment.  I think this has been exacerbated secondary to his fall as he did not really have any pain before this work-related injury.    I discussed with the patient the pros cons risks and benefits of surgery.  We discussed the expected course of recovery.  Our plan is going to be to consideration of a corticosteroid injection to his left knee.  We will then plan to see how he does.  If he sees good improvement then no further intervention is necessary.  If he has continued symptoms or may we may ultimately consider arthroscopic evaluation.  I discussed with him the difference in his symptoms between his meniscus tear and his cartilages and he is agreeable to starting with an injection.    What phone number would you like to be contacted at? [phone]  How would you like to obtain your AVS? Nida  Phone call duration: 10 minutes

## 2022-06-23 ENCOUNTER — OFFICE VISIT (OUTPATIENT)
Dept: ORTHOPEDICS | Facility: CLINIC | Age: 57
End: 2022-06-23
Payer: COMMERCIAL

## 2022-06-23 DIAGNOSIS — G89.29 CHRONIC PAIN OF LEFT KNEE: Primary | ICD-10-CM

## 2022-06-23 DIAGNOSIS — M25.562 CHRONIC PAIN OF LEFT KNEE: Primary | ICD-10-CM

## 2022-06-23 PROCEDURE — 20610 DRAIN/INJ JOINT/BURSA W/O US: CPT | Mod: LT | Performed by: ORTHOPAEDIC SURGERY

## 2022-06-23 PROCEDURE — 99207 PR DROP WITH A PROCEDURE: CPT | Mod: 25 | Performed by: ORTHOPAEDIC SURGERY

## 2022-06-23 RX ADMIN — TRIAMCINOLONE ACETONIDE 40 MG: 40 INJECTION, SUSPENSION INTRA-ARTICULAR; INTRAMUSCULAR at 15:01

## 2022-06-23 ASSESSMENT — PAIN SCALES - GENERAL: PAINLEVEL: MILD PAIN (2)

## 2022-06-23 NOTE — NURSING NOTE
Reason For Visit:   Chief Complaint   Patient presents with     Left Knee - Follow Up       ?  No  Occupation truck drive.  Currently working? Yes.  Work status?  Full time.  Date of injury: years ago knee spur  Type of injury: fall.  Date of surgery: NA  Type of surgery: NA.  Smoker: No  Request smoking cessation information: No     Sane Score  Left knee - Affected  Left Knee- 40  Right Knee- 90    Daniel Rodriguez, EMT

## 2022-06-23 NOTE — PROGRESS NOTES
Trung Dubois  is a pleasant 57-year-old male I did a telephone visit with him on 6/15/2022.  This was to review his MRI results.  The MRI did demonstrate a tear of the posterior horn of the lateral meniscus he also has known chondrosis of the patellofemoral compartment.  His injury was secondary to a fall while at work.  On Carole 15 we had a telephone visit we discussed the MRI results we made plans Albiero to try to exhaust nonsurgical intervention first with a corticosteroid injection.  He comes back to my clinic today for the steroid injection.    Examination shows a pleasant male no acute distress articulate and interactive.  Visual inspection shows trace to 1+ effusion.  Ligaments stable.  Neurovascular intact.    Clinical assessment: Posterior horn lateral meniscus tear, patellofemoral chondrosis    Plan: Long discussion with the patient.  Offered him injection.  He accepted.    After written informed consent obtained and signed, after sufficient prepping and sterile technique, 40 mg of kenalog and , 8 cc of 1% lidocaine were injected without complication into the left knee. The patient tolerated the injection well and a sterile dressing was applied.         Large Joint Injection/Arthocentesis: L knee joint    Date/Time: 6/23/2022 3:01 PM  Performed by: Daniel Rios MD  Authorized by: Daniel Rios MD     Indications:  Pain  Needle Size:  22 G  Guidance: landmark guided    Approach:  Anterolateral  Location:  Knee      Medications:  40 mg triamcinolone 40 MG/ML  Outcome:  Tolerated well, no immediate complications  Consent Given by:  Patient  Timeout: timeout called immediately prior to procedure    Prep: patient was prepped and draped in usual sterile fashion

## 2022-06-23 NOTE — LETTER
6/23/2022         RE: Trung Dubois  [address] [address] [facility] [address]        Dear Colleague,    Thank you for referring your patient, Trung Dubois, to the [facility]. Please see a copy of my visit note below.    Trung Dubois  is a pleasant 57-year-old male I did a telephone visit with him on 6/15/2022.  This was to review his MRI results.  The MRI did demonstrate a tear of the posterior horn of the lateral meniscus he also has known chondrosis of the patellofemoral compartment.  His injury was secondary to a fall while at work.  On Carole 15 we had a telephone visit we discussed the MRI results we made plans Albiero to try to exhaust nonsurgical intervention first with a corticosteroid injection.  He comes back to my clinic today for the steroid injection.    Examination shows a pleasant male no acute distress articulate and interactive.  Visual inspection shows trace to 1+ effusion.  Ligaments stable.  Neurovascular intact.    Clinical assessment: Posterior horn lateral meniscus tear, patellofemoral chondrosis    Plan: Long discussion with the patient.  Offered him injection.  He accepted.    After written informed consent obtained and signed, after sufficient prepping and sterile technique, 40 mg of kenalog and , 8 cc of 1% lidocaine were injected without complication into the left knee. The patient tolerated the injection well and a sterile dressing was applied.         Large Joint Injection/Arthocentesis: L knee joint    Date/Time: 6/23/2022 3:01 PM  Performed by: Daniel Rios MD  Authorized by: Daniel Rios MD     Indications:  Pain  Needle Size:  22 G  Guidance: landmark guided    Approach:  Anterolateral  Location:  Knee      Medications:  40 mg triamcinolone 40 MG/ML  Outcome:  Tolerated well, no immediate complications  Consent Given by:  Patient  Timeout: timeout called immediately prior to procedure    Prep: patient was  prepped and draped in usual sterile fashion            Again, thank you for allowing me to participate in the care of your patient.        Sincerely,        Daniel Rios MD

## 2022-06-23 NOTE — NURSING NOTE
[facility]   ORTHOPEDICS & SPORTS MEDICINE  [address] [place] [address]  Dept: [phone]  ______________________________________________________________________________    Patient: Trung Dubois   : 1965   MRN: [id number]   2022    INVASIVE PROCEDURE SAFETY CHECKLIST    Date: 2022   Procedure:Left knee Kenalog injection  Patient Name: Trung Dubois  MRN: [id number]  YOB: 1965    Action: Complete sections as appropriate. Any discrepancy results in a HARD COPY until resolved.     PRE PROCEDURE:  Patient ID verified with 2 identifiers (name and  or MRN): Yes  Procedure and site verified with patient/designee (when able): Yes  Accurate consent documentation in medical record: Yes  H&P (or appropriate assessment) documented in medical record: Yes  H&P must be up to 20 days prior to procedure and updates within 24 hours of procedure as applicable: NA  Relevant diagnostic and radiology test results appropriately labeled and displayed as applicable: NA  Procedure site(s) marked with provider initials: Yes    TIMEOUT:  Time-Out performed immediately prior to starting procedure, including verbal and active participation of all team members addressing the following:Yes  * Correct patient identify  * Confirmed that the correct side and site are marked  * An accurate procedure consent form  * Agreement on the procedure to be done  * Correct patient position  * Relevant images and results are properly labeled and appropriately displayed  * The need to administer antibiotics or fluids for irrigation purposes during the procedure as applicable   * Safety precautions based on patient history or medication use    DURING PROCEDURE: Verification of correct person, site, and procedures any time the responsibility for care of the patient is transferred to another member of the care team.       Prior to injection, verified patient identity using patient's name and  date of birth.  Due to injection administration, patient instructed to remain in clinic for 15 minutes  afterwards, and to report any adverse reaction to me immediately.    Joint injection was performed.      Drug Amount Wasted:  None.  Vial/Syringe: Single dose vial  Expiration Date:  2/29/2024      Daniel Rodriguez, EMT  June 23, 2022

## 2022-06-24 RX ORDER — TRIAMCINOLONE ACETONIDE 40 MG/ML
40 INJECTION, SUSPENSION INTRA-ARTICULAR; INTRAMUSCULAR
Status: SHIPPED | OUTPATIENT
Start: 2022-06-23

## 2022-07-02 ENCOUNTER — NURSE TRIAGE (OUTPATIENT)
Dept: NURSING | Facility: CLINIC | Age: 57
End: 2022-07-02

## 2022-07-03 ENCOUNTER — OFFICE VISIT (OUTPATIENT)
Dept: URGENT CARE | Facility: URGENT CARE | Age: 57
End: 2022-07-03
Payer: COMMERCIAL

## 2022-07-03 VITALS
WEIGHT: 267 LBS | DIASTOLIC BLOOD PRESSURE: 84 MMHG | OXYGEN SATURATION: 96 % | SYSTOLIC BLOOD PRESSURE: 125 MMHG | HEART RATE: 94 BPM | RESPIRATION RATE: 19 BRPM | TEMPERATURE: 98.7 F | BODY MASS INDEX: 35.23 KG/M2

## 2022-07-03 DIAGNOSIS — R50.9 FEBRILE ILLNESS, ACUTE: Primary | ICD-10-CM

## 2022-07-03 LAB
ALBUMIN SERPL-MCNC: 3.4 G/DL (ref 3.4–5)
ALP SERPL-CCNC: 94 U/L (ref 40–150)
ALT SERPL W P-5'-P-CCNC: 49 U/L (ref 0–70)
ANION GAP SERPL CALCULATED.3IONS-SCNC: 8 MMOL/L (ref 3–14)
AST SERPL W P-5'-P-CCNC: 40 U/L (ref 0–45)
BASOPHILS # BLD AUTO: 0 10E3/UL (ref 0–0.2)
BASOPHILS NFR BLD AUTO: 1 %
BILIRUB SERPL-MCNC: 0.7 MG/DL (ref 0.2–1.3)
BUN SERPL-MCNC: 22 MG/DL (ref 7–30)
CALCIUM SERPL-MCNC: 8.4 MG/DL (ref 8.5–10.1)
CHLORIDE BLD-SCNC: 104 MMOL/L (ref 94–109)
CO2 SERPL-SCNC: 25 MMOL/L (ref 20–32)
CREAT SERPL-MCNC: 1.04 MG/DL (ref 0.66–1.25)
EOSINOPHIL # BLD AUTO: 0 10E3/UL (ref 0–0.7)
EOSINOPHIL NFR BLD AUTO: 0 %
ERYTHROCYTE [DISTWIDTH] IN BLOOD BY AUTOMATED COUNT: 14.1 % (ref 10–15)
GFR SERPL CREATININE-BSD FRML MDRD: 84 ML/MIN/1.73M2
GLUCOSE BLD-MCNC: 150 MG/DL (ref 70–99)
HBA1C MFR BLD: 6.1 % (ref 0–5.6)
HCT VFR BLD AUTO: 46.8 % (ref 40–53)
HGB BLD-MCNC: 16 G/DL (ref 13.3–17.7)
IMM GRANULOCYTES # BLD: 0 10E3/UL
IMM GRANULOCYTES NFR BLD: 1 %
LIPASE SERPL-CCNC: 274 U/L (ref 73–393)
LYMPHOCYTES # BLD AUTO: 0.7 10E3/UL (ref 0.8–5.3)
LYMPHOCYTES NFR BLD AUTO: 19 %
MCH RBC QN AUTO: 27.8 PG (ref 26.5–33)
MCHC RBC AUTO-ENTMCNC: 34.2 G/DL (ref 31.5–36.5)
MCV RBC AUTO: 81 FL (ref 78–100)
MONOCYTES # BLD AUTO: 0.7 10E3/UL (ref 0–1.3)
MONOCYTES NFR BLD AUTO: 20 %
NEUTROPHILS # BLD AUTO: 2.1 10E3/UL (ref 1.6–8.3)
NEUTROPHILS NFR BLD AUTO: 59 %
NRBC # BLD AUTO: 0 10E3/UL
NRBC BLD AUTO-RTO: 0 /100
PLATELET # BLD AUTO: 82 10E3/UL (ref 150–450)
POTASSIUM BLD-SCNC: 4.4 MMOL/L (ref 3.4–5.3)
PROT SERPL-MCNC: 6.9 G/DL (ref 6.8–8.8)
RBC # BLD AUTO: 5.76 10E6/UL (ref 4.4–5.9)
SODIUM SERPL-SCNC: 137 MMOL/L (ref 133–144)
WBC # BLD AUTO: 3.5 10E3/UL (ref 4–11)

## 2022-07-03 PROCEDURE — U0003 INFECTIOUS AGENT DETECTION BY NUCLEIC ACID (DNA OR RNA); SEVERE ACUTE RESPIRATORY SYNDROME CORONAVIRUS 2 (SARS-COV-2) (CORONAVIRUS DISEASE [COVID-19]), AMPLIFIED PROBE TECHNIQUE, MAKING USE OF HIGH THROUGHPUT TECHNOLOGIES AS DESCRIBED BY CMS-2020-01-R: HCPCS | Performed by: PREVENTIVE MEDICINE

## 2022-07-03 PROCEDURE — 99215 OFFICE O/P EST HI 40 MIN: CPT | Performed by: PREVENTIVE MEDICINE

## 2022-07-03 PROCEDURE — 83036 HEMOGLOBIN GLYCOSYLATED A1C: CPT | Performed by: PREVENTIVE MEDICINE

## 2022-07-03 PROCEDURE — 83690 ASSAY OF LIPASE: CPT | Performed by: PREVENTIVE MEDICINE

## 2022-07-03 PROCEDURE — 80053 COMPREHEN METABOLIC PANEL: CPT | Performed by: PREVENTIVE MEDICINE

## 2022-07-03 PROCEDURE — 85025 COMPLETE CBC W/AUTO DIFF WBC: CPT | Performed by: PREVENTIVE MEDICINE

## 2022-07-03 PROCEDURE — 36415 COLL VENOUS BLD VENIPUNCTURE: CPT | Performed by: PREVENTIVE MEDICINE

## 2022-07-03 PROCEDURE — U0005 INFEC AGEN DETEC AMPLI PROBE: HCPCS | Performed by: PREVENTIVE MEDICINE
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
## 2022-07-03 NOTE — TELEPHONE ENCOUNTER
Triage Call:    Patient calling to report tick bite last week.  He reports he removed tick, but had trouble removing head.  He believes the head has been removed.  Patient reports he has been having headaches, fatigue, and weight loss.  Patient reports 6 pound weight loss in 4 days.  He does reports an increase in stress and inquiring if that could cause his symptoms.    According to the protocol, patient should see physician within 24 hours.  Care advice given including when to call back. Patient verbalizes understanding and agrees with plan of care. Patient plans to go to Urgent Care in morning.    Melanie Johnson RN  07/02/22 10:19 PM  [facility] Nurse Advisor    COVID 19 Nurse Triage Plan/Patient Instructions    Please be aware that novel coronavirus (COVID-19) may be circulating in the community. If you develop symptoms such as fever, cough, or SOB or if you have concerns about the presence of another infection including coronavirus (COVID-19), please contact your health care provider or visit [email or web address].     Disposition/Instructions    In-Person Visit with provider recommended. Reference Visit Selection Guide.    Thank you for taking steps to prevent the spread of this virus.  o Limit your contact with others.  o Wear a simple mask to cover your cough.  o Wash your hands well and often.    Resources    [facility]: About COVID-19: [email or web address]    CDC: What to Do If You're Sick: www.cdc.gov/coronavirus/2019-ncov/about/steps-when-sick.html    CDC: Ending Home Isolation: www.cdc.gov/coronavirus/2019-ncov/hcp/disposition-in-home-patients.html     CDC: Caring for Someone: www.cdc.gov/coronavirus/2019-ncov/if-you-are-sick/care-for-someone.html     [facility]: Interim Guidance for Hospital Discharge to Home: www.health.[facility].mn.us/diseases/coronavirus/hcp/hospdischarge.pdf    [facility] clinical trials (COVID-19 research studies):  clinicalaffairs.[facility]-clinical-trials     Below are the COVID-19 hotlines at the [place] Department of Health ([facility]). Interpreters are available.   o For health questions: Call 014-383-9718 or 1-650.748.7606 (7 a.m. to 7 p.m.)  o For questions about schools and childcare: Call 934-127-3958 or 1-880.744.2490 (7 a.m. to 7 p.m.)       Reason for Disposition    [1] 2 to 14 days following tick bite AND [2] widespread rash or headache AND [3] no fever    Additional Information    Negative: Sounds like a life-threatening emergency to the triager    Negative: Not a tick bite    Negative: [1] 2 to 14 days following tick bite AND [2] severe headache with fever occurs    Negative: [1] 2 to 14 days following tick bite AND [2] widespread rash with fever occurs    Negative: Patient sounds very sick or weak to the triager    Negative: [1] Fever AND [2] red area    Negative: [1] Fever AND [2] area is very tender to touch    Negative: Can't remove live tick (after trying Care Advice)    Negative: [1] Red streak or red line AND [2] length > 2 inches (5 cm)    Negative: Can't remove tick's head that was broken off in the skin (after trying Care Advice)    Negative: [1] 2 to 14 days following tick bite AND [2] fever AND [3] no rash or headache    Protocols used: TICK BITE-A-

## 2022-07-04 LAB — SARS-COV-2 RNA RESP QL NAA+PROBE: NEGATIVE

## 2022-07-05 ENCOUNTER — DOCUMENTATION ONLY (OUTPATIENT)
Dept: FAMILY MEDICINE | Facility: CLINIC | Age: 57
End: 2022-07-05

## 2022-08-01 ENCOUNTER — TELEPHONE (OUTPATIENT)
Dept: FAMILY MEDICINE | Facility: CLINIC | Age: 57
End: 2022-08-01

## 2022-08-01 NOTE — TELEPHONE ENCOUNTER
General Call    Contacts       Type Contact Phone/Fax    08/01/2022 02:33 PM CDT Phone (Incoming) Bob Dubois (Self) [phone] (H)        Reason for Call:  billing    What are your questions or concerns:  Pt upset that he was on hold for over 30 min. Just wants to pay a bill that is work comp covered. Needs to know the amount so he can tell his employer. He is requesting a call back from someone in billing dept.         Could we send this information to you in [facility] or would you prefer to receive a phone call?:   Patient would prefer a phone call   Okay to leave a detailed message?: Yes at Cell number on file:    Telephone Information:   Mobile [phone]

## 2022-10-16 ENCOUNTER — HEALTH MAINTENANCE LETTER (OUTPATIENT)
Age: 57
End: 2022-10-16

## 2022-11-22 NOTE — TELEPHONE ENCOUNTER
I sent an high priority encounter to one of the MDs to have them call Bob.  Molly Avery RN-[facility] Nurse Advisors     Received call back from mom  She received voicemail from Jamila and had few questions regarding Ty's fever    Explained that Ty should be seen if fever > 72hrs, or >104.    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    Recommended appt to swab for RSV, Covid, Influenza if looking for definitive diagnosis   Continue symptom management with encouraging fluids, rest and Ibuprofen/Tylenol    Mom states they live in [place].  May take to  closer to home-will call back if appt needed here.  Dr. Pelayo schedule is full today.

## 2022-12-04 ENCOUNTER — NURSE TRIAGE (OUTPATIENT)
Dept: NURSING | Facility: CLINIC | Age: 57
End: 2022-12-04

## 2022-12-04 NOTE — TELEPHONE ENCOUNTER
Pt is having right sided pain at his belt line that is steady and getting worse     Pt states that pain has been going on for an hour     Pt states that the pain is making him sweaty and that he feels clammy     Pt is able to stand     Rates pain 8/10    Per Disposition:Go to ED Now    Care advice given per protocol and when to call back. Pt verbalized understanding and agrees to plan of care.    Caitlin Salvador RN  [facility] Nurse Advisor  1:06 PM 12/4/2022      COVID 19 Nurse Triage Plan/Patient Instructions    Please be aware that novel coronavirus (COVID-19) may be circulating in the community. If you develop symptoms such as fever, cough, or SOB or if you have concerns about the presence of another infection including coronavirus (COVID-19), please contact your health care provider or visit [email or web address].     Disposition/Instructions    ED Visit recommended. Follow protocol based instructions.     Bring Your Own Device:  Please also bring your smart device(s) (smart phones, tablets, laptops) and their charging cables for your personal use and to communicate with your care team during your visit.    Thank you for taking steps to prevent the spread of this virus.  o Limit your contact with others.  o Wear a simple mask to cover your cough.  o Wash your hands well and often.    Resources    [facility]: About COVID-19: [email or web address]    CDC: What to Do If You're Sick: www.cdc.gov/coronavirus/2019-ncov/about/steps-when-sick.html    CDC: Ending Home Isolation: www.cdc.gov/coronavirus/2019-ncov/hcp/disposition-in-home-patients.html     CDC: Caring for Someone: www.cdc.gov/coronavirus/2019-ncov/if-you-are-sick/care-for-someone.html     [facility]: Interim Guidance for Hospital Discharge to Home: www.health.[facility].mn.us/diseases/coronavirus/hcp/hospdischarge.pdf    [facility] clinical trials (COVID-19 research studies): clinicalaffairs.[facility]-clinical-trials     Below  "are the COVID-19 hotlines at the [place] Department of Health ([facility]). Interpreters are available.   o For health questions: Call 455-951-6356 or 1-358.804.1076 (7 a.m. to 7 p.m.)  o For questions about schools and childcare: Call 102-092-7023 or 1-933.947.4167 (7 a.m. to 7 p.m.)                      Reason for Disposition    [1] SEVERE pain (e.g., excruciating) AND [2] present > 1 hour    Additional Information    Negative: Shock suspected (e.g., cold/pale/clammy skin, too weak to stand, low BP, rapid pulse)    Negative: Difficult to awaken or acting confused (e.g., disoriented, slurred speech)    Negative: Passed out (i.e., lost consciousness, collapsed and was not responding)    Negative: Sounds like a life-threatening emergency to the triager    Negative: Chest pain    Negative: Pain is mainly in upper abdomen  (if needed ask: \"is it mainly above the belly button?\")    Negative: Followed an abdomen (stomach) injury    Protocols used: ABDOMINAL PAIN - MALE-A-AH      "

## 2022-12-05 NOTE — TELEPHONE ENCOUNTER
Trung calls and says that he spoke to a nurse earlier and thought that his appendix burst. Pt. Says that he went to the ER and had an X-Ray done. Pt. Says that he has kidney stones. Pt. Says that the DrLit Told him that the stones were high up so pt. Would probably not urinate the stones out. Pt. Says that he urinated and there was a 2-3 mm black spot in his urine. Pt. Says that he wants to know if that spot was a kidney stone. RN then answered pt's question and pt. Voiced understanding. COVID 19 Nurse Triage Plan/Patient Instructions    Please be aware that novel coronavirus (COVID-19) may be circulating in the community. If you develop symptoms such as fever, cough, or SOB or if you have concerns about the presence of another infection including coronavirus (COVID-19), please contact your health care provider or visit [email or web address].     Disposition/Instructions    Virtual Visit with provider recommended. Reference Visit Selection Guide.    Thank you for taking steps to prevent the spread of this virus.  o Limit your contact with others.  o Wear a simple mask to cover your cough.  o Wash your hands well and often.    Resources    [facility]: About COVID-19: [email or web address]    CDC: What to Do If You're Sick: www.cdc.gov/coronavirus/2019-ncov/about/steps-when-sick.html    CDC: Ending Home Isolation: www.cdc.gov/coronavirus/2019-ncov/hcp/disposition-in-home-patients.html     CDC: Caring for Someone: www.cdc.gov/coronavirus/2019-ncov/if-you-are-sick/care-for-someone.html     [facility]: Interim Guidance for Hospital Discharge to Home: www.health.Counts include 234 beds at the [facility].mn.us/diseases/coronavirus/hcp/hospdischarge.pdf    [facility] clinical trials (COVID-19 research studies): clinicalaffairs.[facility]/umn-clinical-trials     Below are the COVID-19 hotlines at the [place] Department of Health ([facility]). Interpreters are available.   o For health questions: Call 481-380-7711 or 1-343.461.8532 (4  a.m. to 7 p.m.)  o For questions about schools and childcare: Call 160-805-9765 or 1-314.938.5022 (7 a.m. to 7 p.m.)                   Reason for Disposition    [1] Caller requesting NON-URGENT health information AND [2] PCP's office is the best resource    Additional Information    Negative: [1] Caller is not with the adult (patient) AND [2] reporting urgent symptoms    Negative: Lab result questions    Negative: Medication questions    Negative: Caller can't be reached by phone    Negative: Caller has already spoken to PCP or another triager    Negative: RN needs further essential information from caller in order to complete triage    Negative: Requesting regular office appointment    Protocols used: INFORMATION ONLY CALL - NO TRIAGE-A-

## 2022-12-13 ENCOUNTER — NURSE TRIAGE (OUTPATIENT)
Dept: NURSING | Facility: CLINIC | Age: 57
End: 2022-12-13

## 2022-12-14 ENCOUNTER — NURSE TRIAGE (OUTPATIENT)
Dept: NURSING | Facility: CLINIC | Age: 57
End: 2022-12-14

## 2022-12-14 NOTE — TELEPHONE ENCOUNTER
"Patient has a left foot baby toe.  Patient hit toe on steel door jam.  Patient iced toe last night and elevated foot.  Patient is going to be on his toe all day and is wearing loose boot.  Patient phoned yesterday and did not go into urgent care.  Patient is calling back today and is requesting to be seen in clinic.  Patient does not wish to go to urgent care or hospital.  Patient denies major bleeding and denies amputated toe.  Denies skin is split open or gaping.  Denies bleeding or dirt in wound.  Toe pain today is a \"1\".  When shoveling it was a \"3\".   Patient states that his toe is crooked and now can feel like a \"click\".  Clinic please phone patient.    Nurse Triage SBAR    Is this a 2nd Level Triage? YES, LICENSED PRACTITIONER REVIEW IS REQUIRED    Situation:   Patient hit left foot baby toe on a steel door jam yesterday while chasing his dog.     Background:   Patient triaged yesterday and patient did not go in to urgent care or ER due to cost.    Assessment:   Today patient is requesting to be seen in clinic.  Denies bleeding.  Denies severe pain.  Patient is able to walk and is going to work today as he will wear loose boots.    Protocol Recommended Disposition:   See HCP Within 4 Hours (Or PCP Triage)    Recommendation:   Clinic please phone patient regarding being seen in clinic today.       Routed to provider    Does the patient meet one of the following criteria for ADS visit consideration? 16+ years old, with an [facility] PCP     TIP  Providers, please consider if this condition is appropriate for management at one of our Acute and Diagnostic Services sites.     If patient is a good candidate, please use dotphrase <dot>triageresponse and select Refer to ADS to document.      Reason for Disposition    Looks like a broken bone (e.g., crooked or deformed)    Additional Information    Negative: [1] Major bleeding (e.g., spurting blood) AND [2] can't be stopped    Negative: Amputated toe    Negative: Skin is " split open or gaping (or length > 1/2 inch or 12 mm)    Negative: [1] Bleeding AND [2] won't stop after 10 minutes of direct pressure (using correct technique)    Negative: [1] Dirt in the wound AND [2] not removed with 15 minutes of scrubbing    Negative: Sounds like a serious injury to the triager    Negative: [1] SEVERE pain AND [2] not improved 2 hours after pain medicine/ice packs    Negative: [1] MODERATE-SEVERE pain AND [2] blood present under the toenail    Protocols used: TOE INJURY-A-AH

## 2022-12-14 NOTE — TELEPHONE ENCOUNTER
Please schedule appointment for examination with PCP.    Campbell Sewell MD, FAAFP  Family Medicine Physician  [facility] [place]  [address] [place] [address]

## 2022-12-14 NOTE — TELEPHONE ENCOUNTER
Please advise patient that primary care provider is out of the office this week, please schedule follow-up appointment or recommend urgent care.    Campbell Sewell MD, FAAFP  Family Medicine Physician  [facility] [place]  [address] [address] [place] [address]

## 2022-12-14 NOTE — LETTER
December 19, 2022      Trung Dubois  [address] [address] [place] [id number]              Hi Bob     Our doctors here feel you should be seen in clinic or urgent care but unfortunately we do not have any appointments to fit you in to until next week.      If you are still having concerns today we would suggest an urgent care or other clinic visit before the weekend.      Your [facility] Team

## 2022-12-14 NOTE — TELEPHONE ENCOUNTER
Pt calling with concerns about;    States he hit his little toe on steel door jam while chasing his dog tonight.  #5 LE looks crooked, deformed  Pt states he is icing toe now  Took ibuprofen  Will tape toe to next toe    Pt reports he 'will not go to UC or ED tonight but will contact his PCP in the morning'    Pt Denies;  Severe pain  Bleeding or open skin    According to the protocol, patient should See a physician or HCP within 4 hours or PCP triage.  Care advice given. Patient verbalizes understanding and states 'he'll get a hold of his Dr in the morning'    Msg routed to PCP/Care Team    Iraida Diane RN, Nurse Advisor 7:00 PM 12/13/2022  Reason for Disposition    Looks like a dislocated joint (e.g., crooked or deformed)    Additional Information    Negative: [1] Major bleeding (e.g., spurting blood) AND [2] can't be stopped    Negative: Foot or ankle injury    Negative: Looks infected    Negative: Amputated toe    Negative: Skin is split open or gaping (or length > 1/2 inch or 12 mm)    Negative: [1] Bleeding AND [2] won't stop after 10 minutes of direct pressure (using correct technique)    Negative: [1] Dirt in the wound AND [2] not removed with 15 minutes of scrubbing    Negative: Sounds like a serious injury to the triager    Negative: [1] SEVERE pain AND [2] not improved 2 hours after pain medicine/ice packs    Negative: [1] MODERATE-SEVERE pain AND [2] blood present under the toenail    Negative: Looks like a broken bone (e.g., crooked or deformed)    Protocols used: TOE INJURY-A-

## 2022-12-15 NOTE — TELEPHONE ENCOUNTER
"Triage Call:    Patient calling back regarding toe injury.  He reports he was able to \"click toe back into place last night\".  He had toe desire taped all day today.  He reports this evening there is a large amount of swelling to toe and redness.  He denies change in sensation.  He reports mild pain with pressure with a few spots on the tops of toe having moderate pain with pressure.  He reports mild decrease in movement.  Patient denies crooked now, toenail affected, or open areas.      According to the protocol, patient should see physician within 3 days.  Care advice given to ice for 20 minutes every 4 hours for first 48 hours and then heat for 10 minutes 3 times a day, elevate, and berta redness.  Advised when to call back. Patient verbalizes understanding and agrees with plan of care. Patient reports he is currently on the cancellation list for an appointment.    Melanie Johnson RN  12/14/22 6:34 PM  [facility] Nurse Advisor    Reason for Disposition    Large swelling or bruise    Additional Information    Negative: [1] Major bleeding (e.g., spurting blood) AND [2] can't be stopped    Negative: Foot or ankle injury    Negative: Looks infected    Negative: Amputated toe    Negative: Skin is split open or gaping (or length > 1/2 inch or 12 mm)    Negative: [1] Bleeding AND [2] won't stop after 10 minutes of direct pressure (using correct technique)    Negative: [1] Dirt in the wound AND [2] not removed with 15 minutes of scrubbing    Negative: Sounds like a serious injury to the triager    Negative: [1] SEVERE pain AND [2] not improved 2 hours after pain medicine/ice packs    Negative: [1] MODERATE-SEVERE pain AND [2] blood present under the toenail    Negative: Looks like a broken bone (e.g., crooked or deformed)    Negative: Looks like a dislocated joint (e.g., crooked or deformed)    Negative: Toenail is completely torn off (toenail avulsion)    Negative: Base of toenail has popped out of the skin fold " (nail base dislocation)    Negative: [1] Toe injury AND [2] bad limp or can't wear shoes/sandals    Negative: [1] No prior tetanus shots (or is not fully vaccinated) AND [2] any wound (e.g., cut, scrape)    Negative: [1] HIV positive or severe immunodeficiency (severely weak immune system) AND [2] DIRTY cut or scrape    Negative: [1] Last tetanus shot > 5 years ago AND [2] DIRTY cut or scrape    Negative: [1] Last tetanus shot > 10 years ago AND [2] CLEAN cut or scrape (e.g., object AND skin were clean)    Negative: [1] Diabetes mellitus AND [2] small cut (scratch) or abrasion (scrape)    Protocols used: TOE INJURY-A-AH

## 2022-12-27 ENCOUNTER — TELEPHONE (OUTPATIENT)
Dept: FAMILY MEDICINE | Facility: CLINIC | Age: 57
End: 2022-12-27

## 2022-12-27 NOTE — TELEPHONE ENCOUNTER
Reason for Call:  Other appointment    Detailed comments: pt is wondering if pcp or Dr Delaney can see pt sooner than 1/12 appointment. No current openings. Please call pt back to discuss.    Phone Number Patient can be reached at: Cell number on file:    Telephone Information:   Mobile [phone]       Best Time: na    Can we leave a detailed message on this number? Not Applicable    Call taken on 12/27/2022 at 10:51 AM by Sonia Holm

## 2022-12-27 NOTE — TELEPHONE ENCOUNTER
Routing to PCP to see if patient can be seen earlier.     Reinaldo Bronson MA on 12/27/2022 at 12:37 PM

## 2022-12-28 NOTE — TELEPHONE ENCOUNTER
Staff called and spoke with patient.   Scheduled patient for 12/30 with Dr Delaney. Patient needed to double check to make sure he could make that appointment work. He will call back and let staff know which appointment he will keep, the other appointment will need to be cancelled at that time.

## 2022-12-30 ENCOUNTER — OFFICE VISIT (OUTPATIENT)
Dept: FAMILY MEDICINE | Facility: CLINIC | Age: 57
End: 2022-12-30
Payer: OTHER MISCELLANEOUS

## 2022-12-30 VITALS
HEIGHT: 73 IN | BODY MASS INDEX: 34.79 KG/M2 | SYSTOLIC BLOOD PRESSURE: 118 MMHG | HEART RATE: 85 BPM | DIASTOLIC BLOOD PRESSURE: 77 MMHG | WEIGHT: 262.5 LBS | TEMPERATURE: 99 F | OXYGEN SATURATION: 95 %

## 2022-12-30 DIAGNOSIS — M25.562 LEFT KNEE PAIN, UNSPECIFIED CHRONICITY: Primary | ICD-10-CM

## 2022-12-30 DIAGNOSIS — S99.922A TOE INJURY, LEFT, INITIAL ENCOUNTER: ICD-10-CM

## 2022-12-30 PROCEDURE — 99214 OFFICE O/P EST MOD 30 MIN: CPT | Performed by: FAMILY MEDICINE

## 2022-12-30 ASSESSMENT — PAIN SCALES - GENERAL: PAINLEVEL: MODERATE PAIN (4)

## 2022-12-30 ASSESSMENT — PATIENT HEALTH QUESTIONNAIRE - PHQ9
SUM OF ALL RESPONSES TO PHQ QUESTIONS 1-9: 0
10. IF YOU CHECKED OFF ANY PROBLEMS, HOW DIFFICULT HAVE THESE PROBLEMS MADE IT FOR YOU TO DO YOUR WORK, TAKE CARE OF THINGS AT HOME, OR GET ALONG WITH OTHER PEOPLE: NOT DIFFICULT AT ALL
SUM OF ALL RESPONSES TO PHQ QUESTIONS 1-9: 0

## 2022-12-30 NOTE — PROGRESS NOTES
Assessment & Plan   1. Left knee pain, unspecified chronicity: Patient wishing to pursue surgical options to reduce pain prior to his busy season.  Recommend referral back to orthopedics for further evaluation.  Referral given.  - Orthopedic  Referral; Future    2. Toe injury, left, initial encounter: Unfortunately do not have x-ray available today.  He has good capillary refill and light touch sensation.  Recommended desire taping it, elevation, NSAID use.  He is not wanting to drive to a separate location or receive an x-ray.  Discussed that at this point x-rays are unlikely to change his management 2 weeks or more out from the injury.  If he continues to have worsening pain, swelling or pain persist etc. would reevaluate or send to orthopedics following x-ray.      Murphy Hernandez MD  [facility]    This chart is completed utilizing dictation software; typos and/or incorrect word substitutions may unintentionally occur.       Terry Rojas is a 57 year old accompanied by his self, presenting for the following health issues:  Knee Pain (Wants clearance for surgery) and Toe Pain (Possible broken toe)    Knee Pain    History of Present Illness       Reason for visit:  Knee pain,  toe pain  Symptom onset:  3-4 weeks ago  Symptom intensity:  Moderate  Symptom progression:  Worsening  Had these symptoms before:  Yes  Has tried/received treatment for these symptoms:  Yes  Previous treatment was successful:  Yes  Prior treatment description:  Cortisone injection, worked for a few months    He eats 2-3 servings of fruits and vegetables daily.He consumes 2 sweetened beverage(s) daily.He exercises with enough effort to increase his heart rate 9 or less minutes per day.  He exercises with enough effort to increase his heart rate 3 or less days per week.   He is taking medications regularly.    Today's PHQ-9         PHQ-9 Total Score: 0    PHQ-9 Q9 Thoughts of better off  "dead/self-harm past 2 weeks :   Not at all    How difficult have these problems made it for you to do your work, take care of things at home, or get along with other people: Not difficult at all     Patient is here for follow-up in regards to his left knee pain.  Believes he had a injection at this clinic previously in June.  Per chart review it looks like his PCP gave him a referral to orthopedic surgery.  An x-ray was done followed by an MRI.  Appears he does have a left lateral meniscal tear of the posterior horn and patellofemoral chondromalacia.     Considering doing arthroscopic surgery but trialed a injection first.  Injection gave him 1 month relief, then the pain returned..  He is wondering what he has to do to do surgery.  He is currently in his off season for his work and would like to get this done before the summer.    Patiently he stubbed his left little toe approximate 2 weeks ago.  He has been desire taping it.  He is wondering if is anything else he should do.    PHQ-9 SCORE 2/2/2021 2/8/2022 12/30/2022   PHQ-9 Total Score - - -   PHQ-9 Total Score MyChart 4 (Minimal depression) - 0   PHQ-9 Total Score 4 5 0       ENID-7 SCORE 1/6/2020 2/2/2021 2/8/2022   Total Score - - -   Total Score 6 (mild anxiety) 1 (minimal anxiety) -   Total Score 6 1 7       PHQ-9 SCORE 2/2/2021 2/8/2022 12/30/2022   PHQ-9 Total Score - - -   PHQ-9 Total Score MyChart 4 (Minimal depression) - 0   PHQ-9 Total Score 4 5 0     PEG Score 12/30/2022   PEG Total Score 3.33       Review of Systems   Constitutional, MSK, Derm, cardiovascular, pulmonary, gi and gu systems are negative, except as otherwise noted.      Objective    /77 (BP Location: Left arm, Patient Position: Sitting, Cuff Size: Adult Large)   Pulse 85   Temp 99  F (37.2  C) (Temporal)   Ht 1.845 m (6' 0.64\")   Wt 119.1 kg (262 lb 8 oz)   SpO2 95%   BMI 34.98 kg/m    Body mass index is 34.98 kg/m .  Physical Exam   General: Appears well and in no acute " distress.  Cardiovascular: Regular rate and rhythm, normal S1 and S2 without murmur. No extra heartsounds or friction rub. Radial pulses present and equal bilaterally.  Respiratory: Lungs clear to auscultation bilaterally. No wheezing or crackles. No prolonged expiration. Symmetrical chest rise.  Musculoskeletal: Edematous left toe.  Good capillary refill and light touch sensation.  Otherwise no gross extremity deformities. No peripheral edema. Normal muscle bulk.             Labs: None

## 2023-01-19 ENCOUNTER — OFFICE VISIT (OUTPATIENT)
Dept: ORTHOPEDICS | Facility: CLINIC | Age: 58
End: 2023-01-19
Payer: OTHER MISCELLANEOUS

## 2023-01-19 ENCOUNTER — TELEPHONE (OUTPATIENT)
Dept: ORTHOPEDICS | Facility: CLINIC | Age: 58
End: 2023-01-19

## 2023-01-19 VITALS — HEIGHT: 73 IN | BODY MASS INDEX: 35.12 KG/M2 | WEIGHT: 265 LBS

## 2023-01-19 DIAGNOSIS — M25.562 LEFT KNEE PAIN, UNSPECIFIED CHRONICITY: Primary | ICD-10-CM

## 2023-01-19 DIAGNOSIS — M25.562 LEFT KNEE PAIN, UNSPECIFIED CHRONICITY: ICD-10-CM

## 2023-01-19 PROCEDURE — 99214 OFFICE O/P EST MOD 30 MIN: CPT | Performed by: ORTHOPAEDIC SURGERY

## 2023-01-19 NOTE — TELEPHONE ENCOUNTER
Procedure: meniscectomy  Facility:  [facility]  Length: 60 minutes  Anesthesia: Choice  Post-op appointments needed: 2 weeks provider only, 6 weeks with provider only.  Surgery packet/instructions given to patient?  Yes     Pre-Operative Teaching Flowsheet     Person(s) involved in teaching: Patient     Motivation Level:  Receptive (willing/able to accept information) and asks appropriate questions where applicable: Yes  Any cultural factors/Voodoo beliefs that may influence understanding or compliance? No     Patient demonstrates understanding of the following:  Pre-operative planning, including the necessary appointments and preparation needed prior to surgery: Yes  Which situations necessitate calling provider and whom to contact: Yes  Pain management techniques pre and post op: Yes  How, and when, to access community resources: Yes    Who will stay/ with patient after surgery: Wife  Who will drive patient to surgery: Wife  PCP within [facility]  PT within .         Additional Teaching Concerns Addressed:   Post-operative living arrangements and necessary adaptations to living environment.     Instructional Materials Used/Given: Yes, pre-op packet given including forms for Your surgery day, medications to stop taking before surgery, preparing for surgery, Covid-19 testing, showering before surgery, Stop light tool introduced, Opioid pain medication guideline, pre-op physical form, and map  Patient expressed understanding of all forms given, questions were answered and will review in more detail at home.     Time spent with patient: 20 minutes.

## 2023-01-19 NOTE — PROGRESS NOTES
Trung Dubois is a very pleasant 57-year-old male who returns to my clinic today for interval follow-up.  I performed a corticosteroid injection on him in June of this year.  This actually was quite helpful to the patient however his pain persists has returned after the injection.  He states that he did not like this to get taken care of.  He does not want to keep getting injections.  He feels sufficiently limited by his knee pain and it bothers him on a daily basis.  It keeps him from doing the things that he wants to do.    Examination today of his left knee shows range of motion 0-1 35.  Stable to varus valgus stress testing.  Stable anterior posterior drawer testing.  No pivot shift.  Lachman 0.    Crepitation is present.    Imaging: Plain radiographs from June 9 show overall well-preserved joint space the medial compartment lateral compartment and patellofemoral compartment.    MRI was reviewed by myself which does show some trace chondrosis of the patellofemoral compartment and the patella as well as the trochlea though I do not think there is full-thickness cartilage loss.  ACL PCL are intact.  Complex tear the posterior horn lateral meniscus is present.  Some lateral compartment chondrosis is present.    Clinical assessment: Symptomatic lateral meniscus tear, failure of conservative management to provide lasting benefit     Patellofemoral and lateral compartment chondrosis    Plan: Long discussion with the patient.  Reviewed the diagnosis potential treatment options.  I discussed with her the pros cons risk benefits of surgery.  We discussed expected course of recovery operative treatment options.  I offered him arthroscopic meniscectomy, chondroplasty.  We discussed the limitations.  Look for time to schedule to get this completed.

## 2023-01-19 NOTE — LETTER
1/19/2023         RE: Trung Dubois  [id number] [facility] [address]        Dear Colleague,    Thank you for referring your patient, Trung Dubois, to the [facility]. Please see a copy of my visit note below.    Trung Dubois is a very pleasant 57-year-old male who returns to my clinic today for interval follow-up.  I performed a corticosteroid injection on him in June of this year.  This actually was quite helpful to the patient however his pain persists has returned after the injection.  He states that he did not like this to get taken care of.  He does not want to keep getting injections.  He feels sufficiently limited by his knee pain and it bothers him on a daily basis.  It keeps him from doing the things that he wants to do.    Examination today of his left knee shows range of motion 0-1 35.  Stable to varus valgus stress testing.  Stable anterior posterior drawer testing.  No pivot shift.  Lachman 0.    Crepitation is present.    Imaging: Plain radiographs from June 9 show overall well-preserved joint space the medial compartment lateral compartment and patellofemoral compartment.    MRI was reviewed by myself which does show some trace chondrosis of the patellofemoral compartment and the patella as well as the trochlea though I do not think there is full-thickness cartilage loss.  ACL PCL are intact.  Complex tear the posterior horn lateral meniscus is present.  Some lateral compartment chondrosis is present.    Clinical assessment: Symptomatic lateral meniscus tear, failure of conservative management to provide lasting benefit     Patellofemoral and lateral compartment chondrosis    Plan: Long discussion with the patient.  Reviewed the diagnosis potential treatment options.  I discussed with her the pros cons risk benefits of surgery.  We discussed expected course of recovery operative treatment options.  I offered him arthroscopic meniscectomy,  chondroplasty.  We discussed the limitations.  Look for time to schedule to get this completed.      Again, thank you for allowing me to participate in the care of your patient.        Sincerely,        Daniel Rios MD

## 2023-01-19 NOTE — NURSING NOTE
Reason For Visit:   Chief Complaint   Patient presents with     Follow Up     Left knee pain, cortisone injection 6/23/22       ?  No  Occupation truck drive.  Currently working? Yes.  Work status?  Full time.  Date of injury: years ago knee spur  Type of injury: fall.  Date of surgery: NA  Type of surgery: NA.  Smoker: No  Request smoking cessation information: No    Sane Score  Left knee - Affected  Left Knee- 45  Right Knee- 80    Keli Qureshi, EMT

## 2023-01-20 NOTE — TELEPHONE ENCOUNTER
Patient Returning Call    Reason for call:  Patient calling to set knee surgery with Dr Rios    Information relayed to patient:  TE sent to clinic    Patient has additional questions:  Yes    What are your questions/concerns:  Requesting callback     [facility] or would you prefer to receive a phone call?:   Patient would prefer a phone call   Okay to leave a detailed message?: Yes at Cell number on file:    Telephone Information:   Mobile [phone]

## 2023-01-23 PROBLEM — M25.562 LEFT KNEE PAIN, UNSPECIFIED CHRONICITY: Status: ACTIVE | Noted: 2023-01-23

## 2023-01-23 NOTE — TELEPHONE ENCOUNTER
Patient is scheduled for surgery with Dr. Rios    Spoke with: Patient    Date of Surgery: 2/9/23    Location: [facility]    Post op: 1 & 6 weeks    Pre op with Provider: Complete    H&P: Scheduled with PCP 1/31/23    Pre-procedure COVID-19 Test: N/A    Additional imaging/appointments: N/A    Surgery packet: Received in clinic     Additional comments: Work Comp

## 2023-01-27 ENCOUNTER — TELEPHONE (OUTPATIENT)
Dept: FAMILY MEDICINE | Facility: CLINIC | Age: 58
End: 2023-01-27
Payer: COMMERCIAL

## 2023-01-27 NOTE — TELEPHONE ENCOUNTER
[facility] Call Center    Phone Message    May a detailed message be left on voicemail: yes     Reason for Call: Other: pt calling to see if he can reschedule surgery due to w/c not approving the surgery - due to medical records not being received on time and primary insurance will not pay for surgery until work comp is in full denial.  Pt asking for call back at # [phone]     Action Taken: Message routed to:  [facility] NANCYP:  Dr. Rios    Travel Screening: Not Applicable

## 2023-01-27 NOTE — TELEPHONE ENCOUNTER
FYI:    Patient just called and scheduled another time for a pre-op appt (1/30). He was told by nurse it was okay to take provider's same day appt at 8:40am. He requested to keep the other appt until the end of today 1/27 and he will call to cancel one of the appts. He stated his work comp is giving him the run around so he needed a sooner appt.

## 2023-01-30 ENCOUNTER — OFFICE VISIT (OUTPATIENT)
Dept: FAMILY MEDICINE | Facility: CLINIC | Age: 58
End: 2023-01-30
Payer: COMMERCIAL

## 2023-01-30 ENCOUNTER — TELEPHONE (OUTPATIENT)
Dept: FAMILY MEDICINE | Facility: CLINIC | Age: 58
End: 2023-01-30

## 2023-01-30 VITALS
TEMPERATURE: 99.4 F | BODY MASS INDEX: 34.96 KG/M2 | SYSTOLIC BLOOD PRESSURE: 130 MMHG | HEART RATE: 60 BPM | OXYGEN SATURATION: 98 % | HEIGHT: 73 IN | DIASTOLIC BLOOD PRESSURE: 80 MMHG | WEIGHT: 263.8 LBS

## 2023-01-30 DIAGNOSIS — R73.03 PREDIABETES: ICD-10-CM

## 2023-01-30 DIAGNOSIS — G47.33 OSA (OBSTRUCTIVE SLEEP APNEA): ICD-10-CM

## 2023-01-30 DIAGNOSIS — E66.811 CLASS 1 OBESITY DUE TO EXCESS CALORIES WITHOUT SERIOUS COMORBIDITY IN ADULT, UNSPECIFIED BMI: ICD-10-CM

## 2023-01-30 DIAGNOSIS — Z00.00 ROUTINE MEDICAL EXAM: Primary | ICD-10-CM

## 2023-01-30 DIAGNOSIS — F33.42 RECURRENT MAJOR DEPRESSIVE DISORDER, IN FULL REMISSION (H): ICD-10-CM

## 2023-01-30 DIAGNOSIS — E66.09 CLASS 1 OBESITY DUE TO EXCESS CALORIES WITHOUT SERIOUS COMORBIDITY IN ADULT, UNSPECIFIED BMI: ICD-10-CM

## 2023-01-30 DIAGNOSIS — K21.9 GASTROESOPHAGEAL REFLUX DISEASE WITHOUT ESOPHAGITIS: ICD-10-CM

## 2023-01-30 DIAGNOSIS — E78.5 HYPERLIPIDEMIA LDL GOAL <130: Primary | ICD-10-CM

## 2023-01-30 LAB
ANION GAP SERPL CALCULATED.3IONS-SCNC: 5 MMOL/L (ref 3–14)
BASOPHILS # BLD AUTO: 0 10E3/UL (ref 0–0.2)
BASOPHILS NFR BLD AUTO: 0 %
BUN SERPL-MCNC: 20 MG/DL (ref 7–30)
CALCIUM SERPL-MCNC: 9.4 MG/DL (ref 8.5–10.1)
CHLORIDE BLD-SCNC: 105 MMOL/L (ref 94–109)
CHOLEST SERPL-MCNC: 226 MG/DL
CO2 SERPL-SCNC: 28 MMOL/L (ref 20–32)
CREAT SERPL-MCNC: 1.03 MG/DL (ref 0.66–1.25)
EOSINOPHIL # BLD AUTO: 0.2 10E3/UL (ref 0–0.7)
EOSINOPHIL NFR BLD AUTO: 2 %
ERYTHROCYTE [DISTWIDTH] IN BLOOD BY AUTOMATED COUNT: 13.6 % (ref 10–15)
FASTING STATUS PATIENT QL REPORTED: YES
GFR SERPL CREATININE-BSD FRML MDRD: 85 ML/MIN/1.73M2
GLUCOSE BLD-MCNC: 123 MG/DL (ref 70–99)
HBA1C MFR BLD: 6.3 % (ref 0–5.6)
HCT VFR BLD AUTO: 47.3 % (ref 40–53)
HDLC SERPL-MCNC: 55 MG/DL
HGB BLD-MCNC: 15.7 G/DL (ref 13.3–17.7)
IMM GRANULOCYTES # BLD: 0 10E3/UL
IMM GRANULOCYTES NFR BLD: 0 %
LDLC SERPL CALC-MCNC: 146 MG/DL
LYMPHOCYTES # BLD AUTO: 3 10E3/UL (ref 0.8–5.3)
LYMPHOCYTES NFR BLD AUTO: 37 %
MCH RBC QN AUTO: 26.6 PG (ref 26.5–33)
MCHC RBC AUTO-ENTMCNC: 33.2 G/DL (ref 31.5–36.5)
MCV RBC AUTO: 80 FL (ref 78–100)
MONOCYTES # BLD AUTO: 0.6 10E3/UL (ref 0–1.3)
MONOCYTES NFR BLD AUTO: 8 %
NEUTROPHILS # BLD AUTO: 4.1 10E3/UL (ref 1.6–8.3)
NEUTROPHILS NFR BLD AUTO: 51 %
NONHDLC SERPL-MCNC: 171 MG/DL
PLATELET # BLD AUTO: 195 10E3/UL (ref 150–450)
POTASSIUM BLD-SCNC: 4.6 MMOL/L (ref 3.4–5.3)
PSA SERPL-MCNC: 1.78 UG/L (ref 0–4)
RBC # BLD AUTO: 5.9 10E6/UL (ref 4.4–5.9)
SODIUM SERPL-SCNC: 138 MMOL/L (ref 133–144)
TRIGL SERPL-MCNC: 126 MG/DL
TSH SERPL DL<=0.005 MIU/L-ACNC: 2 MU/L (ref 0.4–4)
WBC # BLD AUTO: 7.9 10E3/UL (ref 4–11)

## 2023-01-30 PROCEDURE — 36415 COLL VENOUS BLD VENIPUNCTURE: CPT | Performed by: NURSE PRACTITIONER

## 2023-01-30 PROCEDURE — G0103 PSA SCREENING: HCPCS | Performed by: NURSE PRACTITIONER

## 2023-01-30 PROCEDURE — 99396 PREV VISIT EST AGE 40-64: CPT | Performed by: NURSE PRACTITIONER

## 2023-01-30 PROCEDURE — 85025 COMPLETE CBC W/AUTO DIFF WBC: CPT | Performed by: NURSE PRACTITIONER

## 2023-01-30 PROCEDURE — 99213 OFFICE O/P EST LOW 20 MIN: CPT | Mod: 25 | Performed by: NURSE PRACTITIONER

## 2023-01-30 PROCEDURE — 83036 HEMOGLOBIN GLYCOSYLATED A1C: CPT | Performed by: NURSE PRACTITIONER

## 2023-01-30 PROCEDURE — 80061 LIPID PANEL: CPT | Performed by: NURSE PRACTITIONER

## 2023-01-30 PROCEDURE — 84443 ASSAY THYROID STIM HORMONE: CPT | Performed by: NURSE PRACTITIONER

## 2023-01-30 PROCEDURE — 80048 BASIC METABOLIC PNL TOTAL CA: CPT | Performed by: NURSE PRACTITIONER

## 2023-01-30 ASSESSMENT — ENCOUNTER SYMPTOMS
PALPITATIONS: 0
FREQUENCY: 0
NAUSEA: 0
ARTHRALGIAS: 1
HEMATURIA: 0
PARESTHESIAS: 0
HEMATOCHEZIA: 0
DIZZINESS: 0
WEAKNESS: 0
EYE PAIN: 0
JOINT SWELLING: 1
DYSURIA: 0
NERVOUS/ANXIOUS: 1
DIARRHEA: 0
CHILLS: 1
MYALGIAS: 0
ABDOMINAL PAIN: 0
FEVER: 0
SORE THROAT: 0
HEADACHES: 0
HEARTBURN: 0
COUGH: 0
CONSTIPATION: 0
SHORTNESS OF BREATH: 0

## 2023-01-30 ASSESSMENT — PAIN SCALES - GENERAL: PAINLEVEL: MILD PAIN (3)

## 2023-01-30 NOTE — PROGRESS NOTES
"SUBJECTIVE:   Bob is a 57 year old who presents for Preventive Visit.  {Split Bill scripting  The purpose of this visit is to discuss your medical history and prevent health problems before you are sick. You may be responsible for a co-pay, coinsurance, or deductible if your visit today includes services such as checking on a sore throat, having an x-ray or lab test, or treating and evaluating a new or existing condition :059480}  {Patient advised of split billing (Optional):083365}  Are you in the first 12 months of your Medicare coverage?  { :424652::\"No\"}    HPI    Have you ever done Advance Care Planning? (For example, a Health Directive, POLST, or a discussion with a medical provider or your loved ones about your wishes): { :323013}    {Hearing Test Done (Optional):718553}   Fall risk  { :028636}  {If any of the above assessments are answered yes, consider ordering appropriate referrals (Optional):444273::\"click delete button to remove this line now\"}  Cognitive Screening { :763896}    {Do you have sleep apnea, excessive snoring or daytime drowsiness? (Optional):955348}    Reviewed and updated as needed this visit by clinical staff                  Reviewed and updated as needed this visit by Provider                 Social History     Tobacco Use     Smoking status: Never     Smokeless tobacco: Never   Substance Use Topics     Alcohol use: Yes     Comment: 1-2 drinks per week      {Rooming Staff- Complete this question if Prescreen response is not shown below for today's visit. If you drink alcohol do you typically have >3 drinks per day or >7 drinks per week? (Optional):749305}    Alcohol Use 2/7/2022   Prescreen: >3 drinks/day or >7 drinks/week? -   Prescreen: >3 drinks/day or >7 drinks/week? {AUDIT responses all:TXT,96089}   {add AUDIT responses (Optional) (A score of 7 for adult men is an indication of hazardous drinking; a score of 8 or more is an indication of an alcohol use disorder.  A score of 7 or " "more for adult women is an indication of hazardous drinking or an alchohol use disorder):882213}    {Outside tests to abstract? :441525}    {additional problems to add (Optional):477703}    Current providers sharing in care for this patient include: {Rooming staff:  Please update Care Team from storyboard, refresh this note and then delete this statement}  Patient Care Team:  Sangeetha Salazar APRN CNP as PCP - General (Family Practice)  Sangeetha Salazar APRN CNP as Assigned PCP  Daniel Rios MD as Assigned Musculoskeletal Provider    The following health maintenance items are reviewed in Epic and correct as of today:  Health Maintenance   Topic Date Due     HEPATITIS B IMMUNIZATION (1 of 3 - 3-dose series) Never done     ZOSTER IMMUNIZATION (1 of 2) Never done     COVID-19 Vaccine (2 - Booster for Nitish series) 02/11/2022     INFLUENZA VACCINE (1) 09/01/2022     YEARLY PREVENTIVE VISIT  02/07/2023     ANNUAL REVIEW OF HM ORDERS  02/07/2023     PHQ-9  06/30/2023     DTAP/TDAP/TD IMMUNIZATION (4 - Td or Tdap) 07/18/2024     COLORECTAL CANCER SCREENING  02/23/2025     LIPID  02/07/2027     ADVANCE CARE PLANNING  02/08/2027     HEPATITIS C SCREENING  Completed     HIV SCREENING  Completed     DEPRESSION ACTION PLAN  Completed     Pneumococcal Vaccine: Pediatrics (0 to 5 Years) and At-Risk Patients (6 to 64 Years)  Aged Out     IPV IMMUNIZATION  Aged Out     MENINGITIS IMMUNIZATION  Aged Out     {Chronicprobdata (optional):794135}  {Decision Support (Optional):181492}        Review of Systems  {ROS COMP (Optional):158700}    OBJECTIVE:   There were no vitals taken for this visit. Estimated body mass index is 34.96 kg/m  as calculated from the following:    Height as of 1/19/23: 1.854 m (6' 1\").    Weight as of 1/19/23: 120.2 kg (265 lb).  Physical Exam  {Exam (Optional) :859683}    {Diagnostic Test Results (Optional):205028::\"Diagnostic Test Results:\",\"Labs reviewed in Epic\"}    ASSESSMENT / PLAN:   {Diag " "Picklist:725564}    {Patient advised of split billing (Optional):548955}      COUNSELING:  {Medicare Counselin}      BMI:   Estimated body mass index is 34.96 kg/m  as calculated from the following:    Height as of 23: 1.854 m (6' 1\").    Weight as of 23: 120.2 kg (265 lb).   {Weight Management Plan needed for ACO:545806}      He reports that he has never smoked. He has never used smokeless tobacco.      Appropriate preventive services were discussed with this patient, including applicable screening as appropriate for cardiovascular disease, diabetes, osteopenia/osteoporosis, and glaucoma.  As appropriate for age/gender, discussed screening for colorectal cancer, prostate cancer, breast cancer, and cervical cancer. Checklist reviewing preventive services available has been given to the patient.    Reviewed patients plan of care and provided an AVS. The {CarePlan:389922} for Trung meets the Care Plan requirement. This Care Plan has been established and reviewed with the {PATIENT, FAMILY MEMBER, CAREGIVER:927507}.    {Counseling Resources  [place] Preventive Services Task Force  Cholesterol Screening  Health diet/nutrition  Pooled Cohorts Equation Calculator  USDA's MyPlate  ASA Prophylaxis  Lung CA Screening  Osteoporosis prevention/bone health :360901}  {Prostate Cancer Screening  Consider for men 55-69 per guidance from USPSTF :736636}    [facility]    Identified Health Risks:  "

## 2023-01-30 NOTE — TELEPHONE ENCOUNTER
The patient is requesting a call with other available dates for surgery with Dr. Rios at . His  insurance has not yet reviewed/approved his claim therefore the request for surgery approval can not be reviewed.

## 2023-01-30 NOTE — RESULT ENCOUNTER NOTE
Below is a tool that puts your heart/vascular risk factors together. It shows a possible risk of a heart attack or stroke event over a 10 year timeframe. The goal is under 7%. See your level below. At this point I recommend starting a cholesterol agent.     The 10-year ASCVD risk score (Aden WILDE, et al., 2019) is: 6.6%    Values used to calculate the score:      Age: 57 years      Sex: Male      Is Non- : No      Diabetic: No      Tobacco smoker: No      Systolic Blood Pressure: 130 mmHg      Is BP treated: No      HDL Cholesterol: 55 mg/dL      Total Cholesterol: 226 mg/dL     Normal thyroid, kidney and prostate testing.     Please call me if you are OK with this. I recommend Lipitor 20 mg and labs 1 month following the start.   Sincerely,   Sangeetha Salazar, DNP

## 2023-01-30 NOTE — TELEPHONE ENCOUNTER
Returned call to patient regarding surgery. Patient requesting to remain on the schedule for now, will call back and update prior to 2/9/23 if he needs to cancel his surgery. Patient will tentatively plan to reschedule for 3/23/23 at [place] in the event Work Comp does not approve in time for 2/9/23. Patient has the direct number for surgery scheduling and will call back with updates.

## 2023-01-30 NOTE — PROGRESS NOTES
SUBJECTIVE:   CC: Bob is an 57 year old who presents for preventative health visit.     Healthy Habits:     Getting at least 3 servings of Calcium per day:  NO    Bi-annual eye exam:  Yes    Dental care twice a year:  NO    Sleep apnea or symptoms of sleep apnea:  None    Diet:  Regular (no restrictions)    Frequency of exercise:  2-3 days/week    Duration of exercise:  15-30 minutes    Taking medications regularly:  Yes    Medication side effects:  Not applicable    PHQ-2 Total Score: 0    Additional concerns today:  Yes          Pain History:   When did you first notice your pain? - Chronic Pain   Have you seen this provider for your pain in the past?   No   Where in your body do your have pain? Left and toe and knee  Are you seeing anyone else for your pain? Yes - Unknown   What makes your pain better? Ace bandage   What makes your pain worse? Bending and twisting   How has pain affected your ability to work? 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   Who lives in your household? Pt and wife     Work comp issues with left knee pain- awaiting approval for surgery. Falls X2- on camera at work, but WC is thus far denying coverage.   This has been frustrating for pt.       Hit door jam at home while dealing with his dog 1 month.   Left 5th toe injury, did not have x-rayed. Was budding taping, is still swollen, but pain is improved. Tender after long days. Does not want it evaluated today.       Using ibuprofen for knee pain-   High BP, but at home is controlled.   Does not want treatment today.   intermittently high in the past- pt. Advised of HTN implications, declines treatment.     PHQ-9 SCORE 2/2/2021 2/8/2022 12/30/2022   PHQ-9 Total Score - - -   PHQ-9 Total Score MyChart 4 (Minimal depression) - 0   PHQ-9 Total Score 4 5 0       ENID-7 SCORE 1/6/2020 2/2/2021 2/8/2022   Total Score - - -   Total Score 6 (mild anxiety) 1 (minimal anxiety) -   Total  Score 6 1 7             Today's PHQ-2 Score:   PHQ-2 ( 1999 [employer]) 1/30/2023   Q1: Little interest or pleasure in doing things 0   Q2: Feeling down, depressed or hopeless 0   PHQ-2 Score 0   PHQ-2 Total Score (12-17 Years)- Positive if 3 or more points; Administer PHQ-A if positive -   Q1: Little interest or pleasure in doing things Not at all   Q2: Feeling down, depressed or hopeless Not at all   PHQ-2 Score 0       Social History     Tobacco Use     Smoking status: Never     Smokeless tobacco: Never   Substance Use Topics     Alcohol use: Yes     Comment: 1-2 drinks per week      If you drink alcohol do you typically have >3 drinks per day or >7 drinks per week? No    Alcohol Use 1/30/2023   Prescreen: >3 drinks/day or >7 drinks/week? No   Prescreen: >3 drinks/day or >7 drinks/week? -     Last PSA:   PSA   Date Value Ref Range Status   09/16/2020 1.69 0 - 4 ug/L Final     Comment:     Assay Method:  Chemiluminescence using Siemens Vista analyzer     Prostate Specific Antigen Screen   Date Value Ref Range Status   02/07/2022 2.00 0.00 - 4.00 ug/L Final       Reviewed orders with patient. Reviewed health maintenance and updated orders accordingly - Yes  Lab work is in process    Reviewed and updated as needed this visit by clinical staff   Tobacco  Allergies               Reviewed and updated as needed this visit by Provider                     Review of Systems   Constitutional: Positive for chills. Negative for fever.   HENT: Negative for congestion, ear pain, hearing loss and sore throat.    Eyes: Negative for pain and visual disturbance.   Respiratory: Negative for cough and shortness of breath.    Cardiovascular: Negative for chest pain, palpitations and peripheral edema.   Gastrointestinal: Negative for abdominal pain, constipation, diarrhea, heartburn, hematochezia and nausea.   Genitourinary: Negative for dysuria, frequency, genital sores, hematuria, impotence, penile discharge and urgency.  "  Musculoskeletal: Positive for arthralgias and joint swelling. Negative for myalgias.   Skin: Negative for rash.   Neurological: Negative for dizziness, weakness, headaches and paresthesias.   Psychiatric/Behavioral: Negative for mood changes. The patient is nervous/anxious.        Chills with cold exposure- not related to illness    Gerd- controlled off PPI.   Pt. Never took Lisinopril.     Pre-diabetes, is active at work.       OBJECTIVE:   /80   Pulse 60   Temp 99.4  F (37.4  C) (Temporal)   Ht 1.855 m (6' 1.03\")   Wt 119.7 kg (263 lb 12.8 oz)   SpO2 98%   BMI 34.77 kg/m      Physical Exam  GENERAL: healthy, alert and no distress  EYES: Eyes grossly normal to inspection, PERRL and conjunctivae and sclerae normal  HENT: ear canals and TM's normal, nose and mouth without ulcers or lesions  NECK: no adenopathy, no asymmetry, masses, or scars and thyroid normal to palpation  RESP: lungs clear to auscultation - no rales, rhonchi or wheezes  CV: regular rate and rhythm, normal S1 S2, no S3 or S4, no murmur, click or rub, no peripheral edema and peripheral pulses strong  ABDOMEN: obese, soft, nontender, without hepatosplenomegaly or masses and bowel sounds normal   (male): no hernias  RECTAL: deferred  MS: no gross musculoskeletal defects noted, no edema, left 5th toe with residual swelling, no redness  SKIN: no suspicious lesions or rashes  NEURO: Normal strength and tone, mentation intact and speech normal  PSYCH: mentation appears normal, affect normal/bright    Diagnostic Test Results:  Labs reviewed in Epic  Results for orders placed or performed in visit on 01/30/23   Hemoglobin A1c     Status: Abnormal   Result Value Ref Range    Hemoglobin A1C 6.3 (H) 0.0 - 5.6 %   CBC with platelets and differential     Status: None   Result Value Ref Range    WBC Count 7.9 4.0 - 11.0 10e3/uL    RBC Count 5.90 4.40 - 5.90 10e6/uL    Hemoglobin 15.7 13.3 - 17.7 g/dL    Hematocrit 47.3 40.0 - 53.0 %    MCV 80 78 - " 100 fL    MCH 26.6 26.5 - 33.0 pg    MCHC 33.2 31.5 - 36.5 g/dL    RDW 13.6 10.0 - 15.0 %    Platelet Count 195 150 - 450 10e3/uL    % Neutrophils 51 %    % Lymphocytes 37 %    % Monocytes 8 %    % Eosinophils 2 %    % Basophils 0 %    % Immature Granulocytes 0 %    Absolute Neutrophils 4.1 1.6 - 8.3 10e3/uL    Absolute Lymphocytes 3.0 0.8 - 5.3 10e3/uL    Absolute Monocytes 0.6 0.0 - 1.3 10e3/uL    Absolute Eosinophils 0.2 0.0 - 0.7 10e3/uL    Absolute Basophils 0.0 0.0 - 0.2 10e3/uL    Absolute Immature Granulocytes 0.0 <=0.4 10e3/uL   CBC with Platelets & Differential     Status: None    Narrative    The following orders were created for panel order CBC with Platelets & Differential.  Procedure                               Abnormality         Status                     ---------                               -----------         ------                     CBC with platelets and d...[[id number]]                      Final result                 Please view results for these tests on the individual orders.       ASSESSMENT/PLAN:       ICD-10-CM    1. Routine medical exam  Z00.00 CBC with Platelets & Differential     Lipid panel reflex to direct LDL Fasting     Hemoglobin A1c     Basic metabolic panel     Prostate Specific Antigen Screen     TSH with free T4 reflex     CBC with Platelets & Differential     Lipid panel reflex to direct LDL Fasting     Hemoglobin A1c     Basic metabolic panel     Prostate Specific Antigen Screen     TSH with free T4 reflex      2. Class 1 obesity due to excess calories without serious comorbidity in adult, unspecified BMI  E66.09 OFFICE/OUTPT VISIT,EST,LEVL IV      3. Prediabetes  R73.03 OFFICE/OUTPT VISIT,EST,LEVL IV      4. FAUZIA (obstructive sleep apnea)  G47.33 OFFICE/OUTPT VISIT,EST,LEVL IV    did not tolerate Cpap      5. Gastroesophageal reflux disease without esophagitis  K21.9 OFFICE/OUTPT VISIT,EST,LEVL IV      6. Recurrent major depressive disorder, in full remission (H)   "F33.42 OFFICE/OUTPT VISIT,ERICJLUIS IV          Patient has been advised of split billing requirements and indicates understanding: Yes      COUNSELING:   Reviewed preventive health counseling, as reflected in patient instructions       Regular exercise       Healthy diet/nutrition       Immunizations    Declined: all due to Concerns about side effects/safety               Colorectal cancer screening- Cologuard UTD       Prostate cancer screening    Gerd- controlled off PPI, weight loss advised.     Upcoming pre-op for left knee arthritis. Continue plan.     toe injury- pt. Declines work-up today, warning signs discussed.     FAUZIA- weight loss as able. Not able to tolerate Cpap    Pre-diabetes, reviewed with patient, monitoring labs again today.     Depression- stable off meds, continue active monitoring.     BMI:   Estimated body mass index is 34.77 kg/m  as calculated from the following:    Height as of this encounter: 1.855 m (6' 1.03\").    Weight as of this encounter: 119.7 kg (263 lb 12.8 oz).   Weight management plan: Discussed healthy diet and exercise guidelines      He reports that he has never smoked. He has never used smokeless tobacco.        DAWOOD Cody CNP  [facility] [place]  "

## 2023-01-31 ENCOUNTER — TELEPHONE (OUTPATIENT)
Dept: ORTHOPEDICS | Facility: CLINIC | Age: 58
End: 2023-01-31

## 2023-01-31 RX ORDER — ATORVASTATIN CALCIUM 20 MG/1
20 TABLET, FILM COATED ORAL DAILY
Qty: 90 TABLET | Refills: 0 | Status: SHIPPED | OUTPATIENT
Start: 2023-01-31 | End: 2023-10-31

## 2023-01-31 NOTE — TELEPHONE ENCOUNTER
Patient Returning Call    Reason for call:  Pt wants to discuss meds    Information relayed to patient:  NA    Patient has additional questions:  No    What are your questions/concerns:  Wants clarification on A1C meds    [facility] or would you prefer to receive a phone call?:   Patient would prefer a phone call   Okay to leave a detailed message?: Yes at Home number on file [phone] (home)

## 2023-01-31 NOTE — TELEPHONE ENCOUNTER
"Spoke with Bob regarding his A1c and read back the messages in his mychart under \"results note\" and did let him know that Sangeetha Salazar would like him to start Lipitor.    Patient is agreeable to trying the Lipitor and would like the script sent to [facility] in [facility].    Gala Bowen RN  [facility] ~ Registered Nurse  Clinic Triage ~ [facility]  January 31, 2023'  "

## 2023-01-31 NOTE — LETTER
February 1, 2023      RE: Trung Dubois  [id number] [facility] [address]        To whom it may concern:    Trung Dubois is under my professional care. Please allow him time to take extra breaks during his shift to manage his knee pain. These restrictions last until his visit for surgery on 2/9/23 at which point new restrictions will be given.     Sincerely,      Daniel Rios MD

## 2023-01-31 NOTE — TELEPHONE ENCOUNTER
[facility] Call Center    Phone Message    May a detailed message be left on voicemail: yes     Reason for Call: Form or Letter   Type or form/letter needing completion: needing letter from Dr. Rios stating what work restrictions are for knee pain   Provider: Dr. Daniel Rios     Date form needed: 01/31/2023     Once completed: Pt asking for Letter to be emailed:    [email or web address]    Any questions - pt can be reached at [phone].          Action Taken: Other: Dr. Rios    Travel Screening: Not Applicable

## 2023-02-01 NOTE — TELEPHONE ENCOUNTER
Called Pt to clarify what restrictions he required for his job prior to surgery. Pt states that due to job they have extended periods of time where they do not move their knee and this increases the pain, needs to be able to take periodic breaks to retain ROM in knee.     Douglas BERGERON ATC

## 2023-03-05 ENCOUNTER — NURSE TRIAGE (OUTPATIENT)
Dept: NURSING | Facility: CLINIC | Age: 58
End: 2023-03-05
Payer: COMMERCIAL

## 2023-03-06 ENCOUNTER — OFFICE VISIT (OUTPATIENT)
Dept: FAMILY MEDICINE | Facility: CLINIC | Age: 58
End: 2023-03-06
Payer: OTHER MISCELLANEOUS

## 2023-03-06 VITALS
HEART RATE: 60 BPM | SYSTOLIC BLOOD PRESSURE: 128 MMHG | OXYGEN SATURATION: 94 % | HEIGHT: 73 IN | TEMPERATURE: 98.3 F | WEIGHT: 264.4 LBS | DIASTOLIC BLOOD PRESSURE: 78 MMHG | BODY MASS INDEX: 35.04 KG/M2

## 2023-03-06 DIAGNOSIS — M25.562 LEFT KNEE PAIN, UNSPECIFIED CHRONICITY: ICD-10-CM

## 2023-03-06 DIAGNOSIS — E78.5 HYPERLIPIDEMIA LDL GOAL <130: ICD-10-CM

## 2023-03-06 DIAGNOSIS — E66.01 MORBID OBESITY (H): ICD-10-CM

## 2023-03-06 DIAGNOSIS — Z01.818 PRE-OP EXAM: Primary | ICD-10-CM

## 2023-03-06 LAB
ERYTHROCYTE [DISTWIDTH] IN BLOOD BY AUTOMATED COUNT: 13.6 % (ref 10–15)
HCT VFR BLD AUTO: 43.9 % (ref 40–53)
HGB BLD-MCNC: 14.7 G/DL (ref 13.3–17.7)
MCH RBC QN AUTO: 27.1 PG (ref 26.5–33)
MCHC RBC AUTO-ENTMCNC: 33.5 G/DL (ref 31.5–36.5)
MCV RBC AUTO: 81 FL (ref 78–100)
PLATELET # BLD AUTO: 205 10E3/UL (ref 150–450)
RBC # BLD AUTO: 5.43 10E6/UL (ref 4.4–5.9)
WBC # BLD AUTO: 7.3 10E3/UL (ref 4–11)

## 2023-03-06 PROCEDURE — 99214 OFFICE O/P EST MOD 30 MIN: CPT | Performed by: NURSE PRACTITIONER

## 2023-03-06 PROCEDURE — 85027 COMPLETE CBC AUTOMATED: CPT | Performed by: NURSE PRACTITIONER

## 2023-03-06 PROCEDURE — 36415 COLL VENOUS BLD VENIPUNCTURE: CPT | Performed by: NURSE PRACTITIONER

## 2023-03-06 ASSESSMENT — PAIN SCALES - GENERAL: PAINLEVEL: SEVERE PAIN (6)
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
## 2023-03-06 NOTE — RESULT ENCOUNTER NOTE
Bob,  I have reviewed your labs, and they are all normal. If you have questions, please notify me through MyChart or a telephone call.   Okay for surgery.   Sangeetha Salazar, DNP

## 2023-03-06 NOTE — TELEPHONE ENCOUNTER
Pt calling to see if he needs to fast before his pre-op appointment tomorrow. He had lab work done 1/30. Per scheduling he silva not need to fast before his appointment.  Jennifer Hart RN on 3/5/2023 at 8:28 PM      Reason for Disposition    General information question, no triage required and triager able to answer question    Protocols used: INFORMATION ONLY CALL - NO TRIAGE-A-

## 2023-03-12 ENCOUNTER — NURSE TRIAGE (OUTPATIENT)
Dept: NURSING | Facility: CLINIC | Age: 58
End: 2023-03-12
Payer: COMMERCIAL

## 2023-03-13 NOTE — TELEPHONE ENCOUNTER
Goal Outcome Evaluation:               Patient is alert and oriented and able to make needs known. Patient has had minimal c/o pain this shift. Both nephrostomy bags and catheters patent this shift. Suprapubic catheter continuously irrigating small to mid-size clots.    Our goal is to have forms completed with 72 hours, however some forms may require a visit or additional information.    Who is the form from?: [facility] (if other please explain)  Where the form came from: form was faxed in  What clinic location was the form placed at?: [place]  Where the form was placed: [facility]    Has the patient signed a consent form for release of information? YES    Call taken on 6/21/2019 at 4:13 PM by Lizette Sanches

## 2023-03-13 NOTE — TELEPHONE ENCOUNTER
Having arthroscopy, meniscectomy on 3/23.  Had some questions about activity level after surgery.  Has been told he will be NWB for three days and using crutches. Answered patients questions.    Nahomy Amor RN, ONC, MA  [facility] Triage Nurse Advisor      Additional Information    Information only question and nurse able to answer    Protocols used: NO PROTOCOL AVAILABLE - INFORMATION ONLY-A-[place]

## 2023-03-17 DIAGNOSIS — M25.562 LEFT KNEE PAIN, UNSPECIFIED CHRONICITY: Primary | ICD-10-CM

## 2023-03-20 ENCOUNTER — TELEPHONE (OUTPATIENT)
Dept: ORTHOPEDICS | Facility: CLINIC | Age: 58
End: 2023-03-20
Payer: COMMERCIAL

## 2023-03-20 NOTE — TELEPHONE ENCOUNTER
Called Pt to cancel Post-Op visit made before their surgery was rescheduled. Pt was appreciative of call and had no questions.    Douglas BERGERON ATC

## 2023-03-22 ENCOUNTER — ANESTHESIA EVENT (OUTPATIENT)
Dept: SURGERY | Facility: AMBULATORY SURGERY CENTER | Age: 58
End: 2023-03-22
Payer: OTHER MISCELLANEOUS

## 2023-03-23 ENCOUNTER — ANESTHESIA (OUTPATIENT)
Dept: SURGERY | Facility: AMBULATORY SURGERY CENTER | Age: 58
End: 2023-03-23
Payer: OTHER MISCELLANEOUS

## 2023-03-23 ENCOUNTER — HOSPITAL ENCOUNTER (OUTPATIENT)
Facility: AMBULATORY SURGERY CENTER | Age: 58
Discharge: HOME | End: 2023-03-23
Attending: ORTHOPAEDIC SURGERY | Admitting: ORTHOPAEDIC SURGERY
Payer: OTHER MISCELLANEOUS

## 2023-03-23 VITALS
RESPIRATION RATE: 16 BRPM | WEIGHT: 265 LBS | DIASTOLIC BLOOD PRESSURE: 100 MMHG | TEMPERATURE: 97.1 F | SYSTOLIC BLOOD PRESSURE: 148 MMHG | OXYGEN SATURATION: 98 % | HEART RATE: 56 BPM | BODY MASS INDEX: 35.12 KG/M2

## 2023-03-23 DIAGNOSIS — M25.562 LEFT KNEE PAIN, UNSPECIFIED CHRONICITY: ICD-10-CM

## 2023-03-23 DIAGNOSIS — M25.562 LEFT KNEE PAIN, UNSPECIFIED CHRONICITY: Primary | ICD-10-CM

## 2023-03-23 PROCEDURE — G8907 PT DOC NO EVENTS ON DISCHARG: HCPCS

## 2023-03-23 PROCEDURE — G8916 PT W IV AB GIVEN ON TIME: HCPCS

## 2023-03-23 PROCEDURE — 29881 ARTHRS KNE SRG MNISECTMY M/L: CPT | Mod: LT | Performed by: ORTHOPAEDIC SURGERY

## 2023-03-23 PROCEDURE — 29881 ARTHRS KNE SRG MNISECTMY M/L: CPT | Mod: LT

## 2023-03-23 PROCEDURE — G8918 PT W/O PREOP ORDER IV AB PRO: HCPCS

## 2023-03-23 RX ORDER — ACETAMINOPHEN 325 MG/1
975 TABLET ORAL ONCE
Status: COMPLETED | OUTPATIENT
Start: 2023-03-23 | End: 2023-03-23

## 2023-03-23 RX ORDER — ONDANSETRON 2 MG/ML
4 INJECTION INTRAMUSCULAR; INTRAVENOUS EVERY 30 MIN PRN
Status: DISCONTINUED | OUTPATIENT
Start: 2023-03-23 | End: 2023-03-24 | Stop reason: HOSPADM

## 2023-03-23 RX ORDER — CEFAZOLIN SODIUM IN 0.9 % NACL 3 G/100 ML
3 INTRAVENOUS SOLUTION, PIGGYBACK (ML) INTRAVENOUS SEE ADMIN INSTRUCTIONS
Status: DISCONTINUED | OUTPATIENT
Start: 2023-03-23 | End: 2023-03-24 | Stop reason: HOSPADM

## 2023-03-23 RX ORDER — OXYCODONE HYDROCHLORIDE 5 MG/1
10 TABLET ORAL EVERY 4 HOURS PRN
Status: DISCONTINUED | OUTPATIENT
Start: 2023-03-23 | End: 2023-03-24 | Stop reason: HOSPADM

## 2023-03-23 RX ORDER — ACETAMINOPHEN 325 MG/1
650 TABLET ORAL EVERY 4 HOURS PRN
Qty: 50 TABLET | Refills: 0 | Status: SHIPPED | OUTPATIENT
Start: 2023-03-23 | End: 2023-10-31

## 2023-03-23 RX ORDER — ONDANSETRON 4 MG/1
4 TABLET, ORALLY DISINTEGRATING ORAL EVERY 30 MIN PRN
Status: DISCONTINUED | OUTPATIENT
Start: 2023-03-23 | End: 2023-03-24 | Stop reason: HOSPADM

## 2023-03-23 RX ORDER — LIDOCAINE HYDROCHLORIDE 20 MG/ML
INJECTION, SOLUTION INFILTRATION; PERINEURAL PRN
Status: DISCONTINUED | OUTPATIENT
Start: 2023-03-23 | End: 2023-03-23

## 2023-03-23 RX ORDER — LIDOCAINE 40 MG/G
CREAM TOPICAL
Status: DISCONTINUED | OUTPATIENT
Start: 2023-03-23 | End: 2023-03-24 | Stop reason: HOSPADM

## 2023-03-23 RX ORDER — PROPOFOL 10 MG/ML
INJECTION, EMULSION INTRAVENOUS PRN
Status: DISCONTINUED | OUTPATIENT
Start: 2023-03-23 | End: 2023-03-23

## 2023-03-23 RX ORDER — DEXAMETHASONE SODIUM PHOSPHATE 4 MG/ML
INJECTION, SOLUTION INTRA-ARTICULAR; INTRALESIONAL; INTRAMUSCULAR; INTRAVENOUS; SOFT TISSUE PRN
Status: DISCONTINUED | OUTPATIENT
Start: 2023-03-23 | End: 2023-03-23

## 2023-03-23 RX ORDER — BUPIVACAINE HYDROCHLORIDE AND EPINEPHRINE 5; 5 MG/ML; UG/ML
INJECTION, SOLUTION PERINEURAL PRN
Status: DISCONTINUED | OUTPATIENT
Start: 2023-03-23 | End: 2023-03-23 | Stop reason: HOSPADM

## 2023-03-23 RX ORDER — HYDRALAZINE HYDROCHLORIDE 20 MG/ML
2.5-5 INJECTION INTRAMUSCULAR; INTRAVENOUS EVERY 10 MIN PRN
Status: DISCONTINUED | OUTPATIENT
Start: 2023-03-23 | End: 2023-03-24 | Stop reason: HOSPADM

## 2023-03-23 RX ORDER — FENTANYL CITRATE 50 UG/ML
25 INJECTION, SOLUTION INTRAMUSCULAR; INTRAVENOUS EVERY 5 MIN PRN
Status: DISCONTINUED | OUTPATIENT
Start: 2023-03-23 | End: 2023-03-24 | Stop reason: HOSPADM

## 2023-03-23 RX ORDER — SODIUM CHLORIDE, SODIUM LACTATE, POTASSIUM CHLORIDE, CALCIUM CHLORIDE 600; 310; 30; 20 MG/100ML; MG/100ML; MG/100ML; MG/100ML
INJECTION, SOLUTION INTRAVENOUS CONTINUOUS PRN
Status: DISCONTINUED | OUTPATIENT
Start: 2023-03-23 | End: 2023-03-23

## 2023-03-23 RX ORDER — OXYCODONE HYDROCHLORIDE 5 MG/1
5-10 TABLET ORAL EVERY 4 HOURS PRN
Qty: 10 TABLET | Refills: 0 | Status: SHIPPED | OUTPATIENT
Start: 2023-03-23 | End: 2023-09-06

## 2023-03-23 RX ORDER — METOPROLOL TARTRATE 1 MG/ML
1-2 INJECTION, SOLUTION INTRAVENOUS EVERY 5 MIN PRN
Status: DISCONTINUED | OUTPATIENT
Start: 2023-03-23 | End: 2023-03-24 | Stop reason: HOSPADM

## 2023-03-23 RX ORDER — ONDANSETRON 2 MG/ML
INJECTION INTRAMUSCULAR; INTRAVENOUS PRN
Status: DISCONTINUED | OUTPATIENT
Start: 2023-03-23 | End: 2023-03-23

## 2023-03-23 RX ORDER — CEFAZOLIN SODIUM IN 0.9 % NACL 3 G/100 ML
3 INTRAVENOUS SOLUTION, PIGGYBACK (ML) INTRAVENOUS
Status: COMPLETED | OUTPATIENT
Start: 2023-03-23 | End: 2023-03-23

## 2023-03-23 RX ORDER — OXYCODONE HYDROCHLORIDE 5 MG/1
5 TABLET ORAL EVERY 4 HOURS PRN
Status: DISCONTINUED | OUTPATIENT
Start: 2023-03-23 | End: 2023-03-24 | Stop reason: HOSPADM

## 2023-03-23 RX ORDER — OXYCODONE HYDROCHLORIDE 5 MG/1
5 TABLET ORAL
Status: COMPLETED | OUTPATIENT
Start: 2023-03-23 | End: 2023-03-23

## 2023-03-23 RX ORDER — ONDANSETRON 4 MG/1
4 TABLET, ORALLY DISINTEGRATING ORAL
Status: DISCONTINUED | OUTPATIENT
Start: 2023-03-23 | End: 2023-03-24 | Stop reason: HOSPADM

## 2023-03-23 RX ORDER — ONDANSETRON 4 MG/1
4 TABLET, ORALLY DISINTEGRATING ORAL EVERY 8 HOURS PRN
Qty: 4 TABLET | Refills: 0 | Status: SHIPPED | OUTPATIENT
Start: 2023-03-23 | End: 2023-09-06

## 2023-03-23 RX ORDER — SODIUM CHLORIDE, SODIUM LACTATE, POTASSIUM CHLORIDE, CALCIUM CHLORIDE 600; 310; 30; 20 MG/100ML; MG/100ML; MG/100ML; MG/100ML
INJECTION, SOLUTION INTRAVENOUS CONTINUOUS
Status: DISCONTINUED | OUTPATIENT
Start: 2023-03-23 | End: 2023-03-24 | Stop reason: HOSPADM

## 2023-03-23 RX ORDER — FENTANYL CITRATE 50 UG/ML
25 INJECTION, SOLUTION INTRAMUSCULAR; INTRAVENOUS
Status: DISCONTINUED | OUTPATIENT
Start: 2023-03-23 | End: 2023-03-24 | Stop reason: HOSPADM

## 2023-03-23 RX ORDER — HYDROXYZINE PAMOATE 25 MG/1
25 CAPSULE ORAL 3 TIMES DAILY PRN
Qty: 30 CAPSULE | Refills: 0 | Status: SHIPPED | OUTPATIENT
Start: 2023-03-23 | End: 2023-09-06

## 2023-03-23 RX ORDER — AMOXICILLIN 250 MG
1-2 CAPSULE ORAL 2 TIMES DAILY
Qty: 30 TABLET | Refills: 0 | Status: SHIPPED | OUTPATIENT
Start: 2023-03-23 | End: 2023-09-06

## 2023-03-23 RX ORDER — FENTANYL CITRATE 50 UG/ML
50 INJECTION, SOLUTION INTRAMUSCULAR; INTRAVENOUS EVERY 5 MIN PRN
Status: DISCONTINUED | OUTPATIENT
Start: 2023-03-23 | End: 2023-03-24 | Stop reason: HOSPADM

## 2023-03-23 RX ORDER — PROPOFOL 10 MG/ML
INJECTION, EMULSION INTRAVENOUS CONTINUOUS PRN
Status: DISCONTINUED | OUTPATIENT
Start: 2023-03-23 | End: 2023-03-23

## 2023-03-23 RX ORDER — ACETAMINOPHEN 325 MG/1
650 TABLET ORAL
Status: DISCONTINUED | OUTPATIENT
Start: 2023-03-23 | End: 2023-03-24 | Stop reason: HOSPADM

## 2023-03-23 RX ORDER — HYDROXYZINE HYDROCHLORIDE 25 MG/1
25 TABLET, FILM COATED ORAL
Status: DISCONTINUED | OUTPATIENT
Start: 2023-03-23 | End: 2023-03-24 | Stop reason: HOSPADM

## 2023-03-23 RX ADMIN — PROPOFOL 300 MG: 10 INJECTION, EMULSION INTRAVENOUS at 13:41

## 2023-03-23 RX ADMIN — Medication 3 G: at 13:29

## 2023-03-23 RX ADMIN — PROPOFOL 50 MCG/KG/MIN: 10 INJECTION, EMULSION INTRAVENOUS at 13:42

## 2023-03-23 RX ADMIN — OXYCODONE HYDROCHLORIDE 5 MG: 5 TABLET ORAL at 14:45

## 2023-03-23 RX ADMIN — LIDOCAINE HYDROCHLORIDE 60 MG: 20 INJECTION, SOLUTION INFILTRATION; PERINEURAL at 13:41

## 2023-03-23 RX ADMIN — Medication 0.5 MG: at 13:47

## 2023-03-23 RX ADMIN — DEXAMETHASONE SODIUM PHOSPHATE 4 MG: 4 INJECTION, SOLUTION INTRA-ARTICULAR; INTRALESIONAL; INTRAMUSCULAR; INTRAVENOUS; SOFT TISSUE at 14:05

## 2023-03-23 RX ADMIN — SODIUM CHLORIDE, SODIUM LACTATE, POTASSIUM CHLORIDE, CALCIUM CHLORIDE: 600; 310; 30; 20 INJECTION, SOLUTION INTRAVENOUS at 12:25

## 2023-03-23 RX ADMIN — SODIUM CHLORIDE, SODIUM LACTATE, POTASSIUM CHLORIDE, CALCIUM CHLORIDE: 600; 310; 30; 20 INJECTION, SOLUTION INTRAVENOUS at 13:29

## 2023-03-23 RX ADMIN — ONDANSETRON 4 MG: 2 INJECTION INTRAMUSCULAR; INTRAVENOUS at 14:09

## 2023-03-23 RX ADMIN — ACETAMINOPHEN 975 MG: 325 TABLET ORAL at 12:00

## 2023-03-23 NOTE — ANESTHESIA POSTPROCEDURE EVALUATION
Patient: Trung Dubois    Procedure: Procedure(s):  Examination under anesthesia, knee arthroscopy, meniscectomy       Anesthesia Type:  General    Note:  Disposition: Outpatient   Postop Pain Control: Uneventful            Sign Out: Well controlled pain   PONV: No   Neuro/Psych: Uneventful            Sign Out: Acceptable/Baseline neuro status   Airway/Respiratory: Uneventful            Sign Out: Acceptable/Baseline resp. status   CV/Hemodynamics: Uneventful            Sign Out: Acceptable CV status; No obvious hypovolemia; No obvious fluid overload   Other NRE: NONE   DID A NON-ROUTINE EVENT OCCUR? No           Last vitals:  Vitals Value Taken Time   /95 03/23/23 1445   Temp 96.9  F (36.1  C) 03/23/23 1419   Pulse 57 03/23/23 1458   Resp 8 03/23/23 1458   SpO2 96 % 03/23/23 1458   Vitals shown include unvalidated device data.    Electronically Signed By: Pal Banks MD  March 23, 2023  2:59 PM
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
## 2023-03-23 NOTE — INTERVAL H&P NOTE
"I have reviewed the surgical (or preoperative) H&P that is linked to this encounter, and examined the patient. There are no significant changes    Clinical Conditions Present on Arrival:  Clinically Significant Risk Factors Present on Admission                    # Obesity: Estimated body mass index is 35.04 kg/m  as calculated from the following:    Height as of 3/6/23: 1.85 m (6' 0.84\").    Weight as of 3/6/23: 119.9 kg (264 lb 6.4 oz).       "

## 2023-03-23 NOTE — OP NOTE
PREOPERATIVE DIAGNOSIS: Left knee lateral meniscus tear.   POSTOPERATIVE DIAGNOSIS: Left knee lateral meniscus tear.   PROCEDURES:   1. Examination under anesthesia, left knee.   2. Left knee arthroscopy, partial lateral meniscectomy.   SURGEON: Daniel Rios MD   ASSISTANT: none  OPERATIVE INDICATIONS: Trung is a very pleasant 57 year old male who presented to my clinic with lateral joint line pain. An MRI had been obtained by his primary care physician demonstrating a displaced lateral meniscus tear. I reviewed with him his history, physical and imaging exam. I felt that he would be a candidate for knee arthroscopy, partial lateral meniscectomy. He was apprised of the risks and benefits of surgery and desired to proceed despite the risks.   OPERATIVE DETAILS: In the preoperative area, the patient's informed consent was reviewed and he desired to proceed. Left knee was marked. The patient was in agreement. he was taken to the operating room, timeout was performed and all parties were in agreement. Preoperative antibiotics was given within 1 hour of time of surgery. He was placed supine on the operating room table, surrendered to LMA anesthesia and examination under anesthesia was performed with the following findings: Full passive range of motion 0 to 135 degrees. No patellar instability. Stable to varus and valgus stress at 0 and 30 degrees. Stable anterior, posterior drawer. No pivot shift. Negative dial test. Posterior drawer 0. Lachman 0. At this time, a bump was placed underneath the ipsilateral hip. Egg crate was placed beneath the well leg. No tourniquet was placed. A side post was utilized. Left leg was prepped and draped in the usual sterile fashion. Standard anteromedial and anterolateral arthroscopic portals were created and diagnostic arthroscopy was performed with the following findings: Medial patellar facet was Grade 1, lateral patellar facet was Grade 1, central patellar ridge was Grade 1,  central trochlea was Grade 4, medial femoral condyle was Grade 3, medial tibial plateau was Grade 2, lateral femoral condyle was Grade 1, lateral tibial plateau was Grade 3. Lateral meniscus had a displaced tear. Medial meniscus was intact. ACL and PCL were intact. Popliteus tendon intact.    Alternating then between a motorized shaver and a small biter, a partial lateral meniscectomy was performed until a balanced stable rim of meniscal tissue remained. At this time, all excess arthroscopic fluid and meniscal debris was removed from the knee joint. Copious irrigation was performed. Portal sites were closed with nylon. Sterile dressings were applied. The patient was awakened from anesthesia and transferred to the recovery room in stable condition with stable vital signs.   COMPLICATIONS: None apparent.   DRAINS: None.   SPECIMENS: None.   ESTIMATED BLOOD LOSS: 5 mL.   TOURNIQUET: No tourniquet was placed.  POSTOPERATIVE PLAN: The patient will be weightbearing as tolerated, range of motion as tolerated, can shower on postoperative day #3. No submerging the wounds. No chemical DVT prophylaxis. Follow up in my Orthopedic Clinic in 1 week time. No running, jumping, pounding sports for 6 weeks.

## 2023-03-23 NOTE — DISCHARGE INSTRUCTIONS
[facility]   Adult Discharge Orders & Instructions     For 24 hours after surgery    Get plenty of rest.  A responsible adult must stay with you for at least 24 hours after you leave the hospital.   Do not drive or use heavy equipment.  If you have weakness or tingling, don't drive or use heavy equipment until this feeling goes away.  Do not drink alcohol.  Avoid strenuous or risky activities.  Ask for help when climbing stairs.   You may feel lightheaded.  IF so, sit for a few minutes before standing.  Have someone help you get up.   If you have nausea (feel sick to your stomach): Drink only clear liquids such as apple juice, ginger ale, broth or 7-Up.  Rest may also help.  Be sure to drink enough fluids.  Move to a regular diet as you feel able.  You may have a slight fever. Call the doctor if your fever is over 100 F (37.7 C) (taken under the tongue) or lasts longer than 24 hours.  You may have a dry mouth, a sore throat, muscle aches or trouble sleeping.  These should go away after 24 hours.  Do not make important or legal decisions.     Call your doctor for any of the followin.  Signs of infection (fever, growing tenderness at the surgery site, a large amount of drainage or bleeding, severe pain, foul-smelling drainage, redness, swelling).    2. It has been over 8 to 10 hours since surgery and you are still not able to urinate (pass water).    3.  Headache for over 24 hours.    4.  Numbness, tingling or weakness the day after surgery (if you had spinal anesthesia).                  5. Signs of Covid-19 infection (temperature over 100 degrees, shortness of breath, cough, loss of taste/smell, generalized body aches, persistent headache,                  chills, sore throat, nausea/vomiting/diarrhea.

## 2023-03-23 NOTE — ANESTHESIA CARE TRANSFER NOTE
Patient: Trung Dubois    Procedure: Procedure(s):  Examination under anesthesia, knee arthroscopy, meniscectomy       Diagnosis: Left knee pain, unspecified chronicity [M25.562]  Diagnosis Additional Information: No value filed.    Anesthesia Type:   General     Note:    Oropharynx: oropharynx clear of all foreign objects and spontaneously breathing  Level of Consciousness: drowsy  Oxygen Supplementation: face mask  Level of Supplemental Oxygen (L/min / FiO2): 8  Independent Airway: airway patency satisfactory and stable  Dentition: dentition unchanged  Vital Signs Stable: post-procedure vital signs reviewed and stable  Report to RN Given: handoff report given  Patient transferred to: PACU    Handoff Report: Identifed the Patient, Identified the Reponsible Provider, Reviewed the pertinent medical history, Discussed the surgical course, Reviewed Intra-OP anesthesia mangement and issues during anesthesia, Set expectations for post-procedure period and Allowed opportunity for questions and acknowledgement of understanding      Vitals:  Vitals Value Taken Time   BP     Temp     Pulse 50 03/23/23 1425   Resp 8 03/23/23 1425   SpO2 100 % 03/23/23 1425   Vitals shown include unvalidated device data.    Electronically Signed By: DAWOOD Hale CRNA  March 23, 2023  2:26 PM

## 2023-03-24 ENCOUNTER — TELEPHONE (OUTPATIENT)
Dept: ORTHOPEDICS | Facility: CLINIC | Age: 58
End: 2023-03-24
Payer: COMMERCIAL

## 2023-03-24 ENCOUNTER — TELEPHONE (OUTPATIENT)
Dept: ORTHOPEDICS | Facility: CLINIC | Age: 58
End: 2023-03-24

## 2023-03-24 NOTE — TELEPHONE ENCOUNTER
[facility] Call Center    Phone Message    May a detailed message be left on voicemail: yes     Reason for Call: Medication Refill Request    Has the patient contacted the pharmacy for the refill? Yes   Name of medication being requested: oxy  Provider who prescribed the medication: Dr. Rios  Pharmacy: [facility] in [place] on [address]  Date medication is needed: as soon as possible     Patient's WC is delaying the refill of the oxy and can take up to 2 days.    Patient is requesting a re-write of the oxy prescription to be sent to [facility] in [place] and to bill his private insurance.  He will then pay out-of-pocket and deal with the WC later.    In addition, patient wants to know if Ibuprofen is OK for him to take until he gets the oxy?  Says he's starting to feel some tingling.    Action Taken: Message routed to:  Adult Clinics: Orthopedics p [id number]    Travel Screening: Not Applicable

## 2023-03-24 NOTE — TELEPHONE ENCOUNTER
[facility] Call Center    Phone Message    May a detailed message be left on voicemail: yes     Reason for Call: Other: Bob Donahue is due for a post op but I am not seeing anything with  , please call him to get him scheduled at [facility]. Thank you,Shira     Action Taken: Other: [facility]    Travel Screening: Not Applicable

## 2023-03-24 NOTE — TELEPHONE ENCOUNTER
Called Pt to let them know of miscommunication from pharmacy. Pt is able to pay out of pocket cost of $12 for prescription until  can approve and reimburse or they can wait until review is complete by  company. Pt was thankful for clarification and would  items from pharmacy.    Douglas BERGERON ATC

## 2023-03-27 ENCOUNTER — THERAPY VISIT (OUTPATIENT)
Dept: PHYSICAL THERAPY | Facility: CLINIC | Age: 58
End: 2023-03-27
Attending: ORTHOPAEDIC SURGERY
Payer: OTHER MISCELLANEOUS

## 2023-03-27 DIAGNOSIS — R26.89 ANTALGIC GAIT: ICD-10-CM

## 2023-03-27 DIAGNOSIS — M25.562 LEFT KNEE PAIN, UNSPECIFIED CHRONICITY: ICD-10-CM

## 2023-03-27 DIAGNOSIS — Z98.890 S/P LATERAL MENISCECTOMY OF LEFT KNEE: ICD-10-CM

## 2023-03-27 PROCEDURE — 97161 PT EVAL LOW COMPLEX 20 MIN: CPT | Mod: GP | Performed by: PHYSICAL THERAPIST

## 2023-03-27 PROCEDURE — 97110 THERAPEUTIC EXERCISES: CPT | Mod: GP | Performed by: PHYSICAL THERAPIST

## 2023-03-27 ASSESSMENT — ACTIVITIES OF DAILY LIVING (ADL)
AS_A_RESULT_OF_YOUR_KNEE_INJURY,_HOW_WOULD_YOU_RATE_YOUR_CURRENT_LEVEL_OF_DAILY_ACTIVITY?: SEVERELY ABNORMAL
HOW_WOULD_YOU_RATE_THE_OVERALL_FUNCTION_OF_YOUR_KNEE_DURING_YOUR_USUAL_DAILY_ACTIVITIES?: SEVERELY ABNORMAL
WALK: ACTIVITY IS MINIMALLY DIFFICULT
LIMPING: THE SYMPTOM AFFECTS MY ACTIVITY MODERATELY
KNEE_ACTIVITY_OF_DAILY_LIVING_SCORE: 47.14
RAW_SCORE: 33
WEAKNESS: THE SYMPTOM AFFECTS MY ACTIVITY MODERATELY
SQUAT: ACTIVITY IS FAIRLY DIFFICULT
GIVING WAY, BUCKLING OR SHIFTING OF KNEE: THE SYMPTOM AFFECTS MY ACTIVITY MODERATELY
KNEEL ON THE FRONT OF YOUR KNEE: I AM UNABLE TO DO THE ACTIVITY
STIFFNESS: THE SYMPTOM AFFECTS MY ACTIVITY MODERATELY
KNEE_ACTIVITY_OF_DAILY_LIVING_SUM: 33
SWELLING: THE SYMPTOM AFFECTS MY ACTIVITY MODERATELY
GO DOWN STAIRS: ACTIVITY IS SOMEWHAT DIFFICULT
STAND: ACTIVITY IS MINIMALLY DIFFICULT
SIT WITH YOUR KNEE BENT: ACTIVITY IS FAIRLY DIFFICULT
RISE FROM A CHAIR: ACTIVITY IS SOMEWHAT DIFFICULT
HOW_WOULD_YOU_RATE_THE_CURRENT_FUNCTION_OF_YOUR_KNEE_DURING_YOUR_USUAL_DAILY_ACTIVITIES_ON_A_SCALE_FROM_0_TO_100_WITH_100_BEING_YOUR_LEVEL_OF_KNEE_FUNCTION_PRIOR_TO_YOUR_INJURY_AND_0_BEING_THE_INABILITY_TO_PERFORM_ANY_OF_YOUR_USUAL_DAILY_ACTIVITIES?: 25
GO UP STAIRS: ACTIVITY IS SOMEWHAT DIFFICULT
PAIN: THE SYMPTOM AFFECTS MY ACTIVITY MODERATELY

## 2023-03-27 NOTE — PROGRESS NOTES
Physical Therapy Initial Evaluation  Subjective:  The history is provided by the patient. No  was used.   Patient Health History  Trung Dubois being seen for S/P L Knee Lateral meniscectomy .     Date of Onset: DOI: 6/1/22 and DOS: 3/23/23.   Problem occurred: Fell off truck   Pain score: Ranges 2-3/10.  General health as reported by patient is fair.  Pertinent medical history includes: overweight.   Red flags:  None as reported by patient.  Medical allergies: none.   Surgeries include:  Orthopedic surgery. Other surgery history details: L Knee .    Current medications:  Pain medication and anti-inflammatory.    Current occupation is .   Primary job tasks include:  Prolonged standing, prolonged sitting, lifting/carrying, driving, repetitive tasks and pushing/pulling.                  Therapist Generated HPI Evaluation         Type of problem:  Left knee.    This is a new condition.  Condition occurred with:  A fall/slip.  Where condition occurred: at work.  Patient reports pain:  Anterior.  Pain is described as aching and is constant.  Pain is the same all the time.  Since onset symptoms are unchanged.  Associated symptoms:  Loss of motion/stiffness and loss of strength. Symptoms are exacerbated by weight bearing, walking, bending/squatting, descending stairs and ascending stairs  and relieved by NSAID's, ice and rest.      Restrictions due to condition include:  Currently not working due to present treatment.                          Objective:  Standing Alignment:              Knee:  Normal      Gait:    Gait Type:  Antalgic   Assistive Devices:  None      Flexibility/Screens:   Positive screens:  Knee    Lower Extremity:  Decreased left lower extremity flexibility:Quadriceps and Hamstrings                                                        Knee Evaluation:  ROM:      PROM    Hyperextension: Left: 0    Right:  3    Flexion: Left: 70    Right:  140        Ligament Testing:  Not  "Assessed                Special Tests: Not Assessed      Palpation:    Left knee tenderness present at:  Lateral Joint Line  Left knee tenderness not present at:  Medial Joint Line and Patellar Tendon    Edema:    Circumference:      Joint Line:  Left:  17.5\"   Right:  17\"    Mobility Testing:  Not Assessed            Functional Testing:  not assessed                  General     ROS    Assessment/Plan:    Patient is a 57 year old male with left side knee complaints.    Patient has the following significant findings with corresponding treatment plan.                Diagnosis 1:  S/P L Knee lateral meniscectomy  Pain -  hot/cold therapy, manual therapy, self management, education and home program  Decreased ROM/flexibility - manual therapy, therapeutic exercise, therapeutic activity and home program  Decreased joint mobility - manual therapy, therapeutic exercise, therapeutic activity and home program  Decreased strength - therapeutic exercise, therapeutic activities and home program  Edema - cold therapy and self management/home program  Impaired gait - gait training, assistive devices and home program  Impaired muscle performance - neuro re-education and home program  Decreased function - therapeutic activities and home program    Therapy Evaluation Codes:   1) History comprised of:   Personal factors that impact the plan of care:      Time since onset of symptoms.    Comorbidity factors that impact the plan of care are:      Overweight.     Medications impacting care: Anti-inflammatory and Pain.  2) Examination of Body Systems comprised of:   Body structures and functions that impact the plan of care:      Knee.   Activity limitations that impact the plan of care are:      Bending, Jumping, Lifting, Sports, Squatting/kneeling, Stairs, Standing, Walking and Working.  3) Clinical presentation characteristics are:   Stable/Uncomplicated.  4) Decision-Making    Low complexity using standardized patient assessment " instrument and/or measureable assessment of functional outcome.  Cumulative Therapy Evaluation is: Low complexity.    Previous and current functional limitations:  (See Goal Flow Sheet for this information)    Short term and Long term goals: (See Goal Flow Sheet for this information)     Communication ability:  Patient appears to be able to clearly communicate and understand verbal and written communication and follow directions correctly.  Treatment Explanation - The following has been discussed with the patient:   RX ordered/plan of care  Anticipated outcomes  Possible risks and side effects  This patient would benefit from PT intervention to resume normal activities.   Rehab potential is good.    Frequency:  1 X week, once daily  Duration:  for 12 weeks  Discharge Plan:  Achieve all LTG.  Independent in home treatment program.  Return to work with or without restrictions.  Return to previous functional level by discharge.  Reach maximal therapeutic benefit.    Please refer to the daily flowsheet for treatment today, total treatment time and time spent performing 1:1 timed codes.

## 2023-03-27 NOTE — TELEPHONE ENCOUNTER
3/27 Called and spoke to patient, appointment is currently scheduled.     [facility]   Orthopedics, Podiatry, Sports Medicine, Ent ,Eye , Audiology, Adult Endocrine & Diabetes, Nutrition & Medication Therapy Management Specialties   [facility]

## 2023-03-30 ENCOUNTER — ALLIED HEALTH/NURSE VISIT (OUTPATIENT)
Dept: NURSING | Facility: CLINIC | Age: 58
End: 2023-03-30
Payer: OTHER MISCELLANEOUS

## 2023-03-30 DIAGNOSIS — Z47.89 ORTHOPEDIC AFTERCARE: Primary | ICD-10-CM

## 2023-03-30 PROCEDURE — 99207 PR NO CHARGE LOS: CPT

## 2023-03-30 ASSESSMENT — PAIN SCALES - GENERAL: PAINLEVEL: MODERATE PAIN (4)

## 2023-03-30 NOTE — PROGRESS NOTES
Trung Dubois comes into clinic today at the request of Dr. Rios Ordering Provider for Suture Removal:  Incision was dry, clean and intact, incision cleansed with wound cleanser and sutures were removed. Pt tolerated the procedure. Benzoin and steristrips were placed per protocol and pt was instructed to leave them in place for 7-10 days. It is okay to shower with these in place, no need to cover. After this time the strerstrips will start loosen and should be removed.   .    S: Pt is seen today for 1 week post-op suture removal.     B: 1. Examination under anesthesia, left knee.   2. Left knee arthroscopy, partial lateral meniscectomy    A: Pt is walking without use of crutches, denies pain. Suture sites showed no redness, no inflammation, cool to the touch, intact edges and minimal swelling just proximal to the knee.      R: Discussed current PT exercises and instructed them to continue. Discussed return to work plan, will return at 2 week berta for 1 week of half days (5hrs/day), then 1 week at 8 hour days, followed by a return to full 5 days a week at 10-11 hours per day. Pt is aware of signs of infection to watch for. Will reach out if any progress stalls or regresses. Return to clinic in 5 weeks for 6 week post op visit.     This service provided today was under the supervising provider of the day Dr. Saunders, who was available if needed.    Douglas Lee ATC

## 2023-03-30 NOTE — LETTER
March 30, 2023      RE: Trung Dubois  [id number] [address] [id number]        To whom it may concern:    Trung Dubois is under my professional care for his left knee.     Bob may return to work on 4/10/23 with the following restrictions:    No more than 5 hours of work per day.   A maximum of 25 hours of work for 1 week.  No lifting, pushing, pulling, carrying more than 10-20lbs.    On 4/17/23 the restrictions will be updated to:    No more than 8 hours of work per day.  A maximum of 40 hours of work for 1 week.  No lifting, pushing, pulling, carrying more than 30-40lbs.     Beginning the week of 4/24/23 he may return to work with no restrictions.       Sincerely,      Douglas Lee ATC  For Scott Rios MD

## 2023-04-03 ENCOUNTER — THERAPY VISIT (OUTPATIENT)
Dept: PHYSICAL THERAPY | Facility: CLINIC | Age: 58
End: 2023-04-03
Payer: OTHER MISCELLANEOUS

## 2023-04-03 DIAGNOSIS — R26.89 ANTALGIC GAIT: ICD-10-CM

## 2023-04-03 DIAGNOSIS — Z98.890 S/P LATERAL MENISCECTOMY OF LEFT KNEE: Primary | ICD-10-CM

## 2023-04-03 PROCEDURE — 97110 THERAPEUTIC EXERCISES: CPT | Mod: 59 | Performed by: PHYSICAL THERAPIST

## 2023-04-03 PROCEDURE — 97530 THERAPEUTIC ACTIVITIES: CPT | Mod: GP | Performed by: PHYSICAL THERAPIST

## 2023-04-10 ENCOUNTER — THERAPY VISIT (OUTPATIENT)
Dept: PHYSICAL THERAPY | Facility: CLINIC | Age: 58
End: 2023-04-10
Payer: OTHER MISCELLANEOUS

## 2023-04-10 DIAGNOSIS — R26.89 ANTALGIC GAIT: ICD-10-CM

## 2023-04-10 DIAGNOSIS — Z98.890 S/P LATERAL MENISCECTOMY OF LEFT KNEE: Primary | ICD-10-CM

## 2023-04-10 PROCEDURE — 97110 THERAPEUTIC EXERCISES: CPT | Mod: GP | Performed by: PHYSICAL THERAPIST

## 2023-04-10 PROCEDURE — 97530 THERAPEUTIC ACTIVITIES: CPT | Mod: GP | Performed by: PHYSICAL THERAPIST

## 2023-04-17 ENCOUNTER — THERAPY VISIT (OUTPATIENT)
Dept: PHYSICAL THERAPY | Facility: CLINIC | Age: 58
End: 2023-04-17
Payer: OTHER MISCELLANEOUS

## 2023-04-17 DIAGNOSIS — R26.89 ANTALGIC GAIT: ICD-10-CM

## 2023-04-17 DIAGNOSIS — M25.562 LEFT KNEE PAIN, UNSPECIFIED CHRONICITY: ICD-10-CM

## 2023-04-17 DIAGNOSIS — Z98.890 S/P LATERAL MENISCECTOMY OF LEFT KNEE: Primary | ICD-10-CM

## 2023-04-17 PROCEDURE — 97110 THERAPEUTIC EXERCISES: CPT | Mod: GP | Performed by: PHYSICAL THERAPIST

## 2023-04-17 PROCEDURE — 97112 NEUROMUSCULAR REEDUCATION: CPT | Mod: GP | Performed by: PHYSICAL THERAPIST

## 2023-04-17 PROCEDURE — 97530 THERAPEUTIC ACTIVITIES: CPT | Mod: GP | Performed by: PHYSICAL THERAPIST

## 2023-04-24 ENCOUNTER — THERAPY VISIT (OUTPATIENT)
Dept: PHYSICAL THERAPY | Facility: CLINIC | Age: 58
End: 2023-04-24
Payer: OTHER MISCELLANEOUS

## 2023-04-24 DIAGNOSIS — Z98.890 S/P LATERAL MENISCECTOMY OF LEFT KNEE: Primary | ICD-10-CM

## 2023-04-24 DIAGNOSIS — M25.562 LEFT KNEE PAIN, UNSPECIFIED CHRONICITY: ICD-10-CM

## 2023-04-24 DIAGNOSIS — R26.89 ANTALGIC GAIT: ICD-10-CM

## 2023-04-24 PROCEDURE — 97530 THERAPEUTIC ACTIVITIES: CPT | Mod: GP | Performed by: PHYSICAL THERAPIST

## 2023-04-24 PROCEDURE — 97112 NEUROMUSCULAR REEDUCATION: CPT | Mod: GP | Performed by: PHYSICAL THERAPIST

## 2023-04-24 PROCEDURE — 97110 THERAPEUTIC EXERCISES: CPT | Mod: GP | Performed by: PHYSICAL THERAPIST

## 2023-04-24 ASSESSMENT — ACTIVITIES OF DAILY LIVING (ADL)
KNEEL ON THE FRONT OF YOUR KNEE: ACTIVITY IS FAIRLY DIFFICULT
STAND: ACTIVITY IS MINIMALLY DIFFICULT
GIVING WAY, BUCKLING OR SHIFTING OF KNEE: THE SYMPTOM AFFECTS MY ACTIVITY SLIGHTLY
HOW_WOULD_YOU_RATE_THE_OVERALL_FUNCTION_OF_YOUR_KNEE_DURING_YOUR_USUAL_DAILY_ACTIVITIES?: NEARLY NORMAL
RISE FROM A CHAIR: ACTIVITY IS MINIMALLY DIFFICULT
GO DOWN STAIRS: ACTIVITY IS SOMEWHAT DIFFICULT
PAIN: THE SYMPTOM AFFECTS MY ACTIVITY SLIGHTLY
SIT WITH YOUR KNEE BENT: ACTIVITY IS MINIMALLY DIFFICULT
KNEE_ACTIVITY_OF_DAILY_LIVING_SCORE: 67.14
LIMPING: THE SYMPTOM AFFECTS MY ACTIVITY SLIGHTLY
RAW_SCORE: 47
SWELLING: THE SYMPTOM AFFECTS MY ACTIVITY SLIGHTLY
WEAKNESS: THE SYMPTOM AFFECTS MY ACTIVITY SLIGHTLY
SQUAT: ACTIVITY IS MINIMALLY DIFFICULT
HOW_WOULD_YOU_RATE_THE_CURRENT_FUNCTION_OF_YOUR_KNEE_DURING_YOUR_USUAL_DAILY_ACTIVITIES_ON_A_SCALE_FROM_0_TO_100_WITH_100_BEING_YOUR_LEVEL_OF_KNEE_FUNCTION_PRIOR_TO_YOUR_INJURY_AND_0_BEING_THE_INABILITY_TO_PERFORM_ANY_OF_YOUR_USUAL_DAILY_ACTIVITIES?: 90
STIFFNESS: THE SYMPTOM AFFECTS MY ACTIVITY SLIGHTLY
GO UP STAIRS: ACTIVITY IS MINIMALLY DIFFICULT
WALK: ACTIVITY IS MINIMALLY DIFFICULT
KNEE_ACTIVITY_OF_DAILY_LIVING_SUM: 47
AS_A_RESULT_OF_YOUR_KNEE_INJURY,_HOW_WOULD_YOU_RATE_YOUR_CURRENT_LEVEL_OF_DAILY_ACTIVITY?: NORMAL

## 2023-05-08 ENCOUNTER — THERAPY VISIT (OUTPATIENT)
Dept: PHYSICAL THERAPY | Facility: CLINIC | Age: 58
End: 2023-05-08
Payer: OTHER MISCELLANEOUS

## 2023-05-08 DIAGNOSIS — Z98.890 S/P LATERAL MENISCECTOMY OF LEFT KNEE: Primary | ICD-10-CM

## 2023-05-08 DIAGNOSIS — M25.562 LEFT KNEE PAIN, UNSPECIFIED CHRONICITY: ICD-10-CM

## 2023-05-08 DIAGNOSIS — R26.89 ANTALGIC GAIT: ICD-10-CM

## 2023-05-08 PROCEDURE — 97112 NEUROMUSCULAR REEDUCATION: CPT | Mod: GP | Performed by: PHYSICAL THERAPIST

## 2023-05-08 PROCEDURE — 97110 THERAPEUTIC EXERCISES: CPT | Mod: GP | Performed by: PHYSICAL THERAPIST

## 2023-05-08 PROCEDURE — 97530 THERAPEUTIC ACTIVITIES: CPT | Mod: GP | Performed by: PHYSICAL THERAPIST

## 2023-05-08 NOTE — PROGRESS NOTES
Subjective:  HPI  Physical Exam                    Objective:  System    Physical Exam    General     ROS    Assessment/Plan:    PROGRESS  REPORT    Progress reporting period is from 3/27/23 to 5/8/23.       SUBJECTIVE  Subjective changes noted by patient:  Bob reports that he has returned to work 8 hour days with mod. diifficulty kneeling. Wearing patellar brace 75% of time at work.    Current pain level is 0/10  .     Initial Pain level: 2/10.   Changes in function:  Yes (See Goal flowsheet attached for changes in current functional level)  Adverse reaction to treatment or activity: None    OBJECTIVE  Changes noted in objective findings:  PROM L Knee = 0-0-120 degrees. Ambulates in normal gait on level surfaces and stairs.     ASSESSMENT/PLAN  Updated problem list and treatment plan: Diagnosis 1:  S/P L Knee meniscectomy  Pain -  manual therapy, splint/taping/bracing/orthotics, self management, education and home program  Decreased ROM/flexibility - manual therapy, therapeutic exercise and home program  Decreased strength - therapeutic exercise, therapeutic activities and home program  Impaired gait - gait training and home program  Impaired muscle performance - neuro re-education and home program  Decreased function - therapeutic activities and home program  STG/LTGs have been met or progress has been made towards goals:  Yes (See Goal flow sheet completed today.)  Assessment of Progress: The patient's condition is improving.  Patient is meeting short term goals and is progressing towards long term goals.  Self Management Plans:  Patient is independent in a home treatment program.  Patient is independent in self management of symptoms.  I have re-evaluated this patient and find that the nature, scope, duration and intensity of the therapy is appropriate for the medical condition of the patient.  Trung continues to require the following intervention to meet STG and LTG's:  PT    Recommendations:  This patient  would benefit from continued therapy.     Frequency:  1 X a month, once daily  Duration:  for 1-2 months if needed.        Please refer to the daily flowsheet for treatment today, total treatment time and time spent performing 1:1 timed codes.

## 2023-05-18 ENCOUNTER — OFFICE VISIT (OUTPATIENT)
Dept: ORTHOPEDICS | Facility: CLINIC | Age: 58
End: 2023-05-18
Payer: COMMERCIAL

## 2023-05-18 ENCOUNTER — TELEPHONE (OUTPATIENT)
Dept: ORTHOPEDICS | Facility: CLINIC | Age: 58
End: 2023-05-18

## 2023-05-18 DIAGNOSIS — G89.29 CHRONIC PAIN OF LEFT KNEE: Primary | ICD-10-CM

## 2023-05-18 DIAGNOSIS — M25.562 CHRONIC PAIN OF LEFT KNEE: Primary | ICD-10-CM

## 2023-05-18 PROCEDURE — 99024 POSTOP FOLLOW-UP VISIT: CPT | Performed by: ORTHOPAEDIC SURGERY

## 2023-05-18 NOTE — LETTER
5/18/2023         RE: Trung Dubois  [address] [address] [address]        Dear Colleague,    Thank you for referring your patient, Trung Dubois, to the [facility]. Please see a copy of my visit note below.    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  SANE score of 80 on the left versus 95 on the right.  Has returned to work.  Without restrictions.  Some difficulty kneeling.  Knee does get sore when he does a lot of stair climbing and more involved activities    EXAM:     General: Awake, Alert, and oriented. Articulates and communicates with a normal affect     Left lower Extremity:  Incisions well healed without evidence of infection  No post-operative effusion or ecchymosis  Range of motion is full   Knee is stable to examination  Neurovascularly intact    IMAGING:  No new imaging    ASSESSMENT:  6 weeks status post arthroscopic meniscectomy and chondroplasty left knee    PLAN:   Weightbearing as tolerated  Range of motion as tolerated  No specific restrictions  If the patient does well then no further intervention is necessary  The patient does have areas of chondrosis particularly grade 4 area of the central trochlea, grade 3 area of the lateral tibial plateau and grade 3 area of the medial femoral condyle and it is conceivable these areas could cause him symptoms in the future.  If that is the case we could consider intervention such as injection management, oral anti-inflammatories or physical therapy.  I think at some level it is possible that his knee will continue to bother him going forward we may need to participate in these interventions or activity modification.  If he should have ongoing symptoms I may need  to refer him to one of my arthroplasty partners if his symptoms should persist.        Again, thank you for allowing me to participate in the care of your patient.        Sincerely,        Daniel Rios MD

## 2023-05-18 NOTE — TELEPHONE ENCOUNTER
Patient has appt with Dr. Rios today and is asking if he could arrive earlier for his appt and be done a little earlier so he could return to work.

## 2023-05-18 NOTE — NURSING NOTE
Reason For Visit:   Chief Complaint   Patient presents with     Surgical Followup     6 weeks s/p left meniscectomy       ?  No  Occupation truck drive.  Currently working? Yes.  Work status?  Full time.  Date of injury: years ago knee spur  Type of injury: fall.  Date of surgery: NA  Type of surgery: NA.  Smoker: No  Request smoking cessation information: No    Sane Score  Left knee - Affected  Left Knee- 80  Right Knee- 95    Questions/concerns: can't kneel or wash foot, did a lot of climbing Monday and Tuesday and had pain and had to use ice      Keli Qureshi, EMT

## 2023-05-18 NOTE — PROGRESS NOTES
DIAGNOSIS:   1. Left knee lateral meniscus tear  2. 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 Arthroscopic chondroplasty meniscectomy; date of surgery 3/23/2023    HISTORY:  Overall doing well.  SANE score of 80 on the left versus 95 on the right.  Has returned to work.  Without restrictions.  Some difficulty kneeling.  Knee does get sore when he does a lot of stair climbing and more involved activities    EXAM:     General: Awake, Alert, and oriented. Articulates and communicates with a normal affect     Left lower Extremity:    Incisions well healed without evidence of infection    No post-operative effusion or ecchymosis    Range of motion is full     Knee is stable to examination    Neurovascularly intact    IMAGING:  No new imaging    ASSESSMENT:  1. 6 weeks status post arthroscopic meniscectomy and chondroplasty left knee    PLAN:   Weightbearing as tolerated  Range of motion as tolerated  No specific restrictions  If the patient does well then no further intervention is necessary  The patient does have areas of chondrosis particularly grade 4 area of the central trochlea, grade 3 area of the lateral tibial plateau and grade 3 area of the medial femoral condyle and it is conceivable these areas could cause him symptoms in the future.  If that is the case we could consider intervention such as injection management, oral anti-inflammatories or physical therapy.  I think at some level it is possible that his knee will continue to bother him going forward we may need to participate in these interventions or activity modification.  If he should have ongoing symptoms I may need to refer him to one of my arthroplasty partners if his symptoms should persist.

## 2023-05-30 ENCOUNTER — TELEPHONE (OUTPATIENT)
Dept: FAMILY MEDICINE | Facility: CLINIC | Age: 58
End: 2023-05-30
Payer: COMMERCIAL

## 2023-05-30 DIAGNOSIS — F33.2 SEVERE EPISODE OF RECURRENT MAJOR DEPRESSIVE DISORDER, WITHOUT PSYCHOTIC FEATURES (H): ICD-10-CM

## 2023-05-30 DIAGNOSIS — F39 MOOD DISORDER (H): Primary | ICD-10-CM
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
## 2023-05-30 NOTE — TELEPHONE ENCOUNTER
Order/Referral Request    Who is requesting: Patient.      Orders being requested: Patient would like a referral to a sex therapist.  Also, was suicidal a week ago.      Reason service is needed/diagnosis: Sex therapy.      When are orders needed by: ASAP.  Patient called a counselor and their apptointments were far out.      Has this been discussed with Provider: No    Does patient have a preference on a Group/Provider/Facility?  No    Does patient have an appointment scheduled?: No    Could we send this information to you in [employer] or would you prefer to receive a phone call?:   Patient would prefer a phone call   Okay to leave a detailed message?: Yes at Cell number on file:    Telephone Information:   Mobile [phone]

## 2023-06-06 ENCOUNTER — MYC MEDICAL ADVICE (OUTPATIENT)
Dept: FAMILY MEDICINE | Facility: CLINIC | Age: 58
End: 2023-06-06

## 2023-06-06 ENCOUNTER — TELEPHONE (OUTPATIENT)
Dept: FAMILY MEDICINE | Facility: CLINIC | Age: 58
End: 2023-06-06
Payer: COMMERCIAL

## 2023-06-06 NOTE — TELEPHONE ENCOUNTER
Forms/Letter Request    Type of form/letter: WORK COMP    Have you been seen for this request: Yes      Do we have the form/letter: Yes:      Who is the form from? Insurance comp [employer]    Where did/will the form come from? form was faxed in    When is form/letter needed by: as soon as time permits    How would you like the form/letter returned: Fax : to:  [phone]    Patient Notified form requests are processed in 3-5 business days:No    Could we send this information to you in [employer] or would you prefer to receive a phone call?:   Patient would like to be contacted via [employer]

## 2023-06-06 NOTE — LETTER
June 8, 2023      Trung Dubois  [address] [address] [place] [id number]              Peewee Rojas,      We have received a work comp form for you but will need an appointment in order to possibly fill this out after an exam.      If you have seen a different provider for this injury already, please let us know and we can forward the paperwork to them.     [employer]

## 2023-06-26 ENCOUNTER — TELEPHONE (OUTPATIENT)
Dept: FAMILY MEDICINE | Facility: CLINIC | Age: 58
End: 2023-06-26
Payer: COMMERCIAL

## 2023-06-26 DIAGNOSIS — L23.7 CONTACT DERMATITIS DUE TO POISON IVY: ICD-10-CM

## 2023-06-26 RX ORDER — TRIAMCINOLONE ACETONIDE 5 MG/G
CREAM TOPICAL
Qty: 30 G | Refills: 0 | Status: SHIPPED | OUTPATIENT
Start: 2023-06-26 | End: 2023-10-31

## 2023-06-26 NOTE — TELEPHONE ENCOUNTER
Call patient this is filled.  Notify him he will need a visit with next fill as this is our protocol.  If any concern for secondary infection if not improving in the next 2 to 3 days needs follow-up.  Patient needs follow-up blood pressure by JONATHAN Salazar DNP

## 2023-06-26 NOTE — TELEPHONE ENCOUNTER
Patient informed.  No further questions or concerns at this time.    Karri Bravo, RYANN, RN, PHN  [facility] ~ Registered Nurse  Clinic Triage ~ [place] [facility]  June 26, 2023

## 2023-06-26 NOTE — TELEPHONE ENCOUNTER
"Patient calling with symptoms of poison ivy on his bilateral ankles.   Patient states he was prescribed some cream a few years ago and is asking for this again.     Informed patient that some type of visit is needed in order for treatment to be prescribed. Patient states he was prescribed this previously without an appointment. He says he is \"thousands of dollars in medical debt\" and cannot afford another visit.  Patient did had office appointment for this on 6/29/18 and refilled on 7/3/18.     Blisters are located on bilateral ankles. He has scratched the area overnight while sleeping and has scabs on his ankles.   Have been present for 4-5 days now.   He has been using home remedy of magnesium water and spraying on the area. This provides short duration of pain relief, 30 min to 1 hour.    Pharmacy - [facility]    Adam to leave detailed message if patient does not answer upon call back.     Fide JIMENEZ, RN  [facility]    "

## 2023-06-27 ENCOUNTER — DOCUMENTATION ONLY (OUTPATIENT)
Dept: ORTHOPEDICS | Facility: CLINIC | Age: 58
End: 2023-06-27
Payer: COMMERCIAL

## 2023-06-27 NOTE — PROGRESS NOTES
Forms Received:   Company:   Status: Filled out, waiting for provider signature    Sylvia Kelly on 6/27/2023 at 10:51 AM

## 2023-07-10 ENCOUNTER — NURSE TRIAGE (OUTPATIENT)
Dept: NURSING | Facility: CLINIC | Age: 58
End: 2023-07-10
Payer: COMMERCIAL

## 2023-07-10 NOTE — TELEPHONE ENCOUNTER
Hugo Cortez from [employer]  is requesting a copy of the letter given to patient on 6/6/23 please fax form to [phone]
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
## 2023-07-11 NOTE — TELEPHONE ENCOUNTER
RN Triage    Patient Contact    Attempt # 1    Was call answered?  No.  Left message on voicemail with information to call me back.    Can double book 2pm or 3 pm slot for today.   DAWOOD Cody CNP, BSN, RN, PHN  [place]  July 11, 2023

## 2023-07-11 NOTE — TELEPHONE ENCOUNTER
Left message for patient to return call.  Please help schedule per below.    Karri Bravo, RYANN, RN, PHN  [facility] ~ Registered Nurse  Clinic Triage ~ [place] [facility]  July 11, 2023

## 2023-07-12 NOTE — TELEPHONE ENCOUNTER
RN Triage    Patient Contact    Attempt # 3    Was call answered?  No.  Left message on voicemail with information to call me back.    Third attempt to contact patient. Patient has not returned calls. Routing to PCP for advisement on next steps.     Urgent Care advised at this time? Or are you able to work patient in a tomorrow if call is returned?        TONY Shearer, RN  [facility] ~ Registered Nurse  Clinic Triage ~ [place] [facility] [place]  July 12, 2023

## 2023-07-12 NOTE — TELEPHONE ENCOUNTER
"Patient returned call. Patient reports he tried Robitussin and his wife has been helping him apply a home remedy of onions on his feet and those have been helping.    RN advised due to duration of cough in person assessment is recommended. RN advised due to no appointments in clinic UC recommended. Patient reports, \"I am going to let it ride out and see how it goes for a couple of days.\" RN advised assessment is recommended due to cough duration; patient declined UC recommendation- patient will notify clinic with new, worsening, or symptoms not improving with home remedies. RN advised to seek care at UC if clinic not open and new or worsening symptoms present or patient not improving. Patient verbalized understanding and all questions answered.     TONY Shearer, RN  [facility] ~ Registered Nurse  Clinic Triage ~ [place] [facility] [place]  July 12, 2023   "

## 2023-07-26 ENCOUNTER — DOCUMENTATION ONLY (OUTPATIENT)
Dept: ORTHOPEDICS | Facility: CLINIC | Age: 58
End: 2023-07-26
Payer: COMMERCIAL

## 2023-07-26 NOTE — PROGRESS NOTES
Received Completed forms Yes   Faxed Forms Faxed To: accident fund  Fax Number: [phone]   Sent to HIM (Date) 7/24/23        Explanation of exam/test

## 2023-07-27 ENCOUNTER — TELEPHONE (OUTPATIENT)
Dept: ORTHOPEDICS | Facility: CLINIC | Age: 58
End: 2023-07-27
Payer: COMMERCIAL

## 2023-07-27 DIAGNOSIS — M25.562 LEFT KNEE PAIN, UNSPECIFIED CHRONICITY: Primary | ICD-10-CM

## 2023-07-27 NOTE — TELEPHONE ENCOUNTER
Called and talked with patient.  He received a call from his work comp to let him know he has a 2% disability. He was wondering if this percentage changes as he becomes less able to work and his pain increases. We discussed the [place] statute book that tells us what the percentages of disability are and it is something that does not change unless his diagnosis changes. The percentage is also not based on his arthritis, but the surgery we completed.   He is interested in a referral for a total joint surgeon. Referral placed.   Noris Birmingham RN

## 2023-07-27 NOTE — TELEPHONE ENCOUNTER
[facility] Call Center    Phone Message    May a detailed message be left on voicemail: yes     Reason for Call: Question about Knee Surgery. Need to Verify Some Things. Please call     Action Taken: Message routed to:  [facility] ([facility]): ronny    Travel Screening: Not Applicable

## 2023-08-01 ENCOUNTER — TELEPHONE (OUTPATIENT)
Dept: ORTHOPEDICS | Facility: CLINIC | Age: 58
End: 2023-08-01
Payer: COMMERCIAL

## 2023-08-01 NOTE — TELEPHONE ENCOUNTER
Patient Returning Call    Reason for call:  Patient calling requesting a finalized letter for work comp that states patient was seen and all care has been finished     Information relayed to patient:  te sent to clinic     Patient has additional questions:  No      Could we send this information to you in [employer] or would you prefer to receive a phone call?:   Patient would prefer a phone call   Okay to leave a detailed message?: Yes at Cell number on file:    Telephone Information:   Mobile [phone]

## 2023-08-02 NOTE — TELEPHONE ENCOUNTER
Called and informed Pt that a return to work without restrictions letter was sent to their mychart. Pt thanked me for the call and had no further questions.    Douglas BERGERON ATC

## 2023-08-28 NOTE — TELEPHONE ENCOUNTER
Left detailed message informing patient.   [facility]: [facility] [phone]    Ilumya Counseling: I discussed with the patient the risks of tildrakizumab including but not limited to immunosuppression, malignancy, posterior leukoencephalopathy syndrome, and serious infections.  The patient understands that monitoring is required including a PPD at baseline and must alert us or the primary physician if symptoms of infection or other concerning signs are noted.

## 2023-09-06 ENCOUNTER — OFFICE VISIT (OUTPATIENT)
Dept: FAMILY MEDICINE | Facility: CLINIC | Age: 58
End: 2023-09-06
Payer: COMMERCIAL

## 2023-09-06 ENCOUNTER — ANCILLARY PROCEDURE (OUTPATIENT)
Dept: GENERAL RADIOLOGY | Facility: CLINIC | Age: 58
End: 2023-09-06
Attending: PHYSICIAN ASSISTANT
Payer: COMMERCIAL

## 2023-09-06 VITALS
BODY MASS INDEX: 35.39 KG/M2 | DIASTOLIC BLOOD PRESSURE: 80 MMHG | OXYGEN SATURATION: 98 % | SYSTOLIC BLOOD PRESSURE: 136 MMHG | HEIGHT: 73 IN | WEIGHT: 267 LBS | TEMPERATURE: 98.1 F | HEART RATE: 64 BPM

## 2023-09-06 DIAGNOSIS — M25.531 RIGHT WRIST PAIN: Primary | ICD-10-CM

## 2023-09-06 DIAGNOSIS — M25.531 RIGHT WRIST PAIN: ICD-10-CM

## 2023-09-06 DIAGNOSIS — M25.561 ACUTE PAIN OF RIGHT KNEE: ICD-10-CM

## 2023-09-06 PROCEDURE — 73110 X-RAY EXAM OF WRIST: CPT | Mod: TC | Performed by: RADIOLOGY

## 2023-09-06 PROCEDURE — 73562 X-RAY EXAM OF KNEE 3: CPT | Mod: TC | Performed by: RADIOLOGY

## 2023-09-06 PROCEDURE — 99214 OFFICE O/P EST MOD 30 MIN: CPT | Performed by: PHYSICIAN ASSISTANT

## 2023-09-06 NOTE — PROGRESS NOTES
"  Assessment & Plan     Right wrist pain  Normal exam. Minimally tender with deep palpation palm thenar eminence.   Non anatomic snuffbox pain/tenderness. Xrays done 7 days out form injury and normal scaphoid.   Has been working and using hand/wrist normally.  Anticipate continued improvement to resolution with rest, ice, nsaids as needed.    - XR Wrist Right G/E 3 Views; Future    Acute pain of right knee  Normal exam- no effusion, swelling, normal ROM, neg meniscal testing, normal ligament testing.   Xrays - no acute fracture or dislocation.   Discussed the small loose bodys on xray- not likely acute. No locking or catching sx.   Anticipated continued improvement with symptomatic measures- as above.  - XR Knee Right 3 Views; Future       BMI:   Estimated body mass index is 35.23 kg/m  as calculated from the following:    Height as of this encounter: 1.854 m (6' 1\").    Weight as of this encounter: 121.1 kg (267 lb).   Weight management plan: Patient was referred to their PCP to discuss a diet and exercise plan.    Follow Up: see above. Additionally patient was instructed to contact clinic for worsening symptoms, non-improvement in time frame discussed, and for questions regarding treatment plan.   For virtual visits, the patient was advised to be seen for in person evaluation if symptoms or condition are worsening or non-improvement as expected.       Liseth Mckeon PA-C  [facility] Rojas is a 58 year old, presenting for the following health issues:  Work Comp      9/6/2023     3:19 PM   Additional Questions   Roomed by Gill MORFIN CMA   Accompanied by self         9/6/2023     3:19 PM   Patient Reported Additional Medications   Patient reports taking the following new medications multivit       HPI     Concern - Work Comp Injury 08/31/23- fall onto right hand and knee   Onset: DOI 08/31/23  Description: Fall on 08/31/23, fell on right knee, used his palm to catch his fall " "right hand injury     Tripped over hoses or the machine I was working on. Was on gravel parking lot. Landed on palm of right hand and on right knee from standing height.  He was wearing pants and work boots. No abrasions on hand or knee. No bruising. No swelling of hand/wrist or knee at time of injury. This was at the end of his work day. He finished his work shift. Returned to work next day and worked weekend at his 2nd job as .   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   Treated with ice intermittently and with ibuprofen.   Right hand dominant.   No prior right knee surgeries/procedures.     Right hand/wrist: \"not really any pain now\". Over weekend was painful indicates thenar eminence or palm. \"Little bit pain\" with grasping/gripping or pushing on it. Not limiting tasks at work. No swelling, bruising or abrasions     Right knee: pain bottom edge of knee cap. Pain with pushing on it. Little tender with kneeling. No bruising.   No pain with normal walking. Able to straighten and bend normally. No locking, catching or giving out.        Intensity: mild  Progression of Symptoms:  same, constant, and intermittent  Accompanying Signs & Symptoms: as above   Previous history of similar problem: no   Precipitating factors:        Worsened by: use   Alleviating factors:        Improved by: limit use   Therapies tried and outcome: ice, ibu and  heating pad and donahue oil         Review of Systems   Constitutional, HEENT, cardiovascular, pulmonary, gi and gu systems are negative, except as otherwise noted.      Objective    /80   Pulse 64   Temp 98.1  F (36.7  C) (Temporal)   Ht 1.854 m (6' 1\")   Wt 121.1 kg (267 lb)   SpO2 98%   BMI 35.23 kg/m    Body mass index is 35.23 kg/m .  Physical Exam   GENERAL: healthy, alert and no distress  MS:   RIGHT Hand/Wrist: no swelling, redness, bruising, abrasion or skin changes. Minimally tender over palm thenar eminence with deep palpation.  " 5/5.  ROM normal.  5/5, sensation normal and symmetric, radial pulses normal  Nontender over MCPs, carpal bones, anatomic snuffbox, or CMC joint.   RIGHT knee: no skin changes, abrasions, or bruising. Symmetric. No effusion or swelling. Mildly tender to palpation below the patella anteriorly. Mildly tender at medial joint line. Patella nontender to exam. No popliteal fullness or tenderness. ROM full extension, flexion past 90 degrees. No laxity with ligament testing. Meniscal testing negative. No calf pain or tenderness. Posterior tibial pulses normal, no edema, sensation intact. Gait normal. Gait is normal. Can squat without limitations, pain. Demonstrates kneeling.   SKIN: no suspicious lesions or rashes    Xray-   Results for orders placed or performed in visit on 09/06/23   XR Wrist Right G/E 3 Views     Status: None    Narrative    XR WRIST RIGHT G/E 3 VIEWS   9/6/2023 3:58 PM     HISTORY: fall onto palm of hand. pain thenar eminence; Right wrist  pain  COMPARISON: None.       Impression    IMPRESSION: No acute fracture or dislocation. There is mild  carpometacarpal degenerative arthrosis. A small well-defined lucent  lesion in the distal aspect of the scaphoid has a nonaggressive  appearance, likely a cyst or intraosseous ganglion.    DUSTIN GARCIA MD         SYSTEM ID:  [id number]   Results for orders placed or performed in visit on 09/06/23   XR Knee Right 3 Views     Status: None    Narrative    XR KNEE RIGHT 3 VIEWS 9/6/2023 3:58 PM    HISTORY: fall onto knee, pain anterior below patella; Acute pain of  right knee    COMPARISON: None.      Impression    IMPRESSION: No fracture or effusion. No degenerative changes. Tiny  loose body in the posterior portion of the knee.    LIBIA DARNELL MD         SYSTEM ID:  [id number]

## 2023-10-27 ENCOUNTER — TELEPHONE (OUTPATIENT)
Dept: FAMILY MEDICINE | Facility: CLINIC | Age: 58
End: 2023-10-27
Payer: COMMERCIAL

## 2023-10-27 NOTE — TELEPHONE ENCOUNTER
Patient is scheduled for physical with Katelynn Camacho. Patient had a physical done on 1/30/23 and not due until 1/30/24. Spoke with patient on the phone and advised patient of this. He states he has other concerns he would like to discuss with provider, but if he's here, he would like to do a physical as well as it is hard to get in for a physical. Advised patient to call his insurance company to check to see if he can have 2 physicals in the same year. Patient asked if he could also get labs done and if these labs would be covered under his insurance during a physical. Advised him to contact his insurance company to check what his plan covers. Patient verbalized understanding. Will keep appointment, changed visit from preventative adult to office visit.

## 2023-10-31 ENCOUNTER — OFFICE VISIT (OUTPATIENT)
Dept: FAMILY MEDICINE | Facility: CLINIC | Age: 58
End: 2023-10-31
Payer: COMMERCIAL

## 2023-10-31 VITALS
WEIGHT: 269.6 LBS | SYSTOLIC BLOOD PRESSURE: 128 MMHG | BODY MASS INDEX: 35.73 KG/M2 | HEART RATE: 63 BPM | RESPIRATION RATE: 13 BRPM | DIASTOLIC BLOOD PRESSURE: 82 MMHG | HEIGHT: 73 IN | TEMPERATURE: 98.5 F | OXYGEN SATURATION: 99 %

## 2023-10-31 DIAGNOSIS — M25.521 RIGHT ELBOW PAIN: ICD-10-CM

## 2023-10-31 DIAGNOSIS — F33.2 SEVERE EPISODE OF RECURRENT MAJOR DEPRESSIVE DISORDER, WITHOUT PSYCHOTIC FEATURES (H): ICD-10-CM

## 2023-10-31 DIAGNOSIS — R35.0 URINARY FREQUENCY: ICD-10-CM

## 2023-10-31 DIAGNOSIS — S99.922A TOE INJURY, LEFT, INITIAL ENCOUNTER: ICD-10-CM

## 2023-10-31 DIAGNOSIS — R73.03 PREDIABETES: ICD-10-CM

## 2023-10-31 DIAGNOSIS — E78.5 HYPERLIPIDEMIA LDL GOAL <130: ICD-10-CM

## 2023-10-31 DIAGNOSIS — F41.1 GENERALIZED ANXIETY DISORDER: ICD-10-CM

## 2023-10-31 DIAGNOSIS — E66.01 MORBID OBESITY (H): ICD-10-CM

## 2023-10-31 DIAGNOSIS — Z00.00 ROUTINE GENERAL MEDICAL EXAMINATION AT A HEALTH CARE FACILITY: Primary | ICD-10-CM

## 2023-10-31 LAB
ALBUMIN SERPL BCG-MCNC: 3.9 G/DL (ref 3.5–5.2)
ALP SERPL-CCNC: 82 U/L (ref 40–129)
ALT SERPL W P-5'-P-CCNC: 17 U/L (ref 0–70)
ANION GAP SERPL CALCULATED.3IONS-SCNC: 8 MMOL/L (ref 7–15)
AST SERPL W P-5'-P-CCNC: 19 U/L (ref 0–45)
BILIRUB SERPL-MCNC: 0.4 MG/DL
BUN SERPL-MCNC: 18.5 MG/DL (ref 6–20)
CALCIUM SERPL-MCNC: 8.8 MG/DL (ref 8.6–10)
CHLORIDE SERPL-SCNC: 105 MMOL/L (ref 98–107)
CHOLEST SERPL-MCNC: 192 MG/DL
CREAT SERPL-MCNC: 0.92 MG/DL (ref 0.67–1.17)
DEPRECATED HCO3 PLAS-SCNC: 25 MMOL/L (ref 22–29)
EGFRCR SERPLBLD CKD-EPI 2021: >90 ML/MIN/1.73M2
GLUCOSE SERPL-MCNC: 106 MG/DL (ref 70–99)
HBA1C MFR BLD: 6 % (ref 0–5.6)
HDLC SERPL-MCNC: 42 MG/DL
LDLC SERPL CALC-MCNC: 131 MG/DL
NONHDLC SERPL-MCNC: 150 MG/DL
POTASSIUM SERPL-SCNC: 4.4 MMOL/L (ref 3.4–5.3)
PROT SERPL-MCNC: 6.8 G/DL (ref 6.4–8.3)
SODIUM SERPL-SCNC: 138 MMOL/L (ref 135–145)
TRIGL SERPL-MCNC: 95 MG/DL

## 2023-10-31 PROCEDURE — 83036 HEMOGLOBIN GLYCOSYLATED A1C: CPT | Performed by: PHYSICIAN ASSISTANT

## 2023-10-31 PROCEDURE — 99214 OFFICE O/P EST MOD 30 MIN: CPT | Mod: 25 | Performed by: PHYSICIAN ASSISTANT

## 2023-10-31 PROCEDURE — 36415 COLL VENOUS BLD VENIPUNCTURE: CPT | Performed by: PHYSICIAN ASSISTANT

## 2023-10-31 PROCEDURE — 80061 LIPID PANEL: CPT | Performed by: PHYSICIAN ASSISTANT

## 2023-10-31 PROCEDURE — 80053 COMPREHEN METABOLIC PANEL: CPT | Performed by: PHYSICIAN ASSISTANT

## 2023-10-31 PROCEDURE — 99396 PREV VISIT EST AGE 40-64: CPT | Performed by: PHYSICIAN ASSISTANT

## 2023-10-31 RX ORDER — METAPROTERENOL SULFATE 10 MG
500 TABLET ORAL DAILY
COMMUNITY
Start: 2023-10-31

## 2023-10-31 ASSESSMENT — ENCOUNTER SYMPTOMS
CONSTIPATION: 0
DYSURIA: 0
HEMATOCHEZIA: 0
FREQUENCY: 0
HEARTBURN: 0
JOINT SWELLING: 0
DIARRHEA: 0
SORE THROAT: 1
CHILLS: 0
NAUSEA: 0
HEMATURIA: 0
FEVER: 0
DIZZINESS: 0
COUGH: 1
WEAKNESS: 0
ABDOMINAL PAIN: 0
ARTHRALGIAS: 0
NERVOUS/ANXIOUS: 0
MYALGIAS: 0
EYE PAIN: 0
PALPITATIONS: 0
SHORTNESS OF BREATH: 0
PARESTHESIAS: 0
HEADACHES: 0

## 2023-10-31 ASSESSMENT — PAIN SCALES - GENERAL: PAINLEVEL: NO PAIN (0)

## 2023-10-31 NOTE — PROGRESS NOTES
{PROVIDER CHARTING PREFERENCE:890878}    Subjective   Bob is a 58 year old, presenting for the following health issues:  No chief complaint on file.        10/31/2023     7:20 AM   Additional Questions   Roomed by Amy HARRIS   Accompanied by None         10/31/2023     7:20 AM   Patient Reported Additional Medications   Patient reports taking the following new medications NA       HPI     {MA/LPN/RN Pre-Provider Visit Orders- hCG/UA/Strep (Optional):222632}  {SUPERLIST (Optional):256065}  {additonal problems for provider to add (Optional):655899}      Review of Systems   {ROS COMP (Optional):387955}      Objective    There were no vitals taken for this visit.  There is no height or weight on file to calculate BMI.  Physical Exam   {Exam List (Optional):947858}    {Diagnostic Test Results (Optional):133647}    {AMBULATORY ATTESTATION (Optional):871540}

## 2023-10-31 NOTE — PROGRESS NOTES
SUBJECTIVE:   CC: Bob is an 58 year old who presents for preventative health visit.  He is aware his last physical was in January 2023, which is less than a year ago.  I am concerned that this may not be covered by insurance.  He is sure that it will be covered by insurance and would like to have a physical completed today.      10/31/2023     7:20 AM   Additional Questions   Roomed by Amy HARRIS   Accompanied by None         10/31/2023     7:20 AM   Patient Reported Additional Medications   Patient reports taking the following new medications NA   He would like to repeat lab work.  He is concerned about prediabetes and his most recent hemoglobin A1c.  He has made some changes to his diet but still eats a lot of sandwiches and bread so he thinks that may have an effect on his A1c.  He is active all day long climbing in and out of vehicles and on his feet.    Reports left foot 5th digit is possibly dislocated. Reports occasional numbness.  He bumped his fifth toe on a steel door jam in December 2022.  It is no longer painful does appear to be  Swollen, larger than the same toe on his right foot.  It does get intermittently numb and tingly, he notices it more towards evening.  He reports a history of foot pain saying both of his arches are bad.  He does have some insoles which he wears and some boots but not in others.  He unfortunately is barefoot when he is home in the evening.  He also reports a history of frostbite, when he gets cold sometimes his fingers and his toes will get pale, he works very hard to keep his hands and feet warm he will going to warm up if this occurs.    Pt reports occasional right elbow pain when leaning on it on the center console of his vehicle.  He states his only if he compresses a certain spot with leaning over to get a drink or something.  He has no fourth or fifth finger paresthesias.  It is just a momentary discomfort and then it goes away.  He denies that the elbow gets red or  "swollen.    Also, he has pain just below his kneecap on his right knee when he is kneeling on it, no other complaints of knee pain except in this position.  Recent x-ray from September does not reveal any arthritic changes though he does have a loose body in his joint but his posterior aspect of his knee not to the anterior.    Pt reports feeling like \"bladder thaws out\" when coming in and out of cold.  By this he means sometimes he needs to urinate more frequently.  Sometimes its 5-6 times a day, this is slightly more than usual at times.  Declines pain, blood, and discharge .    Pt refused PHQ-9, no suicidal thoughts currently.  He is seeing psychiatry for his mental health he is not currently on any medications.    Pt reports that it is okay to have physical today due to \"meeting deductible\"     Healthy Habits:     Getting at least 3 servings of Calcium per day:  Yes    Bi-annual eye exam:  Yes    Dental care twice a year:  NO    Sleep apnea or symptoms of sleep apnea:  None    Diet:  Regular (no restrictions)    Frequency of exercise:  2-3 days/week    Duration of exercise:  Less than 15 minutes    Taking medications regularly:  Not Applicable    Medication side effects:  Not applicable    Additional concerns today:  Yes          Hyperlipidemia Follow-Up    Are you regularly taking any medication or supplement to lower your cholesterol?   No he was prescribed atorvastatin but never started taking it, his wife is strongly against this medication and is worried about the side effects.  He is taking a fish oil supplement and would like to see if his cholesterol has improved.  He is fasting today.  Are you having muscle aches or other side effects that you think could be caused by your cholesterol lowering medication?  No      Social History     Tobacco Use    Smoking status: Never    Smokeless tobacco: Never   Substance Use Topics    Alcohol use: Yes     Comment: 1-2 drinks per week              10/31/2023     7:31 " AM   Alcohol Use   Prescreen: >3 drinks/day or >7 drinks/week? No       Last PSA:   PSA   Date Value Ref Range Status   09/16/2020 1.69 0 - 4 ug/L Final     Comment:     Assay Method:  Chemiluminescence using Siemens Vista analyzer     Prostate Specific Antigen Screen   Date Value Ref Range Status   01/30/2023 1.78 0.00 - 4.00 ug/L Final       Reviewed orders with patient. Reviewed health maintenance and updated orders accordingly - Yes  Labs reviewed in EPIC  BP Readings from Last 3 Encounters:   10/31/23 128/82   09/06/23 136/80   03/23/23 (!) 148/100    Wt Readings from Last 3 Encounters:   10/31/23 122.3 kg (269 lb 9.6 oz)   09/06/23 121.1 kg (267 lb)   03/23/23 120.2 kg (265 lb)                  Patient Active Problem List   Diagnosis    CARDIOVASCULAR SCREENING; LDL GOAL LESS THAN 160    GERD (gastroesophageal reflux disease)    Mood disorder (H24)    Suicidal ideation    Major depression    Generalized anxiety disorder    Obesity (BMI 35.0-39.9 without comorbidity)    FAUZIA (obstructive sleep apnea)    Class 1 obesity due to excess calories without serious comorbidity in adult, unspecified BMI    Prediabetes    Lower urinary tract symptoms (LUTS)    Left knee pain, unspecified chronicity    Morbid obesity (H)    S/P lateral meniscectomy of left knee    Antalgic gait     Past Surgical History:   Procedure Laterality Date    ARTHROSCOPY KNEE WITH MEDIAL MENISCECTOMY Left 3/23/2023    Procedure: Examination under anesthesia, left knee arthroscopy, left meniscectomy;  Surgeon: Daniel Rios MD;  Location: [facility] OR    HERNIA REPAIR, UMBILICAL  07/25/2019    LASIK      left eye only    TONSILLECTOMY      AGE 5       Social History     Tobacco Use    Smoking status: Never    Smokeless tobacco: Never   Substance Use Topics    Alcohol use: Yes     Comment: 1-2 drinks per week      Family History   Problem Relation Age of Onset    Diabetes Mother     Leukemia Mother     Psychotic Disorder Father          Depression/suicide    Family History Negative Other     Depression Brother         Suicide/Bipolar    Asthma No family hx of     C.A.D. No family hx of     Hypertension No family hx of     Cerebrovascular Disease No family hx of     Breast Cancer No family hx of     Cancer - colorectal No family hx of     Prostate Cancer No family hx of     Alcohol/Drug No family hx of     Allergies No family hx of     Alzheimer Disease No family hx of     Anesthesia Reaction No family hx of     Blood Disease No family hx of     Arthritis No family hx of     Cancer No family hx of     Cardiovascular No family hx of     Circulatory No family hx of     Congenital Anomalies No family hx of     Connective Tissue Disorder No family hx of          Current Outpatient Medications   Medication Sig Dispense Refill    Omega-3 Fish Oil 500 MG capsule Take 1 capsule (500 mg) by mouth daily       Allergies   Allergen Reactions    Prevacid [Lansoprazole] Nausea       Reviewed and updated as needed this visit by clinical staff   Tobacco  Allergies  Meds              Reviewed and updated as needed this visit by Provider     Meds                 Review of Systems   Constitutional:  Negative for chills and fever.   HENT:  Positive for sore throat. Negative for congestion, ear pain and hearing loss.    Eyes:  Negative for pain and visual disturbance.   Respiratory:  Positive for cough. Negative for shortness of breath.    Cardiovascular:  Negative for chest pain, palpitations and peripheral edema.   Gastrointestinal:  Negative for abdominal pain, constipation, diarrhea, heartburn, hematochezia and nausea.   Genitourinary:  Negative for dysuria, frequency, genital sores, hematuria, impotence, penile discharge and urgency.   Musculoskeletal:  Negative for arthralgias, joint swelling and myalgias.   Skin:  Negative for rash.   Neurological:  Negative for dizziness, weakness, headaches and paresthesias.   Psychiatric/Behavioral:  Negative for mood  "changes. The patient is not nervous/anxious.      Patient did not mention these as concerns during our portion of the visit today    OBJECTIVE:   /82 (BP Location: Left arm, Patient Position: Chair, Cuff Size: Adult Large)   Pulse 63   Temp 98.5  F (36.9  C) (Temporal)   Resp 13   Ht 1.862 m (6' 1.31\")   Wt 122.3 kg (269 lb 9.6 oz)   SpO2 99%   BMI 35.27 kg/m      Physical Exam  GENERAL: healthy, alert and no distress  EYES: Eyes grossly normal to inspection, PERRL and conjunctivae and sclerae normal  HENT: ear canals and TM's normal, nose and mouth without ulcers or lesions  NECK: no adenopathy, no asymmetry, masses, or scars and thyroid normal to palpation  RESP: lungs clear to auscultation - no rales, rhonchi or wheezes  CV: regular rate and rhythm, normal S1 S2, no S3 or S4, no murmur, click or rub, no peripheral edema and peripheral pulses strong  ABDOMEN: soft, nontender, no hepatosplenomegaly, no masses and bowel sounds normal  MS: Right elbow-full range of motion no erythema or edema noted no tenderness over the medial or lateral epicondyles or over the olecranon no palpable masses  Right knee-full range of motion, no patellar tenderness but he does have tenderness over the inferior aspect of the patella where the patellar tendon attaches  Left 5th toe-normal range of motion his toe is pink and warm, it is slightly swollen in comparison to the right fifth toe.  No tenderness to palpation.  Does have slightly reduced sensation to light touch over the lateral aspect of his fifth toe  SKIN: no suspicious lesions or rashes  NEURO: Normal strength and tone, mentation intact and speech normal  PSYCH: mentation appears normal, affect normal/bright    Diagnostic Test Results:  Labs reviewed in Epic  Results for orders placed or performed in visit on 10/31/23 (from the past 24 hour(s))   Hemoglobin A1c   Result Value Ref Range    Hemoglobin A1C 6.0 (H) 0.0 - 5.6 %       ASSESSMENT/PLAN:   (Z00.00) " Routine general medical examination at a health care facility  (primary encounter diagnosis)  Comment: Discussed with patient the fact that this may not be covered as he has already had a physical this calendar year, he would like a physical anyway.  Plan: Evangelista is not due until 2025  Discussed repeating PSA at this point to keep the PSA in conjunction with his physical however since he just had this in January he would like to hold off on this test for now.     (R73.03) Prediabetes  Comment: Hemoglobin A1c has improved      Encouraged further dietary changes exercise and weight loss    (E78.5) Hyperlipidemia LDL goal <130  Comment: Not currently treated, awaiting lipids  Plan: Comprehensive metabolic panel, Lipid panel         reflex to direct LDL Fasting        Continue with fish oil we will reassess his ASCVD score though I am doubtful he will take statins based on his wife's opinion about the statins    (R35.0) Urinary frequency  Comment: Recommended a UA today which he declined  Plan:-Do a future order for UA should his symptoms persist to rule out infection    (S99.922A) Toe injury, left, initial encounter  Comment: Likely did fracture his toe in December,  Plan: He has good range of motion, good circulation and no obvious abnormality that is of concern, we discussed doing an x-ray however that would be of no clinical benefit as we would do nothing further with this.  He is satisfied with this recommendation    (M25.521) Right elbow pain  Comment: Likely related to compression of his ulnar nerve  Plan: Advised he avoid compressing the medial aspect of his elbow and to follow-up with any persisting progression of symptoms    (F33.2) Severe episode of recurrent major depressive disorder, without psychotic features (H)  Comment: Declined PHQ-9 today  Plan: Following with psychiatry    (F41.1) Generalized anxiety disorder  Comment:   Plan: Following with psychiatry    (E66.01) Morbid obesity (H)  Comment:  Weight is up a few pounds from his last visit  Plan: We will recommend dietary improvements and exercise specific to the results of his hemoglobin A1c and lipid panel    Patient has been advised of split billing requirements and indicates understanding: Yes      COUNSELING:   Reviewed preventive health counseling, as reflected in patient instructions        He reports that he has never smoked. He has never used smokeless tobacco.            Katelynn Camacho PA-C  [facility]

## 2024-01-11 ENCOUNTER — OFFICE VISIT (OUTPATIENT)
Dept: FAMILY MEDICINE | Facility: CLINIC | Age: 59
End: 2024-01-11
Payer: COMMERCIAL

## 2024-01-11 ENCOUNTER — NURSE TRIAGE (OUTPATIENT)
Dept: NURSING | Facility: CLINIC | Age: 59
End: 2024-01-11

## 2024-01-11 VITALS
TEMPERATURE: 98.4 F | OXYGEN SATURATION: 97 % | WEIGHT: 275.5 LBS | HEART RATE: 65 BPM | HEIGHT: 73 IN | SYSTOLIC BLOOD PRESSURE: 128 MMHG | BODY MASS INDEX: 36.51 KG/M2 | DIASTOLIC BLOOD PRESSURE: 80 MMHG | RESPIRATION RATE: 16 BRPM

## 2024-01-11 DIAGNOSIS — F39 MOOD DISORDER (H): ICD-10-CM

## 2024-01-11 DIAGNOSIS — F33.0 MILD EPISODE OF RECURRENT MAJOR DEPRESSIVE DISORDER (H): ICD-10-CM

## 2024-01-11 DIAGNOSIS — E66.01 MORBID OBESITY (H): ICD-10-CM

## 2024-01-11 DIAGNOSIS — H65.01 NON-RECURRENT ACUTE SEROUS OTITIS MEDIA OF RIGHT EAR: Primary | ICD-10-CM

## 2024-01-11 DIAGNOSIS — R05.2 SUBACUTE COUGH: ICD-10-CM

## 2024-01-11 PROCEDURE — 99213 OFFICE O/P EST LOW 20 MIN: CPT | Performed by: NURSE PRACTITIONER

## 2024-01-11 ASSESSMENT — PAIN SCALES - GENERAL: PAINLEVEL: MODERATE PAIN (5)

## 2024-01-11 NOTE — PROGRESS NOTES
Assessment & Plan     Non-recurrent acute serous otitis media of right ear  Return to clinic if symptoms persist or worsen. Antipyretics/analgesics prn, fluids, rest, supportive cares, prn OTC decongestants.  See orders.   - amoxicillin-clavulanate (AUGMENTIN) 875-125 MG tablet; Take 1 tablet by mouth 2 times daily for 10 days    Subacute cough  As above  - amoxicillin-clavulanate (AUGMENTIN) 875-125 MG tablet; Take 1 tablet by mouth 2 times daily for 10 days    Morbid obesity (H)  Healthy habits as able.    Mild episode of recurrent major depressive disorder (H24)  Stable, no changes, active monitoring.    Mood disorder (H24)  Stable, no changes active monitoring.                 DAWOOD Cody CNP  [facility] ARTURO Rojas is a 58 year old, presenting for the following health issues:  Cough        1/11/2024     9:05 AM   Additional Questions   Roomed by YK   Accompanied by self         1/11/2024     9:05 AM   Patient Reported Additional Medications   Patient reports taking the following new medications n/a       HPI     Patient requested that if there is something prescribed during visit to send it to [facility] - [facility]. If prescription after visit, to send to [facility] in [facility]    Acute Illness  Acute illness concerns: Runny nose, cough, fatigue  Onset/Duration: 2 weeks, started > 1 month ago, worse this past week.   Symptoms:  Fever: No  Chills/Sweats: YES- inside and outside all day, gets chilled  Headache (location?): No  Sinus Pressure: YES  Conjunctivitis:  No  Ear Pain: YES: right  Rhinorrhea: YES  Congestion: YES  Sore Throat: YES  Cough: YES-productive of yellow sputum, productive of green sputum, with shortness of breath when outside working  Wheeze: No  Decreased Appetite: No  Nausea: No  Vomiting: No  Diarrhea: YES - solid in the AM, but loose after at least once a day  Dysuria/Freq.: No  Dysuria or Hematuria: No  Fatigue/Achiness: YES  Sick/Strep  "Exposure: No  Therapies tried and outcome: Dayquil, vitamin c, cough drops      Mood: Stable no SI.  Patient is not taking medications to treat this.  Feels work is going well.      Review of Systems   Constitutional, HEENT, cardiovascular, pulmonary, gi and gu systems are negative, except as otherwise noted.      Objective    /80   Pulse 65   Temp 98.4  F (36.9  C) (Temporal)   Resp 16   Ht 1.863 m (6' 1.35\")   Wt 125 kg (275 lb 8 oz)   SpO2 97%   BMI 36.01 kg/m    Body mass index is 36.01 kg/m .  Physical Exam   GENERAL: healthy, alert, and fatigued  EYES: Eyes grossly normal to inspection, PERRL and conjunctivae and sclerae normal  HENT: normal cephalic/atraumatic, right ear: erythematous and bulging membrane, left ear: normal: no effusions, no erythema, normal landmarks, nasal mucosa edematous , oropharynx clear, oral mucous membranes moist, and tonsillar erythema  NECK: no adenopathy, no asymmetry, masses, or scars and thyroid normal to palpation  RESP: lungs clear to auscultation - no rales, rhonchi or wheezes  CV: regular rate and rhythm, normal S1 S2, no S3 or S4, no murmur, click or rub, no peripheral edema and peripheral pulses strong  MS: no gross musculoskeletal defects noted, no edema  NEURO: Normal strength and tone, mentation intact and speech normal  PSYCH: mentation appears normal, affect normal/bright                      "

## 2024-01-11 NOTE — LETTER
January 11, 2024      Trung Dubois  [address] [address] [place] [id number]        To Whom It May Concern:    Trung Dubois was seen in our clinic today. He may return to work tomorrow or Monday per his discretion. RE: ear infection and cough.      Sincerely,     Electronically signed.     Sangeetha Salazar

## 2024-01-11 NOTE — TELEPHONE ENCOUNTER
Pt is phoning with cold symptoms - runny nose, cough, fatigue for the past 2 weeks     Pt has completed a COVID test 1 1/2 weeks ago which resulted negative     Pt is coughing up green/ yellow mucous     No fever     Pt is having some shortness of breath walking around at times     No confusion or blue color on lips or face     No chest pain when not coughing     Per disposition: See HCP Within 4 Hours (Or PCP Triage)     Pt transferred to scheduling and if no appointments within the next 4 hours, pt will go to [facility] for evaluation     Care advice given per protocol and when to call back. Pt verbalized understanding and agrees to plan of care.    Caitlin Salvador RN  [place] Nurse Advisor  7:42 AM 1/11/2024      Reason for Disposition   [1] MILD difficulty breathing (e.g., minimal/no SOB at rest, SOB with walking, pulse <100) AND [2] still present when not coughing    Additional Information   Negative: SEVERE difficulty breathing (e.g., struggling for each breath, speaks in single words)   Negative: Bluish (or gray) lips or face now   Negative: [1] Difficulty breathing AND [2] exposure to flames, smoke, or fumes   Negative: [1] Stridor AND [2] difficulty breathing   Negative: Sounds like a life-threatening emergency to the triager   Negative: [1] Previous asthma attacks AND [2] this feels like asthma attack   Negative: Dry cough (non-productive;  no sputum or minimal clear sputum)   Negative: [1] MODERATE difficulty breathing (e.g., speaks in phrases, SOB even at rest, pulse 100-120) AND [2] still present when not coughing   Negative: Chest pain  (Exception: MILD central chest pain, present only when coughing.)   Negative: Patient sounds very sick or weak to the triager    Protocols used: Cough - Acute [id number]

## 2024-01-12 ASSESSMENT — PATIENT HEALTH QUESTIONNAIRE - PHQ9: SUM OF ALL RESPONSES TO PHQ QUESTIONS 1-9: 7

## 2024-10-01 ENCOUNTER — PATIENT OUTREACH (OUTPATIENT)
Dept: CARE COORDINATION | Facility: CLINIC | Age: 59
End: 2024-10-01
Payer: COMMERCIAL

## 2024-10-15 ENCOUNTER — PATIENT OUTREACH (OUTPATIENT)
Dept: CARE COORDINATION | Facility: CLINIC | Age: 59
End: 2024-10-15
Payer: COMMERCIAL

## 2024-10-18 ENCOUNTER — MYC MEDICAL ADVICE (OUTPATIENT)
Dept: FAMILY MEDICINE | Facility: CLINIC | Age: 59
End: 2024-10-18
Payer: COMMERCIAL

## 2024-10-18 NOTE — TELEPHONE ENCOUNTER
Patient scheduled for physical with Dr. Ramirez on 10/22. Patient's last physical was 10/31/23 and is not due until 10/31/24. Patient will need to contact insurance company to see if it is ok to have a physical prior 10/31/24 or change visit to office visit if there are other concerns. [employer] message sent.

## 2024-10-21 NOTE — TELEPHONE ENCOUNTER
Patient called back and he has already checked with insurance.  His insurance does calendar year and will cover on 10/22/24 per patient.

## 2024-10-22 ENCOUNTER — OFFICE VISIT (OUTPATIENT)
Dept: FAMILY MEDICINE | Facility: CLINIC | Age: 59
End: 2024-10-22
Payer: COMMERCIAL

## 2024-10-22 VITALS
TEMPERATURE: 97.9 F | SYSTOLIC BLOOD PRESSURE: 134 MMHG | RESPIRATION RATE: 18 BRPM | HEART RATE: 64 BPM | HEIGHT: 74 IN | BODY MASS INDEX: 34.4 KG/M2 | WEIGHT: 268.06 LBS | DIASTOLIC BLOOD PRESSURE: 86 MMHG | OXYGEN SATURATION: 95 %

## 2024-10-22 DIAGNOSIS — Z12.5 SCREENING FOR PROSTATE CANCER: ICD-10-CM

## 2024-10-22 DIAGNOSIS — R79.89 ELEVATED SERUM CREATININE: ICD-10-CM

## 2024-10-22 DIAGNOSIS — R73.01 IMPAIRED FASTING GLUCOSE: ICD-10-CM

## 2024-10-22 DIAGNOSIS — Z13.220 LIPID SCREENING: ICD-10-CM

## 2024-10-22 DIAGNOSIS — Z00.00 ROUTINE GENERAL MEDICAL EXAMINATION AT A HEALTH CARE FACILITY: Primary | ICD-10-CM

## 2024-10-22 LAB
ALBUMIN SERPL BCG-MCNC: 3.9 G/DL (ref 3.5–5.2)
ALP SERPL-CCNC: 88 U/L (ref 40–150)
ALT SERPL W P-5'-P-CCNC: 15 U/L (ref 0–70)
ANION GAP SERPL CALCULATED.3IONS-SCNC: 11 MMOL/L (ref 7–15)
AST SERPL W P-5'-P-CCNC: 19 U/L (ref 0–45)
BILIRUB SERPL-MCNC: 0.6 MG/DL
BUN SERPL-MCNC: 22.7 MG/DL (ref 6–20)
CALCIUM SERPL-MCNC: 9.2 MG/DL (ref 8.8–10.4)
CHLORIDE SERPL-SCNC: 105 MMOL/L (ref 98–107)
CHOLEST SERPL-MCNC: 198 MG/DL
CREAT SERPL-MCNC: 1.21 MG/DL (ref 0.67–1.17)
EGFRCR SERPLBLD CKD-EPI 2021: 69 ML/MIN/1.73M2
EST. AVERAGE GLUCOSE BLD GHB EST-MCNC: 126 MG/DL
FASTING STATUS PATIENT QL REPORTED: YES
FASTING STATUS PATIENT QL REPORTED: YES
GLUCOSE SERPL-MCNC: 102 MG/DL (ref 70–99)
HBA1C MFR BLD: 6 % (ref 0–5.6)
HCO3 SERPL-SCNC: 24 MMOL/L (ref 22–29)
HDLC SERPL-MCNC: 39 MG/DL
LDLC SERPL CALC-MCNC: 134 MG/DL
NONHDLC SERPL-MCNC: 159 MG/DL
POTASSIUM SERPL-SCNC: 4.3 MMOL/L (ref 3.4–5.3)
PROT SERPL-MCNC: 6.7 G/DL (ref 6.4–8.3)
PSA SERPL DL<=0.01 NG/ML-MCNC: 1.54 NG/ML (ref 0–3.5)
SODIUM SERPL-SCNC: 140 MMOL/L (ref 135–145)
TRIGL SERPL-MCNC: 126 MG/DL

## 2024-10-22 PROCEDURE — G0103 PSA SCREENING: HCPCS | Performed by: FAMILY MEDICINE

## 2024-10-22 PROCEDURE — 90471 IMMUNIZATION ADMIN: CPT | Performed by: FAMILY MEDICINE

## 2024-10-22 PROCEDURE — 83036 HEMOGLOBIN GLYCOSYLATED A1C: CPT | Performed by: FAMILY MEDICINE

## 2024-10-22 PROCEDURE — 90715 TDAP VACCINE 7 YRS/> IM: CPT | Performed by: FAMILY MEDICINE

## 2024-10-22 PROCEDURE — 36415 COLL VENOUS BLD VENIPUNCTURE: CPT | Performed by: FAMILY MEDICINE

## 2024-10-22 PROCEDURE — 99396 PREV VISIT EST AGE 40-64: CPT | Mod: 25 | Performed by: FAMILY MEDICINE

## 2024-10-22 PROCEDURE — 80061 LIPID PANEL: CPT | Performed by: FAMILY MEDICINE

## 2024-10-22 PROCEDURE — 80053 COMPREHEN METABOLIC PANEL: CPT | Performed by: FAMILY MEDICINE

## 2024-10-22 SDOH — HEALTH STABILITY: PHYSICAL HEALTH: ON AVERAGE, HOW MANY DAYS PER WEEK DO YOU ENGAGE IN MODERATE TO STRENUOUS EXERCISE (LIKE A BRISK WALK)?: 3 DAYS

## 2024-10-22 ASSESSMENT — PATIENT HEALTH QUESTIONNAIRE - PHQ9
SUM OF ALL RESPONSES TO PHQ QUESTIONS 1-9: 0
SUM OF ALL RESPONSES TO PHQ QUESTIONS 1-9: 0

## 2024-10-22 ASSESSMENT — SOCIAL DETERMINANTS OF HEALTH (SDOH): HOW OFTEN DO YOU GET TOGETHER WITH FRIENDS OR RELATIVES?: PATIENT DECLINED

## 2024-10-22 ASSESSMENT — PAIN SCALES - GENERAL: PAINLEVEL: NO PAIN (0)

## 2024-10-22 NOTE — PATIENT INSTRUCTIONS
Patient Education   Preventive Care Advice   This is general advice given by our system to help you stay healthy. However, your care team may have specific advice just for you. Please talk to your care team about your preventive care needs.  Nutrition  Eat 5 or more servings of fruits and vegetables each day.  Try wheat bread, brown rice and whole grain pasta (instead of white bread, rice, and pasta).  Get enough calcium and vitamin D. Check the label on foods and aim for 100% of the RDA (recommended daily allowance).  Lifestyle  Exercise at least 150 minutes each week  (30 minutes a day, 5 days a week).  Do muscle strengthening activities 2 days a week. These help control your weight and prevent disease.  No smoking.  Wear sunscreen to prevent skin cancer.  Have a dental exam and cleaning every 6 months.  Yearly exams  See your health care team every year to talk about:  Any changes in your health.  Any medicines your care team has prescribed.  Preventive care, family planning, and ways to prevent chronic diseases.  Shots (vaccines)   HPV shots (up to age 26), if you've never had them before.  Hepatitis B shots (up to age 59), if you've never had them before.  COVID-19 shot: Get this shot when it's due.  Flu shot: Get a flu shot every year.  Tetanus shot: Get a tetanus shot every 10 years.  Pneumococcal, hepatitis A, and RSV shots: Ask your care team if you need these based on your risk.  Shingles shot (for age 50 and up)  General health tests  Diabetes screening:  Starting at age 35, Get screened for diabetes at least every 3 years.  If you are younger than age 35, ask your care team if you should be screened for diabetes.  Cholesterol test: At age 39, start having a cholesterol test every 5 years, or more often if advised.  Bone density scan (DEXA): At age 50, ask your care team if you should have this scan for osteoporosis (brittle bones).  Hepatitis C: Get tested at least once in your life.  STIs (sexually  transmitted infections)  Before age 24: Ask your care team if you should be screened for STIs.  After age 24: Get screened for STIs if you're at risk. You are at risk for STIs (including HIV) if:  You are sexually active with more than one person.  You don't use condoms every time.  You or a partner was diagnosed with a sexually transmitted infection.  If you are at risk for HIV, ask about PrEP medicine to prevent HIV.  Get tested for HIV at least once in your life, whether you are at risk for HIV or not.  Cancer screening tests  Cervical cancer screening: If you have a cervix, begin getting regular cervical cancer screening tests starting at age 21.  Breast cancer scan (mammogram): If you've ever had breasts, begin having regular mammograms starting at age 40. This is a scan to check for breast cancer.  Colon cancer screening: It is important to start screening for colon cancer at age 45.  Have a colonoscopy test every 10 years (or more often if you're at risk) Or, ask your provider about stool tests like a FIT test every year or Cologuard test every 3 years.  To learn more about your testing options, visit:   .  For help making a decision, visit:   https://bit.ly/cv07903.  Prostate cancer screening test: If you have a prostate, ask your care team if a prostate cancer screening test (PSA) at age 55 is right for you.  Lung cancer screening: If you are a current or former smoker ages 50 to 80, ask your care team if ongoing lung cancer screenings are right for you.  For informational purposes only. Not to replace the advice of your health care provider. Copyright   2023 [facility]. All rights reserved. Clinically reviewed by the [facility] Transitions Program. PCA Audit 257888 - REV 01/24.  Preventing Falls: Care Instructions  Injuries and health problems such as trouble walking or poor eyesight can increase your risk of falling. So can some medicines. But there are things you can do to help  "prevent falls. You can exercise to get stronger. You can also arrange your home to make it safer.    Talk to your doctor about the medicines you take. Ask if any of them increase the risk of falls and whether they can be changed or stopped.   Try to exercise regularly. It can help improve your strength and balance. This can help lower your risk of falling.         Practice fall safety and prevention.   Wear low-heeled shoes that fit well and give your feet good support. Talk to your doctor if you have foot problems that make this hard.  Carry a cellphone or wear a medical alert device that you can use to call for help.  Use stepladders instead of chairs to reach high objects. Don't climb if you're at risk for falls. Ask for help, if needed.  Wear the correct eyeglasses, if you need them.        Make your home safer.   Remove rugs, cords, clutter, and furniture from walkways.  Keep your house well lit. Use night-lights in hallways and bathrooms.  Install and use sturdy handrails on stairways.  Wear nonskid footwear, even inside. Don't walk barefoot or in socks without shoes.        Be safe outside.   Use handrails, curb cuts, and ramps whenever possible.  Keep your hands free by using a shoulder bag or backpack.  Try to walk in well-lit areas. Watch out for uneven ground, changes in pavement, and debris.  Be careful in the winter. Walk on the grass or gravel when sidewalks are slippery. Use de-icer on steps and walkways. Add non-slip devices to shoes.    Put grab bars and nonskid mats in your shower or tub and near the toilet. Try to use a shower chair or bath bench when bathing.   Get into a tub or shower by putting in your weaker leg first. Get out with your strong side first. Have a phone or medical alert device in the bathroom with you.   Where can you learn more?  Go to https://www.[employer].net/patiented  Enter G117 in the search box to learn more about \"Preventing Falls: Care Instructions.\"  Current as of: " July 17, 2023  Content Version: 14.2 2024 [facility] [employer].   Care instructions adapted under license by your healthcare professional. If you have questions about a medical condition or this instruction, always ask your healthcare professional. [employer] disclaims any warranty or liability for your use of this information.

## 2024-10-22 NOTE — PROGRESS NOTES
"Preventive Care Visit  [facility] DELMER Ramirez MD, Family Medicine  Oct 22, 2024      Assessment & Plan     Routine general medical examination at a health care facility  See counseling messages   Tdap done today.   Declines covid and flu vaccines    Impaired fasting glucose  Labs ordered.   Continue with exercise.   Will notify of results.   - Hemoglobin A1c; Future  - Comprehensive metabolic panel (BMP + Alb, Alk Phos, ALT, AST, Total. Bili, TP); Future  - Hemoglobin A1c  - Comprehensive metabolic panel (BMP + Alb, Alk Phos, ALT, AST, Total. Bili, TP)    Lipid screening  Will notify of results.   - Lipid panel reflex to direct LDL Fasting; Future  - Lipid panel reflex to direct LDL Fasting    Screening for prostate cancer  Will notify of results.   - PSA, screen; Future  - PSA, screen            BMI  Estimated body mass index is 34.89 kg/m  as calculated from the following:    Height as of this encounter: 1.867 m (6' 1.5\").    Weight as of this encounter: 121.6 kg (268 lb 1 oz).       Counseling  Appropriate preventive services were addressed with this patient via screening, questionnaire, or discussion as appropriate for fall prevention, nutrition, physical activity, Tobacco-use cessation, social engagement, weight loss and cognition.  Checklist reviewing preventive services available has been given to the patient.  Reviewed patient's diet, addressing concerns and/or questions.   He is at risk for lack of exercise and has been provided with information to increase physical activity for the benefit of his well-being.   The patient was instructed to see the dentist every 6 months.   He is at risk for psychosocial distress and has been provided with information to reduce risk.           Terry Rojas is a 59 year old, presenting for the following:  Physical        10/22/2024     6:56 AM   Additional Questions   Roomed by [facility] Care Directive  Patient does not have a Health " Care Directive or Living Will: Patient states has Advance Directive and will bring in a copy to clinic.    HPI    Answers submitted by the patient for this visit:  Patient Health Questionnaire (Submitted on 10/22/2024)  PHQ9 TOTAL SCORE: 0    IMPAIRED FASTING GLUCOSE - due for recheck today. Has been exercising.         10/22/2024   General Health   How would you rate your overall physical health? (!) FAIR   Feel stress (tense, anxious, or unable to sleep) Only a little      (!) STRESS CONCERN      10/22/2024   Nutrition   Three or more servings of calcium each day? Yes   Diet: Regular (no restrictions)   How many servings of fruit and vegetables per day? (!) 0-1   How many sweetened beverages each day? (!) 2            10/22/2024   Exercise   Days per week of moderate/strenous exercise 3 days            10/22/2024   Social Factors   Frequency of gathering with friends or relatives Patient declined   Worry food won't last until get money to buy more No   Food not last or not have enough money for food? No   Do you have housing? (Housing is defined as stable permanent housing and does not include staying ouside in a car, in a tent, in an abandoned building, in an overnight shelter, or couch-surfing.) Yes   Are you worried about losing your housing? No   Lack of transportation? No   Unable to get utilities (heat,electricity)? No            10/22/2024   Fall Risk   Fallen 2 or more times in the past year? Yes   Trouble with walking or balance? No   Gait Speed Test (Document in seconds) 3   Gait Speed Test Interpretation Less than or equal to 5.00 seconds - PASS             10/22/2024   Dental   Dentist two times every year? (!) NO            10/22/2024   TB Screening   Were you born outside of the [place]? No          Today's PHQ-9 Score:       10/22/2024     6:50 AM   PHQ-9 SCORE   PHQ-9 Total Score MyChart 0   PHQ-9 Total Score 0         10/22/2024   Substance Use   Alcohol more than 3/day or more than 7/wk No   Do you  use any other substances recreationally? No        Social History     Tobacco Use    Smoking status: Never     Passive exposure: Never    Smokeless tobacco: Never   Vaping Use    Vaping status: Never Used   Substance Use Topics    Alcohol use: Yes     Comment: 1-2 drinks per week     Drug use: No           10/22/2024   STI Screening   New sexual partner(s) since last STI/HIV test? No      Last PSA:   PSA   Date Value Ref Range Status   09/16/2020 1.69 0 - 4 ug/L Final     Comment:     Assay Method:  Chemiluminescence using Siemens Vista analyzer     Prostate Specific Antigen Screen   Date Value Ref Range Status   01/30/2023 1.78 0.00 - 4.00 ug/L Final     ASCVD Risk   The 10-year ASCVD risk score (Aden WILDE, et al., 2019) is: 8.7%    Values used to calculate the score:      Age: 59 years      Sex: Male      Is Non- : No      Diabetic: No      Tobacco smoker: No      Systolic Blood Pressure: 134 mmHg      Is BP treated: No      HDL Cholesterol: 42 mg/dL      Total Cholesterol: 192 mg/dL           Reviewed and updated as needed this visit by Provider                    BP Readings from Last 3 Encounters:   10/22/24 134/86   01/11/24 128/80   10/31/23 128/82    Wt Readings from Last 3 Encounters:   10/22/24 121.6 kg (268 lb 1 oz)   01/11/24 125 kg (275 lb 8 oz)   10/31/23 122.3 kg (269 lb 9.6 oz)                      Review of Systems  CONSTITUTIONAL: NEGATIVE for fever, chills, change in weight  INTEGUMENTARY/SKIN: NEGATIVE for worrisome rashes, moles or lesions  EYES: NEGATIVE for vision changes or irritation  ENT/MOUTH: NEGATIVE for ear, mouth and throat problems  CV: NEGATIVE for chest pain, palpitations or peripheral edema  GI: NEGATIVE for nausea, abdominal pain, heartburn, or change in bowel habits  : NEGATIVE for frequency, dysuria, or hematuria  MUSCULOSKELETAL: NEGATIVE for significant arthralgias or myalgia  NEURO: NEGATIVE for weakness, dizziness or paresthesias    "  Objective    Exam  /86 (BP Location: Left arm, Patient Position: Chair, Cuff Size: Adult Large)   Pulse 64   Temp 97.9  F (36.6  C) (Temporal)   Resp 18   Ht 1.867 m (6' 1.5\")   Wt 121.6 kg (268 lb 1 oz)   SpO2 95%   BMI 34.89 kg/m     Estimated body mass index is 34.89 kg/m  as calculated from the following:    Height as of this encounter: 1.867 m (6' 1.5\").    Weight as of this encounter: 121.6 kg (268 lb 1 oz).    Physical Exam  GENERAL: alert and no distress  EYES: Eyes grossly normal to inspection, PERRL and conjunctivae and sclerae normal  HENT: ear canals and TM's normal, nose and mouth without ulcers or lesions  NECK: no adenopathy, no asymmetry, masses, or scars  RESP: lungs clear to auscultation - no rales, rhonchi or wheezes  CV: regular rate and rhythm, normal S1 S2, no S3 or S4, no murmur, click or rub, no peripheral edema  ABDOMEN: soft, nontender, no hepatosplenomegaly, no masses and bowel sounds normal. Rectus diastasis noted.   MS: no gross musculoskeletal defects noted, no edema  SKIN: no suspicious lesions or rashes  NEURO: Normal strength and tone, mentation intact and speech normal  PSYCH: mentation appears normal, affect normal/bright        Signed Electronically by: Batsheva Ramirez MD    "

## 2024-10-22 NOTE — NURSING NOTE
Prior to immunization administration, verified patients identity using patient s name and date of birth. Please see Immunization Activity for additional information.     Screening Questionnaire for Adult Immunization    Are you sick today?   No   Do you have allergies to medications, food, a vaccine component or latex?   Yes   Have you ever had a serious reaction after receiving a vaccination?   No   Do you have a long-term health problem with heart, lung, kidney, or metabolic disease (e.g., diabetes), asthma, a blood disorder, no spleen, complement component deficiency, a cochlear implant, or a spinal fluid leak?  Are you on long-term aspirin therapy?   No   Do you have cancer, leukemia, HIV/AIDS, or any other immune system problem?   No   Do you have a parent, brother, or sister with an immune system problem?   No   In the past 3 months, have you taken medications that affect  your immune system, such as prednisone, other steroids, or anticancer drugs; drugs for the treatment of rheumatoid arthritis, Crohn s disease, or psoriasis; or have you had radiation treatments?   No   Have you had a seizure, or a brain or other nervous system problem?   No   During the past year, have you received a transfusion of blood or blood    products, or been given immune (gamma) globulin or antiviral drug?   No   For women: Are you pregnant or is there a chance you could become       pregnant during the next month?   No   Have you received any vaccinations in the past 4 weeks?   No     Immunization questionnaire was positive for at least one answer.  Notified known drug allergies.      Patient instructed to remain in clinic for 15 minutes afterwards, and to report any adverse reactions.     Screening performed by Maty Crocker MA on 10/22/2024 at 7:31 AM.

## 2024-10-25 ENCOUNTER — OFFICE VISIT (OUTPATIENT)
Dept: FAMILY MEDICINE | Facility: OTHER | Age: 59
End: 2024-10-25
Payer: COMMERCIAL

## 2024-10-25 ENCOUNTER — NURSE TRIAGE (OUTPATIENT)
Dept: NURSING | Facility: CLINIC | Age: 59
End: 2024-10-25
Payer: COMMERCIAL

## 2024-10-25 VITALS
WEIGHT: 270 LBS | HEIGHT: 73 IN | SYSTOLIC BLOOD PRESSURE: 138 MMHG | HEART RATE: 62 BPM | BODY MASS INDEX: 35.78 KG/M2 | OXYGEN SATURATION: 95 % | RESPIRATION RATE: 20 BRPM | DIASTOLIC BLOOD PRESSURE: 86 MMHG | TEMPERATURE: 97.3 F

## 2024-10-25 DIAGNOSIS — K64.4 EXTERNAL HEMORRHOIDS: Primary | ICD-10-CM

## 2024-10-25 PROCEDURE — 99213 OFFICE O/P EST LOW 20 MIN: CPT | Performed by: FAMILY MEDICINE

## 2024-10-25 PROCEDURE — G2211 COMPLEX E/M VISIT ADD ON: HCPCS | Performed by: FAMILY MEDICINE

## 2024-10-25 RX ORDER — HYDROCORTISONE 25 MG/G
CREAM TOPICAL 2 TIMES DAILY PRN
Qty: 30 G | Refills: 1 | Status: SHIPPED | OUTPATIENT
Start: 2024-10-25

## 2024-10-25 ASSESSMENT — PAIN SCALES - GENERAL: PAINLEVEL_OUTOF10: MODERATE PAIN (5)

## 2024-10-25 NOTE — PROGRESS NOTES
"  Assessment & Plan     External hemorrhoids  Discussed the nature of these, recommended treatments including use of fluid/fiber.  Discussed possible thrombosis.  For now, we will try treatment with topical hydrocortisone cream and sitz bath's as well as working on keeping his stool soft and regular.  If not improving, we could certainly refer to surgery.  - hydrocortisone, Perianal, (HYDROCORTISONE) 2.5 % cream; Place rectally 2 times daily as needed for hemorrhoids. Urgent- Pt is on his way to pick this up          BMI  Estimated body mass index is 35.55 kg/m  as calculated from the following:    Height as of this encounter: 1.856 m (6' 1.07\").    Weight as of this encounter: 122.5 kg (270 lb).   Weight management plan: Discussed healthy diet and exercise guidelines      Patient Instructions   Use the proctosol cream to help with irritation.  This should help over the next few days.    OK to use the otc cream as well.      Can try sitz baths too.      Use fiber supplements and water to keep your stools soft and regular.    If pain/bleeding significantly increase, then we should see you back.  Occasionally hemorrhoids will clot off and get very painful.      Consider updating your immunizations.      Update colon cancer screening by February.    Contact us or return if questions or concerns.    Have a nice day!    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         Hemorrhoids  Onset/Duration: 3-7 days ago   Description:   Kristy-anal " "lump: YES  Pain: YES  Itching: No  Accompanying Signs & Symptoms:  Blood in stool: YES  Changes in stool pattern: No  History:   Any previous GI studies done:Cologuard test, up to date until 2025.  Family History of colon cancer: No  Precipitating factors:   None  Alleviating factors:  None  Therapies tried and outcome: preparation H    Pt has had painful hemorrhoids for the past week.  Has tried otc generic preparation H.  Just started this last night.       Pain was only while stooling over the past week.  Does feel a hemorrhoid prolapse at times, but can usually replace.    Started to have bleeding last night.  Much more this morning.  Noting mostly blood when wiping, some blood in the toilet.  No clots seen in the toilet.      Has had hemorrhoids in the past, but never this severe.  He usually uses lotion to lubricate his buttock cheeks.            Objective    /86   Pulse 62   Temp 97.3  F (36.3  C) (Temporal)   Resp 20   Ht 1.856 m (6' 1.07\")   Wt 122.5 kg (270 lb)   SpO2 95%   BMI 35.55 kg/m    Body mass index is 35.55 kg/m .  Physical Exam   GENERAL: alert and no distress  RECTAL (male): external large hemorrhoid on left side of rectum, soft, not thrombosed.  Appears irritated.      Office Visit on 10/22/2024   Component Date Value Ref Range Status    Estimated Average Glucose 10/22/2024 126 (H)  <117 mg/dL Final    Hemoglobin A1C 10/22/2024 6.0 (H)  0.0 - 5.6 % Final    Normal <5.7%   Prediabetes 5.7-6.4%    Diabetes 6.5% or higher     Note: Adopted from ADA consensus guidelines.    Sodium 10/22/2024 140  135 - 145 mmol/L Final    Potassium 10/22/2024 4.3  3.4 - 5.3 mmol/L Final    Carbon Dioxide (CO2) 10/22/2024 24  22 - 29 mmol/L Final    Anion Gap 10/22/2024 11  7 - 15 mmol/L Final    Urea Nitrogen 10/22/2024 22.7 (H)  6.0 - 20.0 mg/dL Final    Creatinine 10/22/2024 1.21 (H)  0.67 - 1.17 mg/dL Final    GFR Estimate 10/22/2024 69  >60 mL/min/1.73m2 Final    eGFR calculated using 2021 CKD-EPI " equation.    Calcium 10/22/2024 9.2  8.8 - 10.4 mg/dL Final    Reference intervals for this test were updated on 7/16/2024 to reflect our healthy population more accurately. There may be differences in the flagging of prior results with similar values performed with this method. Those prior results can be interpreted in the context of the updated reference intervals.    Chloride 10/22/2024 105  98 - 107 mmol/L Final    Glucose 10/22/2024 102 (H)  70 - 99 mg/dL Final    Alkaline Phosphatase 10/22/2024 88  40 - 150 U/L Final    AST 10/22/2024 19  0 - 45 U/L Final    ALT 10/22/2024 15  0 - 70 U/L Final    Protein Total 10/22/2024 6.7  6.4 - 8.3 g/dL Final    Albumin 10/22/2024 3.9  3.5 - 5.2 g/dL Final    Bilirubin Total 10/22/2024 0.6  <=1.2 mg/dL Final    Patient Fasting > 8hrs? 10/22/2024 Yes   Final    Cholesterol 10/22/2024 198  <200 mg/dL Final    Triglycerides 10/22/2024 126  <150 mg/dL Final    Direct Measure HDL 10/22/2024 39 (L)  >=40 mg/dL Final    LDL Cholesterol Calculated 10/22/2024 134 (H)  <100 mg/dL Final    Non HDL Cholesterol 10/22/2024 159 (H)  <130 mg/dL Final    Patient Fasting > 8hrs? 10/22/2024 Yes   Final    Prostate Specific Antigen Screen 10/22/2024 1.54  0.00 - 3.50 ng/mL Final           Signed Electronically by: Nick Carlton MD, MD

## 2024-10-25 NOTE — PATIENT INSTRUCTIONS
Use the proctosol cream to help with irritation.  This should help over the next few days.    OK to use the otc cream as well.      Can try sitz baths too.      Use fiber supplements and water to keep your stools soft and regular.    If pain/bleeding significantly increase, then we should see you back.  Occasionally hemorrhoids will clot off and get very painful.      Consider updating your immunizations.      Update colon cancer screening by February.    Contact us or return if questions or concerns.    Have a nice day!    Dr. Carlton

## 2024-10-25 NOTE — TELEPHONE ENCOUNTER
"Nurse Triage SBAR    Is this a 2nd Level Triage? NO    Situation: Bleeding from rectum that patient suspects is his hemorrhoids     Background: history of hemorrhoids     Assessment:   Indcates blood on the surface of his stool and toilet paper  Inidcates bright red blood   Using Cream and wipes  Some constipation in the AM- notes soft formed stool during the day  Not on medication that would thin his blood  Denies additional symptoms    Protocol Recommended Disposition:   See PCP Within 3 Days    Recommendation: Advised to have and office visit, reviewed additional care advise with patient and he verbalizes understanding. Declines additional questions. Assisted in connecting with scheduling.     Scheduling for an appointment    Does the patient meet one of the following criteria for ADS visit consideration? 16+ years old, with an [facility] PCP     Mya Stock RN BSN 10/25/2024 5:48 AM    Reason for Disposition   MILD rectal bleeding (more than just a few drops or streaks)    Additional Information   Negative: Shock suspected (e.g., cold/pale/clammy skin, too weak to stand, low BP, rapid pulse)   Negative: Difficult to awaken or acting confused (e.g., disoriented, slurred speech)   Negative: Passed out (i.e., lost consciousness, collapsed and was not responding)   Negative: [1] Vomiting AND [2] contains red blood or black (\"coffee ground\") material  (Exception: Few red streaks in vomit that only happened once.)   Negative: Sounds like a life-threatening emergency to the triager   Negative: Diarrhea is main symptom   Negative: Stool color other than brown or tan is main concern  (no bleeding and no melena)   Negative: SEVERE rectal bleeding (large blood clots; constant or on and off bleeding)   Negative: SEVERE dizziness (e.g., unable to stand, requires support to walk, feels like passing out now)   Negative: [1] MODERATE rectal bleeding (small blood clots, passing blood without stool, or toilet water turns red) AND " [2] more than once a day   Negative: Pale skin (pallor) of new-onset or worsening   Negative: Black or tarry bowel movements  (Exception: Chronic-unchanged black-grey BMs AND is taking iron pills or Pepto-Bismol.)   Negative: [1] Constant abdominal pain AND [2] present > 2 hours   Negative: Rectal foreign body (i.e., now or within past week;  inserted or swallowed)   Negative: High-risk adult (e.g., prior surgery on aorta, abdominal aortic aneurysm)   Negative: Taking Coumadin (warfarin) or other strong blood thinner, or known bleeding disorder (e.g., thrombocytopenia)   Negative: Known cirrhosis of the liver (or history of liver failure or ascites)   Negative: [1] Colonoscopy AND [2] in past 72 hours   Negative: Patient sounds very sick or weak to the triager   Negative: MODERATE rectal bleeding (small blood clots, passing blood without stool, or toilet water turns red)    Protocols used: Rectal Bleeding-A-AH

## 2024-10-25 NOTE — LETTER
10/25/2024    Trung E Silvino   1965        To Whom it May Concern;    Please excuse Trung BREWER Silvino from work/school for a healthcare visit on Oct 25, 2024.  Due to current illness, he will need to not work the rest of the day.    Sincerely,        Nick Carlton MD

## 2025-08-04 ENCOUNTER — ANCILLARY PROCEDURE (OUTPATIENT)
Dept: GENERAL RADIOLOGY | Facility: CLINIC | Age: 60
End: 2025-08-04
Attending: NURSE PRACTITIONER

## 2025-08-04 ENCOUNTER — OFFICE VISIT (OUTPATIENT)
Dept: FAMILY MEDICINE | Facility: CLINIC | Age: 60
End: 2025-08-04

## 2025-08-04 VITALS
SYSTOLIC BLOOD PRESSURE: 137 MMHG | OXYGEN SATURATION: 98 % | RESPIRATION RATE: 14 BRPM | TEMPERATURE: 97.5 F | HEART RATE: 76 BPM | WEIGHT: 270.4 LBS | DIASTOLIC BLOOD PRESSURE: 80 MMHG | BODY MASS INDEX: 35.84 KG/M2 | HEIGHT: 73 IN

## 2025-08-04 DIAGNOSIS — G89.29 CHRONIC PAIN OF RIGHT KNEE: Primary | ICD-10-CM

## 2025-08-04 DIAGNOSIS — M25.561 CHRONIC PAIN OF RIGHT KNEE: Primary | ICD-10-CM

## 2025-08-04 DIAGNOSIS — G89.29 CHRONIC PAIN OF RIGHT KNEE: ICD-10-CM

## 2025-08-04 DIAGNOSIS — M25.561 CHRONIC PAIN OF RIGHT KNEE: ICD-10-CM

## 2025-08-04 PROCEDURE — 99213 OFFICE O/P EST LOW 20 MIN: CPT | Performed by: NURSE PRACTITIONER

## 2025-08-04 PROCEDURE — 73562 X-RAY EXAM OF KNEE 3: CPT | Mod: TC | Performed by: RADIOLOGY

## 2025-08-04 ASSESSMENT — PATIENT HEALTH QUESTIONNAIRE - PHQ9
SUM OF ALL RESPONSES TO PHQ QUESTIONS 1-9: 6
SUM OF ALL RESPONSES TO PHQ QUESTIONS 1-9: 6
10. IF YOU CHECKED OFF ANY PROBLEMS, HOW DIFFICULT HAVE THESE PROBLEMS MADE IT FOR YOU TO DO YOUR WORK, TAKE CARE OF THINGS AT HOME, OR GET ALONG WITH OTHER PEOPLE: NOT DIFFICULT AT ALL

## 2025-08-04 ASSESSMENT — PAIN SCALES - GENERAL: PAINLEVEL_OUTOF10: NO PAIN (0)

## 2025-08-05 ENCOUNTER — TELEPHONE (OUTPATIENT)
Dept: FAMILY MEDICINE | Facility: CLINIC | Age: 60
End: 2025-08-05

## 2025-08-05 ENCOUNTER — PATIENT OUTREACH (OUTPATIENT)
Dept: CARE COORDINATION | Facility: CLINIC | Age: 60
End: 2025-08-05

## 2025-08-07 ENCOUNTER — PATIENT OUTREACH (OUTPATIENT)
Dept: CARE COORDINATION | Facility: CLINIC | Age: 60
End: 2025-08-07

## (undated) DEVICE — PACK ARTHROSCOPY KNEE SOP15AKFSM

## (undated) DEVICE — BNDG FLEXIGRIP G 11YD ROLL UNSTERILE LATEX FREE 0814-9096

## (undated) DEVICE — NDL 22GA 1.5"

## (undated) DEVICE — GLOVE BIOGEL PI MICRO INDICATOR UNDERGLOVE SZ 8.0 48980

## (undated) DEVICE — SU ETHILON 3-0 FS-1 18" 669H

## (undated) DEVICE — BUR ARTHREX COOLCUT DISSECTOR 3.5MM  AR-8350DS

## (undated) DEVICE — NDL 19GA 1.5"

## (undated) DEVICE — GLOVE BIOGEL PI MICRO SZ 8.0 48580

## (undated) DEVICE — GLOVE PROTEXIS BLUE W/NEU-THERA 8.0  2D73EB80

## (undated) DEVICE — PREP CHLORAPREP 26ML TINTED ORANGE  260815

## (undated) DEVICE — SOL WATER IRRIG 1000ML BOTTLE 07139-09

## (undated) DEVICE — TUBING ARTHROSCOPY PUMP ARTHREX AR-6410

## (undated) DEVICE — SOL NACL 0.9% IRRIG 3000ML BAG 07972-08

## (undated) RX ORDER — ONDANSETRON 2 MG/ML
INJECTION INTRAMUSCULAR; INTRAVENOUS
Status: DISPENSED
Start: 2023-03-23

## (undated) RX ORDER — DEXAMETHASONE SODIUM PHOSPHATE 4 MG/ML
INJECTION, SOLUTION INTRA-ARTICULAR; INTRALESIONAL; INTRAMUSCULAR; INTRAVENOUS; SOFT TISSUE
Status: DISPENSED
Start: 2023-03-23

## (undated) RX ORDER — ACETAMINOPHEN 325 MG/1
TABLET ORAL
Status: DISPENSED
Start: 2023-03-23

## (undated) RX ORDER — CEFAZOLIN SODIUM 1 G/3ML
INJECTION, POWDER, FOR SOLUTION INTRAMUSCULAR; INTRAVENOUS
Status: DISPENSED
Start: 2023-03-23

## (undated) RX ORDER — OXYCODONE HYDROCHLORIDE 5 MG/1
TABLET ORAL
Status: DISPENSED
Start: 2023-03-23